# Patient Record
Sex: FEMALE | Race: WHITE | NOT HISPANIC OR LATINO | Employment: UNEMPLOYED | URBAN - METROPOLITAN AREA
[De-identification: names, ages, dates, MRNs, and addresses within clinical notes are randomized per-mention and may not be internally consistent; named-entity substitution may affect disease eponyms.]

---

## 2019-04-30 ENCOUNTER — TELEPHONE (OUTPATIENT)
Dept: OBSTETRICS AND GYNECOLOGY | Facility: CLINIC | Age: 39
End: 2019-04-30

## 2019-04-30 NOTE — TELEPHONE ENCOUNTER
----- Message from Kirti Del Valle sent at 4/30/2019 11:59 AM CDT -----  Contact: pt   Can the clinic reply in [facility]:  yes    Who Called: SOSA CASTRO [[id number]]    Date of Positive Preg Test:  04-    1st day of Last Menstrual Cycle: 03-    List Any Difficulties:  no    What Number to Call Back: [phone]

## 2019-04-30 NOTE — TELEPHONE ENCOUNTER
----- Message from Bryant Christy sent at 4/30/2019  2:32 PM CDT -----  Contact: Self  PT is calling back to schedule her initial ob, confirmation, and ultrasound. Best phone number to call her is [phone].

## 2019-05-01 ENCOUNTER — TELEPHONE (OUTPATIENT)
Dept: OBSTETRICS AND GYNECOLOGY | Facility: CLINIC | Age: 39
End: 2019-05-01

## 2019-05-01 ENCOUNTER — OFFICE VISIT (OUTPATIENT)
Dept: OBSTETRICS AND GYNECOLOGY | Facility: CLINIC | Age: 39
End: 2019-05-01
Payer: MEDICAID

## 2019-05-01 ENCOUNTER — HOSPITAL ENCOUNTER (OUTPATIENT)
Dept: RADIOLOGY | Facility: OTHER | Age: 39
Discharge: HOME | End: 2019-05-01
Attending: STUDENT IN AN ORGANIZED HEALTH CARE EDUCATION/TRAINING PROGRAM
Payer: MEDICAID

## 2019-05-01 VITALS
DIASTOLIC BLOOD PRESSURE: 64 MMHG | BODY MASS INDEX: 22.44 KG/M2 | WEIGHT: 134.69 LBS | HEIGHT: 65 IN | SYSTOLIC BLOOD PRESSURE: 106 MMHG

## 2019-05-01 DIAGNOSIS — R10.9 ABDOMINAL PAIN DURING PREGNANCY IN FIRST TRIMESTER: ICD-10-CM

## 2019-05-01 DIAGNOSIS — E83.110 HEREDITARY HEMOCHROMATOSIS: ICD-10-CM

## 2019-05-01 DIAGNOSIS — O09.511 ELDERLY PRIMIGRAVIDA IN FIRST TRIMESTER: ICD-10-CM

## 2019-05-01 DIAGNOSIS — O26.891 ABDOMINAL PAIN DURING PREGNANCY IN FIRST TRIMESTER: ICD-10-CM

## 2019-05-01 DIAGNOSIS — Z32.01 POSITIVE PREGNANCY TEST: Primary | ICD-10-CM

## 2019-05-01 DIAGNOSIS — Z32.01 POSITIVE PREGNANCY TEST: ICD-10-CM

## 2019-05-01 DIAGNOSIS — N91.2 AMENORRHEA: Primary | ICD-10-CM

## 2019-05-01 PROBLEM — O09.519 ADVANCED MATERNAL AGE, PRIMIGRAVIDA, ANTEPARTUM: Status: ACTIVE | Noted: 2019-05-01

## 2019-05-01 LAB
ABO + RH BLD: NORMAL
ALBUMIN SERPL BCP-MCNC: 3.8 G/DL (ref 3.5–5.2)
ALP SERPL-CCNC: 65 U/L (ref 55–135)
ALT SERPL W/O P-5'-P-CCNC: 21 U/L (ref 10–44)
ANION GAP SERPL CALC-SCNC: 9 MMOL/L (ref 8–16)
AST SERPL-CCNC: 23 U/L (ref 10–40)
B-HCG UR QL: POSITIVE
BILIRUB SERPL-MCNC: 0.6 MG/DL (ref 0.1–1)
BLD GP AB SCN CELLS X3 SERPL QL: NORMAL
BUN SERPL-MCNC: 6 MG/DL (ref 6–20)
CALCIUM SERPL-MCNC: 10.3 MG/DL (ref 8.7–10.5)
CHLORIDE SERPL-SCNC: 100 MMOL/L (ref 95–110)
CO2 SERPL-SCNC: 28 MMOL/L (ref 23–29)
CREAT SERPL-MCNC: 0.6 MG/DL (ref 0.5–1.4)
CTP QC/QA: YES
ERYTHROCYTE [DISTWIDTH] IN BLOOD BY AUTOMATED COUNT: 12.7 % (ref 11.5–14.5)
EST. GFR  (AFRICAN AMERICAN): >60 ML/MIN/1.73 M^2
EST. GFR  (NON AFRICAN AMERICAN): >60 ML/MIN/1.73 M^2
FERRITIN SERPL-MCNC: 41 NG/ML (ref 20–300)
GLUCOSE SERPL-MCNC: 68 MG/DL (ref 70–110)
HCT VFR BLD AUTO: 43.2 % (ref 37–48.5)
HGB BLD-MCNC: 14.6 G/DL (ref 12–16)
IRON SERPL-MCNC: 69 UG/DL (ref 30–160)
MCH RBC QN AUTO: 33.6 PG (ref 27–31)
MCHC RBC AUTO-ENTMCNC: 33.8 G/DL (ref 32–36)
MCV RBC AUTO: 100 FL (ref 82–98)
PLATELET # BLD AUTO: 324 K/UL (ref 150–350)
PMV BLD AUTO: 10.2 FL (ref 9.2–12.9)
POTASSIUM SERPL-SCNC: 3.9 MMOL/L (ref 3.5–5.1)
PROT SERPL-MCNC: 7.4 G/DL (ref 6–8.4)
RBC # BLD AUTO: 4.34 M/UL (ref 4–5.4)
SATURATED IRON: 21 % (ref 20–50)
SODIUM SERPL-SCNC: 137 MMOL/L (ref 136–145)
TOTAL IRON BINDING CAPACITY: 332 UG/DL (ref 250–450)
TRANSFERRIN SERPL-MCNC: 224 MG/DL (ref 200–375)
WBC # BLD AUTO: 7.8 K/UL (ref 3.9–12.7)

## 2019-05-01 PROCEDURE — 87086 URINE CULTURE/COLONY COUNT: CPT

## 2019-05-01 PROCEDURE — 76817 US OB LESS THAN 14 WKS WITH TRANSVAGINAL (XPD): ICD-10-PCS | Mod: 26,,, | Performed by: RADIOLOGY

## 2019-05-01 PROCEDURE — 76817 TRANSVAGINAL US OBSTETRIC: CPT | Mod: 26,,, | Performed by: RADIOLOGY

## 2019-05-01 PROCEDURE — 99203 OFFICE O/P NEW LOW 30 MIN: CPT | Mod: S$PBB,TH,, | Performed by: STUDENT IN AN ORGANIZED HEALTH CARE EDUCATION/TRAINING PROGRAM

## 2019-05-01 PROCEDURE — 86850 RBC ANTIBODY SCREEN: CPT

## 2019-05-01 PROCEDURE — 86592 SYPHILIS TEST NON-TREP QUAL: CPT

## 2019-05-01 PROCEDURE — 36415 COLL VENOUS BLD VENIPUNCTURE: CPT | Mod: PO

## 2019-05-01 PROCEDURE — 76801 OB US < 14 WKS SINGLE FETUS: CPT | Mod: TC

## 2019-05-01 PROCEDURE — 82728 ASSAY OF FERRITIN: CPT

## 2019-05-01 PROCEDURE — 99203 PR OFFICE/OUTPT VISIT, NEW, LEVL III, 30-44 MIN: ICD-10-PCS | Mod: S$PBB,TH,, | Performed by: STUDENT IN AN ORGANIZED HEALTH CARE EDUCATION/TRAINING PROGRAM

## 2019-05-01 PROCEDURE — 81025 URINE PREGNANCY TEST: CPT | Mod: PBBFAC,PO | Performed by: STUDENT IN AN ORGANIZED HEALTH CARE EDUCATION/TRAINING PROGRAM

## 2019-05-01 PROCEDURE — 76801 US OB LESS THAN 14 WKS WITH TRANSVAGINAL (XPD): ICD-10-PCS | Mod: 26,,, | Performed by: RADIOLOGY

## 2019-05-01 PROCEDURE — 86787 VARICELLA-ZOSTER ANTIBODY: CPT

## 2019-05-01 PROCEDURE — 87340 HEPATITIS B SURFACE AG IA: CPT

## 2019-05-01 PROCEDURE — 86703 HIV-1/HIV-2 1 RESULT ANTBDY: CPT

## 2019-05-01 PROCEDURE — 76801 OB US < 14 WKS SINGLE FETUS: CPT | Mod: 26,,, | Performed by: RADIOLOGY

## 2019-05-01 PROCEDURE — 85027 COMPLETE CBC AUTOMATED: CPT

## 2019-05-01 PROCEDURE — 99999 PR PBB SHADOW E&M-EST. PATIENT-LVL III: CPT | Mod: PBBFAC,,, | Performed by: STUDENT IN AN ORGANIZED HEALTH CARE EDUCATION/TRAINING PROGRAM

## 2019-05-01 PROCEDURE — 86762 RUBELLA ANTIBODY: CPT

## 2019-05-01 PROCEDURE — 99999 PR PBB SHADOW E&M-EST. PATIENT-LVL III: ICD-10-PCS | Mod: PBBFAC,,, | Performed by: STUDENT IN AN ORGANIZED HEALTH CARE EDUCATION/TRAINING PROGRAM

## 2019-05-01 PROCEDURE — 99213 OFFICE O/P EST LOW 20 MIN: CPT | Mod: PBBFAC,TH,PO,25 | Performed by: STUDENT IN AN ORGANIZED HEALTH CARE EDUCATION/TRAINING PROGRAM

## 2019-05-01 PROCEDURE — 88175 CYTOPATH C/V AUTO FLUID REDO: CPT

## 2019-05-01 PROCEDURE — 83540 ASSAY OF IRON: CPT

## 2019-05-01 PROCEDURE — 87624 HPV HI-RISK TYP POOLED RSLT: CPT

## 2019-05-01 PROCEDURE — 87491 CHLMYD TRACH DNA AMP PROBE: CPT

## 2019-05-01 PROCEDURE — 80053 COMPREHEN METABOLIC PANEL: CPT

## 2019-05-01 NOTE — TELEPHONE ENCOUNTER
----- Message from Isabel Espino sent at 5/1/2019  8:11 AM CDT -----  Contact: Self            Name of Who is Calling: SOSA CASTRO [[id number]]      What is the request in detail: Pt is scheduled for a pregnancy confirmation tomorrow and states she is having pelvic discomfort. Pt wants to know if she can do her pregnancy confirmation today. Pt stated she  is 8 weeks. Please contact to further discuss and advise.        Can the clinic reply by [facility]: N      What Number to [facility] Back [facility]: [phone]

## 2019-05-01 NOTE — PROGRESS NOTES
Subjective:       Patient ID: Danielle Garcia is a 38 y.o. female.    Chief Complaint:  Amenorrhea      History of Present Illness  Danielle Garcia is a 38 y.o. F at Unknown presents complaining of missed period. Patient LMP was 3/6/19. She reports taking home pregnancy test two weeks ago which returned as positive. Patient recently moved to the area from [place] where she received prior care. She is overall feeling well. Reports she has been experiencing some mild nausea and pelvic discomfort but is otherwise without complaint. She denies history of prior pregnancy. Of note, patient has past medical history of hereditary hemochromatosis as well as heterozygosity for alpha-1 antitrypsin deficiency. She denies requiring active management for the disorder. Reports she was seen by provider in [place] who recommended frequent blood donation for management of iron levels. Surgical history includes right knee surgery and heart procedure for VSD as a child.         GYN & OB History  Patient's last menstrual period was 2019.   Date of Last Pap: No result found    OB History    Para Term  AB Living   2             SAB TAB Ectopic Multiple Live Births                  # Outcome Date GA Lbr Justino/2nd Weight Sex Delivery Anes PTL Lv   2 Current            1                 Review of Systems  Review of Systems   Constitutional: Negative for chills and fever.   Eyes: Negative for visual disturbance.   Respiratory: Negative for shortness of breath.    Cardiovascular: Negative for chest pain and palpitations.   Gastrointestinal: Negative for abdominal pain, constipation, diarrhea, nausea and vomiting.   Genitourinary: Positive for pelvic pain. Negative for vaginal bleeding, vaginal discharge and vaginal pain.   Musculoskeletal: Negative for back pain.   Integumentary:  Negative for rash.   Neurological: Negative for seizures, syncope and headaches.   Hematological: Does not bruise/bleed  easily.   Psychiatric/Behavioral: Negative for depression. The patient is not nervous/anxious.            Objective:    Physical Exam:   Constitutional: She is oriented to person, place, and time. She appears well-developed and well-nourished. No distress.    HENT:   Head: Normocephalic and atraumatic.   Nose: No epistaxis.    Eyes: Conjunctivae and EOM are normal.    Neck: Normal range of motion. Neck supple. No thyromegaly present.    Cardiovascular: Normal rate and regular rhythm.     Pulmonary/Chest: Effort normal. No respiratory distress.        Abdominal: Soft. She exhibits no distension. There is no tenderness.     Genitourinary: Vagina normal. Rectal exam shows guaiac negative stool. Guaiac negative stool. No vaginal discharge found.   Genitourinary Comments: Pap smear and gc/ct collected           Musculoskeletal: Normal range of motion and moves all extremeties. She exhibits no edema or tenderness.       Neurological: She is alert and oriented to person, place, and time.    Skin: Skin is warm and dry. No rash noted.    Psychiatric: She has a normal mood and affect. Her behavior is normal.          Assessment:        1. Missed period    2. Hereditary hemochromatosis    3. Elderly primigravida in first trimester               Plan:      Danielle was seen today for amenorrhea.    Diagnoses and all orders for this visit:    Missed period  - routine prenatal labs ordered  - radiology ultrasound ordered due to pelvic pain/cramping   - recommend OTC prenatal vitamins without iron (due to hemochromatosis)  - recommend OTC unisom/B6 for nausea; reevaluate at next visit     Hereditary hemochromatosis  - iron studies ordered   - patient will likely need MFM consult during pregnancy   - she is undecided regarding genetic testing     Elderly primigravida in first trimester  - she is undecided regarding genetic screening   - will revisit at next appointment       Orders Placed This Encounter   Procedures    C.  trachomatis/N. gonorrhoeae by AMP DNA    Urine culture    HPV High Risk Genotypes, PCR    US OB Less Than 14 Wks First Gestation    CBC Without Differential    Hepatitis B surface antigen    HIV 1/2 Ag/Ab (4th Gen)    RPR    Rubella antibody, IgG    Iron and TIBC    Ferritin    Comprehensive metabolic panel    Varicella zoster antibody, IgG    Type & Screen, Labor & Delivery       Follow up in about 1 month (around 5/29/2019) for OB follow up.    Sharla Odonnell MD  OBGYN, PGY-1

## 2019-05-01 NOTE — TELEPHONE ENCOUNTER
----- Message from Helen Puri MA sent at 5/1/2019  3:10 PM CDT -----  Contact: Pt   NEW OB   ----- Message -----  From: Debra Sharma  Sent: 4/29/2019  12:18 PM  To: Livia GIFFORD Staff    Can the clinic reply in [facility]: No     Who Called: SOSA CASTRO [[id number]]    Date of Positive Preg Test: 03/06/2019    1st day of Last Menstrual Cycle: 04/25/2019    List Any Difficulties: None     What Number to Call Back: [phone]

## 2019-05-02 LAB
HBV SURFACE AG SERPL QL IA: NEGATIVE
HIV 1+2 AB+HIV1 P24 AG SERPL QL IA: NEGATIVE
RPR SER QL: NORMAL
RUBV IGG SER-ACNC: 46.9 IU/ML
RUBV IGG SER-IMP: REACTIVE

## 2019-05-03 LAB
BACTERIA UR CULT: NORMAL
C TRACH DNA SPEC QL NAA+PROBE: NOT DETECTED
N GONORRHOEA DNA SPEC QL NAA+PROBE: NOT DETECTED
VARICELLA INTERPRETATION: POSITIVE
VARICELLA ZOSTER IGG: 3.08 ISR (ref 0–0.9)

## 2019-05-06 LAB
HPV HR 12 DNA CVX QL NAA+PROBE: POSITIVE
HPV16 AG SPEC QL: NEGATIVE
HPV18 DNA SPEC QL NAA+PROBE: NEGATIVE

## 2019-05-10 ENCOUNTER — PATIENT MESSAGE (OUTPATIENT)
Dept: OBSTETRICS AND GYNECOLOGY | Facility: CLINIC | Age: 39
End: 2019-05-10

## 2019-05-10 ENCOUNTER — TELEPHONE (OUTPATIENT)
Dept: OBSTETRICS AND GYNECOLOGY | Facility: CLINIC | Age: 39
End: 2019-05-10

## 2019-05-10 PROBLEM — R87.810 CERVICAL HIGH RISK HUMAN PAPILLOMAVIRUS (HPV) DNA TEST POSITIVE: Status: ACTIVE | Noted: 2019-05-10

## 2019-05-10 NOTE — TELEPHONE ENCOUNTER
Attempt to contact patient regarding Pap results. Patient did not answer. Voicemail left.     Patient had NILM Pap with positive HPV. Patient will need repeat pap in one year.     .Sharla Odonnell MD  OBGYN, PGY-1

## 2019-05-22 ENCOUNTER — TELEPHONE (OUTPATIENT)
Dept: MATERNAL FETAL MEDICINE | Facility: CLINIC | Age: 39
End: 2019-05-22

## 2019-05-22 ENCOUNTER — ROUTINE PRENATAL (OUTPATIENT)
Dept: OBSTETRICS AND GYNECOLOGY | Facility: CLINIC | Age: 39
End: 2019-05-22
Payer: MEDICAID

## 2019-05-22 VITALS
BODY MASS INDEX: 23.37 KG/M2 | WEIGHT: 140.44 LBS | DIASTOLIC BLOOD PRESSURE: 62 MMHG | SYSTOLIC BLOOD PRESSURE: 108 MMHG

## 2019-05-22 DIAGNOSIS — Z3A.11 11 WEEKS GESTATION OF PREGNANCY: Primary | ICD-10-CM

## 2019-05-22 PROCEDURE — 99999 PR PBB SHADOW E&M-EST. PATIENT-LVL II: CPT | Mod: PBBFAC,,, | Performed by: ADVANCED PRACTICE MIDWIFE

## 2019-05-22 PROCEDURE — 99213 PR OFFICE/OUTPT VISIT, EST, LEVL III, 20-29 MIN: ICD-10-PCS | Mod: TH,S$PBB,, | Performed by: ADVANCED PRACTICE MIDWIFE

## 2019-05-22 PROCEDURE — 99213 OFFICE O/P EST LOW 20 MIN: CPT | Mod: TH,S$PBB,, | Performed by: ADVANCED PRACTICE MIDWIFE

## 2019-05-22 PROCEDURE — 99999 PR PBB SHADOW E&M-EST. PATIENT-LVL II: ICD-10-PCS | Mod: PBBFAC,,, | Performed by: ADVANCED PRACTICE MIDWIFE

## 2019-05-22 PROCEDURE — 99212 OFFICE O/P EST SF 10 MIN: CPT | Mod: PBBFAC,PO | Performed by: ADVANCED PRACTICE MIDWIFE

## 2019-05-22 NOTE — TELEPHONE ENCOUNTER
Message left for patient to call [facility] clinic at [phone] to schedule NT.

## 2019-05-22 NOTE — PROGRESS NOTES
Chief Complaint   Patient presents with    Initial Prenatal Visit       38 y.o., at 11w0d by Estimated Date of Delivery: 19    Complaints today: None    ROS  OBSTETRICS:   Contractions denies   Bleeding denies   Loss of fluid denies   Fetal mvmnt denies  GASTRO:   Nausea Slight- using Unisom and B6   Vomiting Slight      OB History    Para Term  AB Living   1             SAB TAB Ectopic Multiple Live Births                  # Outcome Date GA Lbr Justino/2nd Weight Sex Delivery Anes PTL Lv   1 Current                Dating reviewed  Allergies and problem list reviewed and updated  Medical and surgical history reviewed  Prenatal labs reviewed and updated    PHYSICAL EXAM  /62   Wt 63.7 kg (140 lb 6.9 oz)   LMP 2019   BMI 23.37 kg/m²     GENERAL: No acute distress  HEENT: Normocephalic  NEURO: Alert and oriented x3  PSYCH: Normal mood and affect  PULMONARY: Non-labored respiration; no tachypnea  ABD: Soft, gravid, nontender; no hernia or hepatosplenomegaly    ASSESSMENT AND PLAN    Pregnancy Problems (from 19 to present)     No problems associated with this episode.            Discussed Pap result and low risk HPV with recommendation for repeat Pap in 1 year.  Pt desires NT Seq screen- order placed and MFM notified of EGA so as to schedule within 2 weeks  Follow-up: 4 weeks

## 2019-06-04 ENCOUNTER — LAB VISIT (OUTPATIENT)
Dept: LAB | Facility: OTHER | Age: 39
End: 2019-06-04
Attending: OBSTETRICS & GYNECOLOGY
Payer: MEDICAID

## 2019-06-04 ENCOUNTER — PROCEDURE VISIT (OUTPATIENT)
Dept: MATERNAL FETAL MEDICINE | Facility: CLINIC | Age: 39
End: 2019-06-04
Payer: MEDICAID

## 2019-06-04 DIAGNOSIS — Z36.82 ENCOUNTER FOR NUCHAL TRANSLUCENCY TESTING: ICD-10-CM

## 2019-06-04 DIAGNOSIS — Z3A.11 11 WEEKS GESTATION OF PREGNANCY: ICD-10-CM

## 2019-06-04 DIAGNOSIS — Z36.89 ENCOUNTER FOR FETAL ANATOMIC SURVEY: Primary | ICD-10-CM

## 2019-06-04 PROCEDURE — 36415 COLL VENOUS BLD VENIPUNCTURE: CPT

## 2019-06-04 PROCEDURE — 76813 PR US, OB NUCHAL, TRANSABDOM/TRANSVAG, FIRST GESTATION: ICD-10-PCS | Mod: 26,S$PBB,, | Performed by: OBSTETRICS & GYNECOLOGY

## 2019-06-04 PROCEDURE — 76813 OB US NUCHAL MEAS 1 GEST: CPT | Mod: PBBFAC | Performed by: OBSTETRICS & GYNECOLOGY

## 2019-06-04 PROCEDURE — 76813 OB US NUCHAL MEAS 1 GEST: CPT | Mod: 26,S$PBB,, | Performed by: OBSTETRICS & GYNECOLOGY

## 2019-06-04 PROCEDURE — 99499 UNLISTED E&M SERVICE: CPT | Mod: S$PBB,,, | Performed by: OBSTETRICS & GYNECOLOGY

## 2019-06-04 PROCEDURE — 99499 NO LOS: ICD-10-PCS | Mod: S$PBB,,, | Performed by: OBSTETRICS & GYNECOLOGY

## 2019-06-04 PROCEDURE — 84163 PAPPA SERUM: CPT

## 2019-06-07 LAB — INTEGRATED SCN PATIENT-IMP NAR: NORMAL

## 2019-06-14 ENCOUNTER — TELEPHONE (OUTPATIENT)
Dept: MATERNAL FETAL MEDICINE | Facility: CLINIC | Age: 39
End: 2019-06-14

## 2019-06-17 ENCOUNTER — ROUTINE PRENATAL (OUTPATIENT)
Dept: OBSTETRICS AND GYNECOLOGY | Facility: CLINIC | Age: 39
End: 2019-06-17
Payer: MEDICAID

## 2019-06-17 ENCOUNTER — TELEPHONE (OUTPATIENT)
Dept: HEMATOLOGY/ONCOLOGY | Facility: CLINIC | Age: 39
End: 2019-06-17

## 2019-06-17 VITALS
DIASTOLIC BLOOD PRESSURE: 60 MMHG | BODY MASS INDEX: 25.57 KG/M2 | WEIGHT: 153.69 LBS | SYSTOLIC BLOOD PRESSURE: 110 MMHG

## 2019-06-17 DIAGNOSIS — O09.519 ADVANCED MATERNAL AGE, PRIMIGRAVIDA, ANTEPARTUM: Primary | ICD-10-CM

## 2019-06-17 DIAGNOSIS — E83.110 HEREDITARY HEMOCHROMATOSIS: ICD-10-CM

## 2019-06-17 PROCEDURE — 99213 OFFICE O/P EST LOW 20 MIN: CPT | Mod: TH,S$PBB,, | Performed by: STUDENT IN AN ORGANIZED HEALTH CARE EDUCATION/TRAINING PROGRAM

## 2019-06-17 PROCEDURE — 99999 PR PBB SHADOW E&M-EST. PATIENT-LVL III: CPT | Mod: PBBFAC,,, | Performed by: STUDENT IN AN ORGANIZED HEALTH CARE EDUCATION/TRAINING PROGRAM

## 2019-06-17 PROCEDURE — 99213 OFFICE O/P EST LOW 20 MIN: CPT | Mod: PBBFAC,TH,PO | Performed by: STUDENT IN AN ORGANIZED HEALTH CARE EDUCATION/TRAINING PROGRAM

## 2019-06-17 PROCEDURE — 99999 PR PBB SHADOW E&M-EST. PATIENT-LVL III: ICD-10-PCS | Mod: PBBFAC,,, | Performed by: STUDENT IN AN ORGANIZED HEALTH CARE EDUCATION/TRAINING PROGRAM

## 2019-06-17 PROCEDURE — 99213 PR OFFICE/OUTPT VISIT, EST, LEVL III, 20-29 MIN: ICD-10-PCS | Mod: TH,S$PBB,, | Performed by: STUDENT IN AN ORGANIZED HEALTH CARE EDUCATION/TRAINING PROGRAM

## 2019-06-17 NOTE — PROGRESS NOTES
Subjective:       Patient ID: Danielle Garcia is a 38 y.o. female.    Chief Complaint:  Routine Prenatal Visit      History of Present Illness  Danielle Garcia is a 38 y.o. F at 14w5d presents for routine ob visit. Patient is overall feeling well today. She reports a new varicose vein that she noticed while traveling. She got an US in [place] which was negative. She is inquiring about her iron levels today and would like to get levels drawn.    This IUP is complicated by AMA and hereditary hemochromatosis.  Patient denies contractions, denies vaginal bleeding, denies LOF.   Fetal Movement: not yet felt.          GYN & OB History  Patient's last menstrual period was 2019.   Date of Last Pap: 2019    OB History    Para Term  AB Living   1             SAB TAB Ectopic Multiple Live Births                  # Outcome Date GA Lbr Justino/2nd Weight Sex Delivery Anes PTL Lv   1 Current                Review of Systems  Review of Systems   Constitutional: Negative for activity change, appetite change, chills and fever.   Eyes: Negative for visual disturbance.   Respiratory: Negative for cough and shortness of breath.    Cardiovascular: Negative for chest pain.   Gastrointestinal: Negative for abdominal pain, constipation, nausea and vomiting.   Genitourinary: Negative for dysuria, frequency, hematuria, pelvic pain, urgency, vaginal bleeding, vaginal discharge and vaginal odor.   Musculoskeletal: Negative for myalgias.   Integumentary:  Negative for rash.   Neurological: Negative for headaches.   Psychiatric/Behavioral: Negative for depression and sleep disturbance. The patient is not nervous/anxious.            Objective:    Physical Exam:   Constitutional: She is oriented to person, place, and time. She appears well-developed and well-nourished. No distress.    HENT:   Head: Normocephalic and atraumatic.    Eyes: Conjunctivae and EOM are normal.    Neck: Normal range of motion. Neck  supple. No thyromegaly present.    Cardiovascular: Normal rate and regular rhythm.     Pulmonary/Chest: Effort normal. No respiratory distress.        Abdominal: Soft. She exhibits no distension. There is no tenderness.             Musculoskeletal: Normal range of motion and moves all extremeties. She exhibits no edema or tenderness.       Neurological: She is alert and oriented to person, place, and time.    Skin: Skin is warm and dry. No rash noted.    Psychiatric: She has a normal mood and affect. Her behavior is normal.          Assessment:        1. Advanced maternal age, primigravida, antepartum    2. Hereditary hemochromatosis                Plan:      Danielle was seen today for routine prenatal visit.    Diagnoses and all orders for this visit:    Advanced maternal age, primigravida, antepartum  -     Maternal Integrated Screen Part 2  - otherwise UTD with all prenatal labs   - no complaints today     Hereditary hemochromatosis  - Most recent iron levels normal   - will repeat today and plan for heme onc consult       - Ambulatory consult to Hematology / Oncology          Orders Placed This Encounter   Procedures    Maternal Integrated Screen Part 2    Iron and TIBC    Ferritin    Ambulatory consult to Hematology / Oncology       Return to care in 4 weeks for routine ob visit    Sharla Odonnell MD  OBGYN, PGY-1

## 2019-06-17 NOTE — PROGRESS NOTES
Problems:  1.  AMA  2.  Hemochromatosis; Baby will cause a decrease in FE  Recommend hematology consult.  Needs OB glucose screen.  Doing well, no questions,no problems.  I have reviewed the resident's note, evaluated the patient and agree with the diagnosis and management plan

## 2019-06-24 ENCOUNTER — TELEPHONE (OUTPATIENT)
Dept: OBSTETRICS AND GYNECOLOGY | Facility: CLINIC | Age: 39
End: 2019-06-24

## 2019-06-24 NOTE — TELEPHONE ENCOUNTER
----- Message from Isael Valentín sent at 6/24/2019  9:32 AM CDT -----  Contact: SOSA CASTRO [[id number]]  Name of Who is Calling:SOSA CASTRO [[id number]]    What is the request in detail: Pt requesting lab orders added to chart. She also was not able to complete the ultrasound scheduled on 6.4.19. She's requesting new orders to complete ultrasound. Thanks-    Can the clinic reply by [facility]: Y    What Number to Call Back if not in [facility]: [phone]

## 2019-07-03 ENCOUNTER — TELEPHONE (OUTPATIENT)
Dept: HEMATOLOGY/ONCOLOGY | Facility: CLINIC | Age: 39
End: 2019-07-03

## 2019-07-08 ENCOUNTER — TELEPHONE (OUTPATIENT)
Dept: HEMATOLOGY/ONCOLOGY | Facility: CLINIC | Age: 39
End: 2019-07-08

## 2019-07-09 ENCOUNTER — TELEPHONE (OUTPATIENT)
Dept: HEMATOLOGY/ONCOLOGY | Facility: CLINIC | Age: 39
End: 2019-07-09

## 2019-07-11 ENCOUNTER — TELEPHONE (OUTPATIENT)
Dept: HEMATOLOGY/ONCOLOGY | Facility: CLINIC | Age: 39
End: 2019-07-11

## 2019-07-16 ENCOUNTER — ROUTINE PRENATAL (OUTPATIENT)
Dept: OBSTETRICS AND GYNECOLOGY | Facility: CLINIC | Age: 39
End: 2019-07-16
Payer: COMMERCIAL

## 2019-07-16 VITALS
BODY MASS INDEX: 26.52 KG/M2 | SYSTOLIC BLOOD PRESSURE: 100 MMHG | DIASTOLIC BLOOD PRESSURE: 60 MMHG | WEIGHT: 159.38 LBS

## 2019-07-16 DIAGNOSIS — Z34.02 ENCOUNTER FOR SUPERVISION OF NORMAL FIRST PREGNANCY IN SECOND TRIMESTER: ICD-10-CM

## 2019-07-16 DIAGNOSIS — Z3A.18 18 WEEKS GESTATION OF PREGNANCY: Primary | ICD-10-CM

## 2019-07-16 PROCEDURE — 0502F PR SUBSEQUENT PRENATAL CARE: ICD-10-PCS | Mod: CPTII,S$GLB,, | Performed by: ADVANCED PRACTICE MIDWIFE

## 2019-07-16 PROCEDURE — 99999 PR PBB SHADOW E&M-EST. PATIENT-LVL II: CPT | Mod: PBBFAC,,, | Performed by: ADVANCED PRACTICE MIDWIFE

## 2019-07-16 PROCEDURE — 0502F SUBSEQUENT PRENATAL CARE: CPT | Mod: CPTII,S$GLB,, | Performed by: ADVANCED PRACTICE MIDWIFE

## 2019-07-16 PROCEDURE — 99999 PR PBB SHADOW E&M-EST. PATIENT-LVL II: ICD-10-PCS | Mod: PBBFAC,,, | Performed by: ADVANCED PRACTICE MIDWIFE

## 2019-07-16 NOTE — PROGRESS NOTES
Chief Complaint   Patient presents with    Routine Prenatal Visit    Fatigue       38 y.o., at 18w6d by Estimated Date of Delivery: 19    Complaints today: None    ROS  OBSTETRICS:   Contractions denies   Bleeding denies   Loss of fluid denies   Fetal mvmnt None at this time  GASTRO:   Nausea none   Vomiting none      OB History    Para Term  AB Living   1             SAB TAB Ectopic Multiple Live Births                  # Outcome Date GA Lbr Justino/2nd Weight Sex Delivery Anes PTL Lv   1 Current                Dating reviewed  Allergies and problem list reviewed and updated  Medical and surgical history reviewed  Prenatal labs reviewed and updated    PHYSICAL EXAM  /60   Wt 72.3 kg (159 lb 6.3 oz)   LMP 2019   BMI 26.52 kg/m²     GENERAL: No acute distress  HEENT: Normocephalic  NEURO: Alert and oriented x3  PSYCH: Normal mood and affect  PULMONARY: Non-labored respiration; no tachypnea  ABD: Soft, gravid, nontender; no hernia or hepatosplenomegaly    ASSESSMENT AND PLAN    Pregnancy Problems (from 19 to present)     No problems associated with this episode.          Pt has scheduled appt with Dr. Raya Chopra / Onc sec to hemochromatosis.  Part 2 Seq screen drawn today   labor precautions given  Follow-up: 4 weeks

## 2019-07-19 ENCOUNTER — PROCEDURE VISIT (OUTPATIENT)
Dept: MATERNAL FETAL MEDICINE | Facility: CLINIC | Age: 39
End: 2019-07-19
Attending: OBSTETRICS & GYNECOLOGY
Payer: COMMERCIAL

## 2019-07-19 DIAGNOSIS — Z36.89 ENCOUNTER FOR ULTRASOUND TO CHECK FETAL GROWTH: Primary | ICD-10-CM

## 2019-07-19 DIAGNOSIS — Z36.89 ENCOUNTER FOR FETAL ANATOMIC SURVEY: ICD-10-CM

## 2019-07-19 PROCEDURE — 76811 OB US DETAILED SNGL FETUS: CPT | Mod: S$GLB,,, | Performed by: OBSTETRICS & GYNECOLOGY

## 2019-07-19 PROCEDURE — 76811 PR US, OB FETAL EVAL & EXAM, TRANSABDOM,FIRST GESTATION: ICD-10-PCS | Mod: S$GLB,,, | Performed by: OBSTETRICS & GYNECOLOGY

## 2019-07-19 PROCEDURE — 99499 UNLISTED E&M SERVICE: CPT | Mod: S$GLB,,, | Performed by: OBSTETRICS & GYNECOLOGY

## 2019-07-19 PROCEDURE — 99499 NO LOS: ICD-10-PCS | Mod: S$GLB,,, | Performed by: OBSTETRICS & GYNECOLOGY

## 2019-07-24 ENCOUNTER — INITIAL CONSULT (OUTPATIENT)
Dept: HEMATOLOGY/ONCOLOGY | Facility: CLINIC | Age: 39
End: 2019-07-24
Payer: COMMERCIAL

## 2019-07-24 ENCOUNTER — LAB VISIT (OUTPATIENT)
Dept: LAB | Facility: OTHER | Age: 39
End: 2019-07-24
Attending: INTERNAL MEDICINE
Payer: COMMERCIAL

## 2019-07-24 VITALS
TEMPERATURE: 98 F | HEART RATE: 71 BPM | RESPIRATION RATE: 18 BRPM | BODY MASS INDEX: 27.32 KG/M2 | OXYGEN SATURATION: 98 % | WEIGHT: 164 LBS | SYSTOLIC BLOOD PRESSURE: 102 MMHG | DIASTOLIC BLOOD PRESSURE: 58 MMHG | HEIGHT: 65 IN

## 2019-07-24 DIAGNOSIS — E83.110 HEREDITARY HEMOCHROMATOSIS: ICD-10-CM

## 2019-07-24 DIAGNOSIS — E83.110 HEREDITARY HEMOCHROMATOSIS: Primary | ICD-10-CM

## 2019-07-24 DIAGNOSIS — E88.01 ALPHA-1-ANTITRYPSIN DEFICIENCY: ICD-10-CM

## 2019-07-24 LAB
BASOPHILS # BLD AUTO: 0.02 K/UL (ref 0–0.2)
BASOPHILS NFR BLD: 0.2 % (ref 0–1.9)
DIFFERENTIAL METHOD: ABNORMAL
EOSINOPHIL # BLD AUTO: 0.1 K/UL (ref 0–0.5)
EOSINOPHIL NFR BLD: 1.1 % (ref 0–8)
ERYTHROCYTE [DISTWIDTH] IN BLOOD BY AUTOMATED COUNT: 12.4 % (ref 11.5–14.5)
FERRITIN SERPL-MCNC: 14 NG/ML (ref 20–300)
HCT VFR BLD AUTO: 34.1 % (ref 37–48.5)
HGB BLD-MCNC: 11.8 G/DL (ref 12–16)
IMM GRANULOCYTES # BLD AUTO: 0.04 K/UL (ref 0–0.04)
IMM GRANULOCYTES NFR BLD AUTO: 0.4 % (ref 0–0.5)
IRON SERPL-MCNC: 40 UG/DL (ref 30–160)
LYMPHOCYTES # BLD AUTO: 1.7 K/UL (ref 1–4.8)
LYMPHOCYTES NFR BLD: 17.9 % (ref 18–48)
MCH RBC QN AUTO: 32.7 PG (ref 27–31)
MCHC RBC AUTO-ENTMCNC: 34.6 G/DL (ref 32–36)
MCV RBC AUTO: 95 FL (ref 82–98)
MONOCYTES # BLD AUTO: 0.6 K/UL (ref 0.3–1)
MONOCYTES NFR BLD: 5.7 % (ref 4–15)
NEUTROPHILS # BLD AUTO: 7.3 K/UL (ref 1.8–7.7)
NEUTROPHILS NFR BLD: 74.7 % (ref 38–73)
NRBC BLD-RTO: 0 /100 WBC
PLATELET # BLD AUTO: 228 K/UL (ref 150–350)
PMV BLD AUTO: 10.7 FL (ref 9.2–12.9)
RBC # BLD AUTO: 3.61 M/UL (ref 4–5.4)
SATURATED IRON: 10 % (ref 20–50)
TOTAL IRON BINDING CAPACITY: 383 UG/DL (ref 250–450)
TRANSFERRIN SERPL-MCNC: 259 MG/DL (ref 200–375)
WBC # BLD AUTO: 9.71 K/UL (ref 3.9–12.7)

## 2019-07-24 PROCEDURE — 99204 PR OFFICE/OUTPT VISIT, NEW, LEVL IV, 45-59 MIN: ICD-10-PCS | Mod: S$GLB,,, | Performed by: INTERNAL MEDICINE

## 2019-07-24 PROCEDURE — 36415 COLL VENOUS BLD VENIPUNCTURE: CPT

## 2019-07-24 PROCEDURE — 82728 ASSAY OF FERRITIN: CPT

## 2019-07-24 PROCEDURE — 81256 HFE GENE: CPT

## 2019-07-24 PROCEDURE — 85025 COMPLETE CBC W/AUTO DIFF WBC: CPT

## 2019-07-24 PROCEDURE — 83540 ASSAY OF IRON: CPT

## 2019-07-24 PROCEDURE — 99204 OFFICE O/P NEW MOD 45 MIN: CPT | Mod: S$GLB,,, | Performed by: INTERNAL MEDICINE

## 2019-07-24 PROCEDURE — 99999 PR PBB SHADOW E&M-EST. PATIENT-LVL III: ICD-10-PCS | Mod: PBBFAC,,, | Performed by: INTERNAL MEDICINE

## 2019-07-24 PROCEDURE — 99999 PR PBB SHADOW E&M-EST. PATIENT-LVL III: CPT | Mod: PBBFAC,,, | Performed by: INTERNAL MEDICINE

## 2019-07-24 PROCEDURE — 3008F PR BODY MASS INDEX (BMI) DOCUMENTED: ICD-10-PCS | Mod: CPTII,S$GLB,, | Performed by: INTERNAL MEDICINE

## 2019-07-24 PROCEDURE — 3008F BODY MASS INDEX DOCD: CPT | Mod: CPTII,S$GLB,, | Performed by: INTERNAL MEDICINE

## 2019-07-24 NOTE — PROGRESS NOTES
CC:  hemochromatosis    HPI:  Ms Garcia is a 37 yo woman who presents today for further evaluation of hereditary hemochromatosis. She moved from [place]. She was seeing a doctor there and was found out to have elevated ferritin in the 300-400s range. She underwent evaluation and was diagnosed with hemochromatosis and alpha 1 antitrypsin deficiency. She underwent a liver biopsy and was told it was fine. She was treated with phlebotomy started at once weekly. She last received phlebotomy in 2019. Her ferritin here on 19 was therapeutic at 41. She is not taking iron. Feels well.     ECO    Past Medical History:   Diagnosis Date    Hemochromatosis         Past Surgical History:   Procedure Laterality Date    KNEE SURGERY Right 2018       Family History   Problem Relation Age of Onset    Breast cancer Neg Hx     Colon cancer Neg Hx     Ovarian cancer Neg Hx        Social History     Socioeconomic History    Marital status:      Spouse name: Not on file    Number of children: Not on file    Years of education: Not on file    Highest education level: Not on file   Occupational History    Not on file   Social Needs    Financial resource strain: Not on file    Food insecurity:     Worry: Not on file     Inability: Not on file    Transportation needs:     Medical: Not on file     Non-medical: Not on file   Tobacco Use    Smoking status: Never Smoker    Smokeless tobacco: Never Used   Substance and Sexual Activity    Alcohol use: Not Currently    Drug use: Never    Sexual activity: Yes     Partners: Male   Lifestyle    Physical activity:     Days per week: Not on file     Minutes per session: Not on file    Stress: Not on file   Relationships    Social connections:     Talks on phone: Not on file     Gets together: Not on file     Attends Holiness service: Not on file     Active member of club or organization: Not on file     Attends meetings of clubs or organizations: Not on  file     Relationship status: Not on file   Other Topics Concern    Not on file   Social History Narrative    Not on file       Review of Systems   Constitutional: Negative for appetite change, chills, fatigue, fever and unexpected weight change.   HENT: Negative for mouth sores, nosebleeds, tinnitus, trouble swallowing and voice change.    Eyes: Negative for pain, redness and visual disturbance.   Respiratory: Negative for cough, shortness of breath and wheezing.    Cardiovascular: Negative for chest pain, palpitations and leg swelling.   Gastrointestinal: Negative for abdominal distention, abdominal pain, blood in stool, constipation, diarrhea, nausea and vomiting.   Endocrine: Negative for polydipsia, polyphagia and polyuria.   Genitourinary: Negative for flank pain, frequency and hematuria.   Musculoskeletal: Negative for arthralgias, back pain, gait problem, joint swelling, myalgias, neck pain and neck stiffness.   Skin: Negative for color change, pallor, rash and wound.   Neurological: Negative for tremors, seizures, syncope, speech difficulty, weakness, light-headedness, numbness and headaches.   Hematological: Negative for adenopathy. Does not bruise/bleed easily.   Psychiatric/Behavioral: Negative for confusion, dysphoric mood and self-injury. The patient is not nervous/anxious.    All other systems reviewed and are negative.      Objective:  Physical Exam   Constitutional: She is oriented to person, place, and time. She appears well-developed and well-nourished. No distress.   HENT:   Head: Normocephalic and atraumatic.   Mouth/Throat: Oropharynx is clear and moist. No oropharyngeal exudate.   Eyes: Pupils are equal, round, and reactive to light. Conjunctivae and EOM are normal. No scleral icterus.   Neck: Normal range of motion. Neck supple. No JVD present. No thyromegaly present.   Cardiovascular: Normal rate, regular rhythm, normal heart sounds and intact distal pulses. Exam reveals no gallop and no  friction rub.   No murmur heard.  Pulmonary/Chest: Effort normal and breath sounds normal. No respiratory distress. She has no wheezes. She has no rales.   Abdominal: Soft. Bowel sounds are normal. She exhibits no distension and no mass. There is no hepatosplenomegaly. There is no tenderness. There is no rebound. No hernia.   Musculoskeletal: Normal range of motion. She exhibits no edema, tenderness or deformity.   Lymphadenopathy:     She has no cervical adenopathy.        Right: No supraclavicular adenopathy present.        Left: No supraclavicular adenopathy present.   Neurological: She is alert and oriented to person, place, and time. No cranial nerve deficit. She exhibits normal muscle tone.   Skin: Skin is warm and dry. No rash noted. She is not diaphoretic. No erythema. No pallor.   Psychiatric: She has a normal mood and affect. Her behavior is normal. Judgment and thought content normal.   Vitals reviewed.      Labs:  Ferritin on 5/1/19 was 41. Iron panel normal        Assessment and plan:  1. Hereditary hemochromatosis    2. Alpha-1-antitrypsin deficiency      1.  - Ms Garcia is a 39 yo woman with hereditary hemochromatosis. She is 20 weeks pregnant  - last ferritin at goal 41  - check HFE gene, ferritin, iron/TIBC, CBC today  - monitor CBC, iron/TIBC, ferritin every 3 months  - may not need phlebotomy during pregnancy. Will monitor  - see me in 6 months  - refer to hepatology. She was also diagnosed with alpha-1-antitrypsin deficiency in [place] but was not seeing a hepatologist    2.  - refer to hepatology

## 2019-07-24 NOTE — LETTER
July 24, 2019      Sharla Odonnell MD  [address]  [facility] [address]           [facility] [address] [facility] [address]  [address], [address]  [facility] [address]  Phone: [phone]          Patient: Danielle Garcia   MR Number: [id number]   YOB: 1980   Date of Visit: 7/24/2019       Dear Dr. Sharla Odonnell:    Thank you for referring Danielle Garcia to me for evaluation. Attached you will find relevant portions of my assessment and plan of care.    If you have questions, please do not hesitate to call me. I look forward to following Danielle Garcia along with you.    Sincerely,    Shadi Lyons MD    Enclosure  CC:  No Recipients    If you would like to receive this communication electronically, please contact [email or web address] or [phone] to request more information on [employer] access.    For providers and/or their staff who would like to refer a patient to [facility], please contact us through our one-stop-shop provider referral line, [facility], at [phone].    If you feel you have received this communication in error or would no longer like to receive these types of communications, please e-mail [email or web address]

## 2019-07-24 NOTE — Clinical Note
Refer to hepatology. Check CBC, iron, ferritin, hemochromatosis DNA today. Check CBC, iron, ferritin, every 3 months. RTC on 1/22/19 with iron, ferritin, CBC checked one day prior

## 2019-07-30 DIAGNOSIS — E61.1 IRON DEFICIENCY: Primary | ICD-10-CM

## 2019-07-30 DIAGNOSIS — E83.110 HEREDITARY HEMOCHROMATOSIS: ICD-10-CM

## 2019-07-30 LAB
GENETICIST REVIEW: NORMAL
HFE GENE MUT ANL BLD/T: NORMAL
HFE RELEASED BY: NORMAL
HFE RESULT SUMMARY: NORMAL
REF LAB TEST METHOD: NORMAL
SPECIMEN SOURCE: NORMAL
SPECIMEN,  HEMOCHROMATOSIS: NORMAL

## 2019-07-30 NOTE — PROGRESS NOTES
Called and spoke with Ms Radha. She does have hemochromatosis. But she is also iron deficient. Will try OTC iron with 65 mg of elemental iron a day. Recheck CBC, ferritin, iron/TIBC in 4 weeks to monitor. She understands and agrees with the plan.

## 2019-08-12 ENCOUNTER — ROUTINE PRENATAL (OUTPATIENT)
Dept: OBSTETRICS AND GYNECOLOGY | Facility: CLINIC | Age: 39
End: 2019-08-12
Payer: COMMERCIAL

## 2019-08-12 VITALS — SYSTOLIC BLOOD PRESSURE: 108 MMHG | WEIGHT: 162.25 LBS | DIASTOLIC BLOOD PRESSURE: 62 MMHG | BODY MASS INDEX: 27 KG/M2

## 2019-08-12 DIAGNOSIS — Z3A.22 PREGNANCY WITH 22 COMPLETED WEEKS GESTATION: Primary | ICD-10-CM

## 2019-08-12 DIAGNOSIS — F90.9 ATTENTION DEFICIT HYPERACTIVITY DISORDER (ADHD), UNSPECIFIED ADHD TYPE: ICD-10-CM

## 2019-08-12 PROCEDURE — 0502F PR SUBSEQUENT PRENATAL CARE: ICD-10-PCS | Mod: CPTII,S$GLB,, | Performed by: OBSTETRICS & GYNECOLOGY

## 2019-08-12 PROCEDURE — 99999 PR PBB SHADOW E&M-EST. PATIENT-LVL III: ICD-10-PCS | Mod: PBBFAC,,, | Performed by: OBSTETRICS & GYNECOLOGY

## 2019-08-12 PROCEDURE — 99999 PR PBB SHADOW E&M-EST. PATIENT-LVL III: CPT | Mod: PBBFAC,,, | Performed by: OBSTETRICS & GYNECOLOGY

## 2019-08-12 PROCEDURE — 0502F SUBSEQUENT PRENATAL CARE: CPT | Mod: CPTII,S$GLB,, | Performed by: OBSTETRICS & GYNECOLOGY

## 2019-08-12 RX ORDER — DEXTROAMPHETAMINE SACCHARATE, AMPHETAMINE ASPARTATE MONOHYDRATE, DEXTROAMPHETAMINE SULFATE AND AMPHETAMINE SULFATE 2.5; 2.5; 2.5; 2.5 MG/1; MG/1; MG/1; MG/1
10 CAPSULE, EXTENDED RELEASE ORAL DAILY
Qty: 30 CAPSULE | Refills: 0 | Status: SHIPPED | OUTPATIENT
Start: 2019-08-12 | End: 2019-09-13 | Stop reason: SDUPTHER

## 2019-08-12 RX ORDER — BUPROPION HYDROCHLORIDE 150 MG/1
150 TABLET ORAL EVERY MORNING
Qty: 30 TABLET | Refills: 2 | Status: SHIPPED | OUTPATIENT
Start: 2019-08-12 | End: 2019-11-15 | Stop reason: SDUPTHER

## 2019-08-12 NOTE — PROGRESS NOTES
GOOD FM, NO UC AND NO PV LOSS.  FATIGUE.  DISCUSSED ADD MEDS AND WILL RX MONTHLY.  OCCAS DIZZY - HYDRATION. F/U USG TO COMPLETE ANATOMY 8/22

## 2019-08-22 ENCOUNTER — PROCEDURE VISIT (OUTPATIENT)
Dept: MATERNAL FETAL MEDICINE | Facility: CLINIC | Age: 39
End: 2019-08-22
Payer: COMMERCIAL

## 2019-08-22 DIAGNOSIS — O44.40 LOW-LYING PLACENTA: ICD-10-CM

## 2019-08-22 DIAGNOSIS — Z36.89 ENCOUNTER FOR ULTRASOUND TO ASSESS FETAL GROWTH: Primary | ICD-10-CM

## 2019-08-22 DIAGNOSIS — O09.519 ADVANCED MATERNAL AGE, PRIMIGRAVIDA, ANTEPARTUM: ICD-10-CM

## 2019-08-22 PROCEDURE — 76816 PR  US,PREGNANT UTERUS,F/U,TRANSABD APP: ICD-10-PCS | Mod: S$GLB,,, | Performed by: OBSTETRICS & GYNECOLOGY

## 2019-08-22 PROCEDURE — 76816 OB US FOLLOW-UP PER FETUS: CPT | Mod: S$GLB,,, | Performed by: OBSTETRICS & GYNECOLOGY

## 2019-08-26 ENCOUNTER — DOCUMENTATION ONLY (OUTPATIENT)
Dept: TRANSPLANT | Facility: CLINIC | Age: 39
End: 2019-08-26

## 2019-08-26 NOTE — LETTER
August 26, 2019    Danielle Garcia  [address] [facility] [address]      Dear Danielle Garcia:    Your doctor has referred you to the [facility]. We are sending this letter to remind you to make an appointment with us to complete the referral process.     Please call us at [phone] to schedule an appointment. We look forward to seeing you soon.     If you received a call and have been scheduled, please disregard this letter.       Sincerely,        [facility]   [facility] [place] [address]  [phone]

## 2019-08-26 NOTE — NURSING
Pt records reviewed.   Pt will be referred to Hepatology.  Hereditary hemochromatosis  Initial referral received  from the workque.   Referring Provider/diagnosis  Shadi Lyons MD      Referral letter sent to patient.

## 2019-08-28 ENCOUNTER — LAB VISIT (OUTPATIENT)
Dept: LAB | Facility: OTHER | Age: 39
End: 2019-08-28
Attending: INTERNAL MEDICINE
Payer: COMMERCIAL

## 2019-08-28 DIAGNOSIS — E61.1 IRON DEFICIENCY: ICD-10-CM

## 2019-08-28 DIAGNOSIS — D50.9 IRON DEFICIENCY ANEMIA, UNSPECIFIED IRON DEFICIENCY ANEMIA TYPE: Primary | ICD-10-CM

## 2019-08-28 LAB
BASOPHILS # BLD AUTO: 0.02 K/UL (ref 0–0.2)
BASOPHILS NFR BLD: 0.2 % (ref 0–1.9)
DIFFERENTIAL METHOD: ABNORMAL
EOSINOPHIL # BLD AUTO: 0.2 K/UL (ref 0–0.5)
EOSINOPHIL NFR BLD: 2 % (ref 0–8)
ERYTHROCYTE [DISTWIDTH] IN BLOOD BY AUTOMATED COUNT: 13 % (ref 11.5–14.5)
FERRITIN SERPL-MCNC: 25 NG/ML (ref 20–300)
HCT VFR BLD AUTO: 39.2 % (ref 37–48.5)
HGB BLD-MCNC: 12.9 G/DL (ref 12–16)
IMM GRANULOCYTES # BLD AUTO: 0.03 K/UL (ref 0–0.04)
IMM GRANULOCYTES NFR BLD AUTO: 0.4 % (ref 0–0.5)
IRON SERPL-MCNC: 184 UG/DL (ref 30–160)
LYMPHOCYTES # BLD AUTO: 1.4 K/UL (ref 1–4.8)
LYMPHOCYTES NFR BLD: 17.6 % (ref 18–48)
MCH RBC QN AUTO: 32.3 PG (ref 27–31)
MCHC RBC AUTO-ENTMCNC: 32.9 G/DL (ref 32–36)
MCV RBC AUTO: 98 FL (ref 82–98)
MONOCYTES # BLD AUTO: 0.4 K/UL (ref 0.3–1)
MONOCYTES NFR BLD: 4.8 % (ref 4–15)
NEUTROPHILS # BLD AUTO: 6.1 K/UL (ref 1.8–7.7)
NEUTROPHILS NFR BLD: 75 % (ref 38–73)
NRBC BLD-RTO: 0 /100 WBC
PLATELET # BLD AUTO: 241 K/UL (ref 150–350)
PMV BLD AUTO: 10 FL (ref 9.2–12.9)
RBC # BLD AUTO: 4 M/UL (ref 4–5.4)
SATURATED IRON: 48 % (ref 20–50)
TOTAL IRON BINDING CAPACITY: 386 UG/DL (ref 250–450)
TRANSFERRIN SERPL-MCNC: 261 MG/DL (ref 200–375)
WBC # BLD AUTO: 8.17 K/UL (ref 3.9–12.7)

## 2019-08-28 PROCEDURE — 85025 COMPLETE CBC W/AUTO DIFF WBC: CPT

## 2019-08-28 PROCEDURE — 82728 ASSAY OF FERRITIN: CPT

## 2019-08-28 PROCEDURE — 83540 ASSAY OF IRON: CPT

## 2019-08-28 PROCEDURE — 36415 COLL VENOUS BLD VENIPUNCTURE: CPT

## 2019-08-28 NOTE — PROGRESS NOTES
Spoke with Ms Garcia. Iron labs are good. She is taking 28 mg of iron in addition to her prenatal vitamin, which contains 28 mg of iron. Asked Ms aGrcia to stop additional iron supplement, and to continue with the same prenatal vitamin. Will monitor labs in 2 months. She understands and agrees with the plan.

## 2019-09-04 ENCOUNTER — TELEPHONE (OUTPATIENT)
Dept: OBSTETRICS AND GYNECOLOGY | Facility: CLINIC | Age: 39
End: 2019-09-04

## 2019-09-04 NOTE — TELEPHONE ENCOUNTER
----- Message from Caterina Lopez sent at 9/3/2019 11:57 AM CDT -----  Contact: SOSA CASTRO [[id number]]    Name of Who is Calling: SOSA CASTRO [[id number]]       What is the request in detail: Patient is requesting a call from staff in regards to scheduling an appointment that Dr. Bhakta wanted her to make .....Please contact to further discuss and advise.     Can the clinic reply by [facility]: yes     What Number to Call Back [facility]:  [phone]

## 2019-09-04 NOTE — TELEPHONE ENCOUNTER
Called patient back, no answer message left, a sooner appt for psychiatry was scheduled and appt reminder sent to patient..

## 2019-09-10 ENCOUNTER — ROUTINE PRENATAL (OUTPATIENT)
Dept: OBSTETRICS AND GYNECOLOGY | Facility: CLINIC | Age: 39
End: 2019-09-10
Payer: COMMERCIAL

## 2019-09-10 ENCOUNTER — PATIENT MESSAGE (OUTPATIENT)
Dept: OBSTETRICS AND GYNECOLOGY | Facility: CLINIC | Age: 39
End: 2019-09-10

## 2019-09-10 VITALS — WEIGHT: 164 LBS | DIASTOLIC BLOOD PRESSURE: 76 MMHG | BODY MASS INDEX: 27.29 KG/M2 | SYSTOLIC BLOOD PRESSURE: 118 MMHG

## 2019-09-10 DIAGNOSIS — Z3A.26 26 WEEKS GESTATION OF PREGNANCY: Primary | ICD-10-CM

## 2019-09-10 PROCEDURE — 0502F SUBSEQUENT PRENATAL CARE: CPT | Mod: CPTII,S$GLB,, | Performed by: NURSE PRACTITIONER

## 2019-09-10 PROCEDURE — 99999 PR PBB SHADOW E&M-EST. PATIENT-LVL II: ICD-10-PCS | Mod: PBBFAC,,, | Performed by: NURSE PRACTITIONER

## 2019-09-10 PROCEDURE — 99999 PR PBB SHADOW E&M-EST. PATIENT-LVL II: CPT | Mod: PBBFAC,,, | Performed by: NURSE PRACTITIONER

## 2019-09-10 PROCEDURE — 0502F PR SUBSEQUENT PRENATAL CARE: ICD-10-PCS | Mod: CPTII,S$GLB,, | Performed by: NURSE PRACTITIONER

## 2019-09-10 NOTE — PROGRESS NOTES
Here for routine OB appt at 26w4d, with no complaints.  Denies VB and cramping. +FM. Discussed Tdap vaccine and glucose screen. Pain, bleeding, and PTL precautions given.  F/U scheduled 4 weeks  Psych appt 9/13  GTT next visit

## 2019-09-13 ENCOUNTER — OFFICE VISIT (OUTPATIENT)
Dept: PSYCHIATRY | Facility: CLINIC | Age: 39
End: 2019-09-13
Payer: COMMERCIAL

## 2019-09-13 DIAGNOSIS — F90.9 ATTENTION DEFICIT HYPERACTIVITY DISORDER (ADHD), UNSPECIFIED ADHD TYPE: ICD-10-CM

## 2019-09-13 DIAGNOSIS — F90.0 ATTENTION DEFICIT HYPERACTIVITY DISORDER (ADHD), PREDOMINANTLY INATTENTIVE TYPE: ICD-10-CM

## 2019-09-13 DIAGNOSIS — F33.42 MAJOR DEPRESSION, RECURRENT, FULL REMISSION: Primary | ICD-10-CM

## 2019-09-13 PROCEDURE — 90791 PSYCH DIAGNOSTIC EVALUATION: CPT | Mod: S$GLB,,, | Performed by: SOCIAL WORKER

## 2019-09-13 PROCEDURE — 90791 PR PSYCHIATRIC DIAGNOSTIC EVALUATION: ICD-10-PCS | Mod: S$GLB,,, | Performed by: SOCIAL WORKER

## 2019-09-13 RX ORDER — DEXTROAMPHETAMINE SACCHARATE, AMPHETAMINE ASPARTATE MONOHYDRATE, DEXTROAMPHETAMINE SULFATE AND AMPHETAMINE SULFATE 2.5; 2.5; 2.5; 2.5 MG/1; MG/1; MG/1; MG/1
10 CAPSULE, EXTENDED RELEASE ORAL DAILY
Qty: 30 CAPSULE | Refills: 0 | Status: SHIPPED | OUTPATIENT
Start: 2019-09-13 | End: 2019-09-18 | Stop reason: SDUPTHER

## 2019-09-13 NOTE — PROGRESS NOTES
"Psychiatry Initial Visit (PhD/LCSW)  Diagnostic Interview - CPT 16121    Date: 9/13/2019    Site: [facility]    Referral source:    Dr. Bhkata    Clinical status of patient: Outpatient    Danielle Garcia, a 38 y.o. female, for initial evaluation visit.  Met with patient.    Chief complaint/reason for encounter: depression,  adhd    History of present illness:    She stopped the antidepressant wellbutrin when found out she was pregnant which was in March of this year.  She also stopped her adderall at the time.   she became scattered brain after stopping the medications.  .      By Aug she is placed back on wellbutrin.  She went back on adderall.  Currently on 10 to 20 mg per day.     She feels better since she began the medications.    It did take a few weeks to regain her premorbid state.   Pain: noncontributory    Symptoms:   · Mood:    Before getting back on the psychotropics pt motivation was low.  She was no longer showering daily or working out at all.   She also feels "happier now".    Sleep is unchanged. And was sleeping fine.   Energy is improved.   Appetite is better.   She has no thoughts of suicide and has not for most of her life.  It has never been an option.   She has never attempted suicide.   She has no access to firearms.    No thoughts of hurting anyone else.   Before starting medication she was more irritable and self critical.     ·   · She was average in school reference grades, she did not repeat grades,  Her behavior was poor per talking to much "disruptive",  She finds that without medication she has no filter when talking, and gets derailed from a project, does not listen well.    ·   · Was diagnosed with audio dyslexia.  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   Noticed in this interview at one point she asked me to repeat myself.   She finds that now with the medication she is able to remember better.  She is able to see the yellow lights of school zones,  Had several tickets " prior to medicaiton.      · Anxiety: excessive anxiety/worry and she has not been too anxious since not working.   In high school had bulimia (purged and thoughts about weight).   No purge in a very long time.   Never self mutilation.   No panic attacks but has fainted 3 times while anxious in her lifetime.   No rituals.   No trauma.     · Substance abuse: denied  · Cognitive functioning: denied  · Health behaviors: noncontributory    Psychiatric history: first treated in high school.  and in college.  and on and off.  adderall, wellbutrin  (she has always been on 300 mg-   She took 150 mg   xl   Twice a day.  No hx of hospitalization.    She has used other medications but the ones she recalls and have been used more pervasively are adderall and wellbutrin.     Medical history: noncontributory    Family history of psychiatric illness: maternal grandfather committed suicide.   two sisters adhd.   Mother has adhd.         Social history (marriage, employment, etc.):     27 weeks pregnant.    Moves to [place] Dec of last year from hospitals.  Meets  in Feb 2018.    in June 2019.    Pregnant March.   Marriage is good.    Parents live in [place].   Has two sisters.  Pt middle child.      Substance use:   Alcohol: none   Drugs: none   Tobacco: none   Caffeine: 1/2 cup    Current medications and drug reactions (include OTC, herbal): see medication list     Strengths and liabilities: Strength: Patient accepts guidance/feedback, Strength: Patient is expressive/articulate., Strength: Patient is motivated for change., Strength: Patient is physically healthy., Strength: Patient has positive support network., Strength: Patient has reasonable judgment., Strength: Patient is stable.    Current Evaluation:     Mental Status Exam:  General Appearance:  unremarkable, age appropriate.  Pt is polite and reserved.   She is properly dressed.  Voice is normal speed, volume, articulation and cohesion.  She is logical and  attentive.  She has full range of affect and not dysphoric.  She has no psychomotor retardation.   She is spontaneous, interactive.  She is future directed.  Her narrations are not pessimistic, and she has a sense of humor when appropriate.     Speech: normal tone, normal rate, normal pitch, normal volume      Level of Cooperation: cooperative      Thought Processes: normal and logical   Mood: euthymic      Thought Content: normal, no suicidality, no homicidality, delusions, or paranoia   Affect: congruent and appropriate   Orientation: Oriented x3   Memory: recent >  intact   Attention Span & Concentration: intact   Fund of General Knowledge: intact and appropriate to age and level of education   Abstract Reasoning: interpretation of similarities was abstract, interpretation of proverbs was abstract   Judgment & Insight: good     Language  intact     Diagnostic Impression - Plan:       ICD-10-CM ICD-9-CM   1. Major depression, recurrent, full remission F33.42 296.36   2. Attention deficit hyperactivity disorder (ADHD), predominantly inattentive type F90.0 314.00       Plan:Pt in full remission.   Pt does not feel therapy is necessary at this point and I agree.  do return if symptoms return.   Continue medication management either by OBGYN or psychiatrist.       Return to Clinic: as needed    Length of Service (minutes): 45

## 2019-09-16 ENCOUNTER — TELEPHONE (OUTPATIENT)
Dept: OBSTETRICS AND GYNECOLOGY | Facility: CLINIC | Age: 39
End: 2019-09-16

## 2019-09-16 RX ORDER — DEXTROAMPHETAMINE SACCHARATE, AMPHETAMINE ASPARTATE MONOHYDRATE, DEXTROAMPHETAMINE SULFATE AND AMPHETAMINE SULFATE 2.5; 2.5; 2.5; 2.5 MG/1; MG/1; MG/1; MG/1
10 CAPSULE, EXTENDED RELEASE ORAL DAILY
Qty: 30 CAPSULE | Refills: 0 | Status: CANCELLED | OUTPATIENT
Start: 2019-09-16

## 2019-09-16 NOTE — TELEPHONE ENCOUNTER
----- Message from Yessy Collazo sent at 9/16/2019  9:50 AM CDT -----  Contact: pt  Name of Who is Calling: pt    What is the request in detail: in regards to a Rx. Please contact to further discuss and advise      Can the clinic reply by [facility]: no    What [facility]: [phone]

## 2019-09-18 ENCOUNTER — TELEPHONE (OUTPATIENT)
Dept: OBSTETRICS AND GYNECOLOGY | Facility: CLINIC | Age: 39
End: 2019-09-18

## 2019-09-18 DIAGNOSIS — F90.9 ATTENTION DEFICIT HYPERACTIVITY DISORDER (ADHD), UNSPECIFIED ADHD TYPE: ICD-10-CM

## 2019-09-18 RX ORDER — DEXTROAMPHETAMINE SACCHARATE, AMPHETAMINE ASPARTATE MONOHYDRATE, DEXTROAMPHETAMINE SULFATE AND AMPHETAMINE SULFATE 2.5; 2.5; 2.5; 2.5 MG/1; MG/1; MG/1; MG/1
10 CAPSULE, EXTENDED RELEASE ORAL DAILY
Qty: 30 CAPSULE | Refills: 0 | Status: SHIPPED | OUTPATIENT
Start: 2019-09-18 | End: 2019-10-17 | Stop reason: SDUPTHER

## 2019-09-18 NOTE — TELEPHONE ENCOUNTER
----- Message from Helen Puri MA sent at 9/18/2019 10:02 AM CDT -----  Contact: [facility] CASTRO   Please advise   ----- Message -----  From: Stacie Navarrete  Sent: 9/18/2019   9:35 AM  To: Livia GIFFORD Staff    Please refill the medication(s) listed below. The patient can be reached at this phone number once it is called into the pharmacy. [phone]      Can the clinic reply in [facility]: No      Medication #1 dextroamphetamine-amphetamine (ADDERALL XR) 10 MG 24 hr capsule      Medication #2      Preferred Pharmacy: Printed...she would like it be mailed if possible.

## 2019-09-25 ENCOUNTER — LAB VISIT (OUTPATIENT)
Dept: LAB | Facility: OTHER | Age: 39
End: 2019-09-25
Attending: NURSE PRACTITIONER
Payer: COMMERCIAL

## 2019-09-25 ENCOUNTER — ROUTINE PRENATAL (OUTPATIENT)
Dept: OBSTETRICS AND GYNECOLOGY | Facility: CLINIC | Age: 39
End: 2019-09-25
Payer: COMMERCIAL

## 2019-09-25 ENCOUNTER — TELEPHONE (OUTPATIENT)
Dept: OBSTETRICS AND GYNECOLOGY | Facility: CLINIC | Age: 39
End: 2019-09-25

## 2019-09-25 VITALS
WEIGHT: 166.88 LBS | BODY MASS INDEX: 27.77 KG/M2 | SYSTOLIC BLOOD PRESSURE: 116 MMHG | DIASTOLIC BLOOD PRESSURE: 62 MMHG

## 2019-09-25 DIAGNOSIS — O22.03 VARICOSE VEINS OF LOWER EXTREMITY DURING PREGNANCY IN THIRD TRIMESTER: ICD-10-CM

## 2019-09-25 DIAGNOSIS — Z3A.28 PREGNANCY WITH 28 COMPLETED WEEKS GESTATION: Primary | ICD-10-CM

## 2019-09-25 DIAGNOSIS — Z3A.26 26 WEEKS GESTATION OF PREGNANCY: ICD-10-CM

## 2019-09-25 LAB — GLUCOSE SERPL-MCNC: 110 MG/DL (ref 70–140)

## 2019-09-25 PROCEDURE — 0502F SUBSEQUENT PRENATAL CARE: CPT | Mod: CPTII,S$GLB,, | Performed by: OBSTETRICS & GYNECOLOGY

## 2019-09-25 PROCEDURE — 36415 COLL VENOUS BLD VENIPUNCTURE: CPT

## 2019-09-25 PROCEDURE — 82950 GLUCOSE TEST: CPT

## 2019-09-25 PROCEDURE — 0502F PR SUBSEQUENT PRENATAL CARE: ICD-10-PCS | Mod: CPTII,S$GLB,, | Performed by: OBSTETRICS & GYNECOLOGY

## 2019-09-25 PROCEDURE — 99999 PR PBB SHADOW E&M-EST. PATIENT-LVL II: CPT | Mod: PBBFAC,,, | Performed by: OBSTETRICS & GYNECOLOGY

## 2019-09-25 PROCEDURE — 99999 PR PBB SHADOW E&M-EST. PATIENT-LVL II: ICD-10-PCS | Mod: PBBFAC,,, | Performed by: OBSTETRICS & GYNECOLOGY

## 2019-09-25 RX ORDER — VALACYCLOVIR HYDROCHLORIDE 500 MG/1
TABLET, FILM COATED ORAL
Refills: 0 | COMMUNITY
Start: 2019-09-20

## 2019-09-25 NOTE — TELEPHONE ENCOUNTER
Called patient regarding injections, thought she was scheduled for 10/8 but is scheduled for 10/9      ----- Message from Valdo Contreras sent at 9/25/2019  2:10 PM CDT -----  Helen please call pt regarding her appts [phone]

## 2019-09-25 NOTE — PROGRESS NOTES
GOOD FM, NO UC AND NO PV LOSS.  COMPRESSION STOCKINGS FOR ++ VARICOSE VEIN RIGHT MEDIAL THIGH AND WILL NEED VASCULAR SURG CONSUL PP. LABS TODAY AND REF IMMUNIZATIONS.  F/U 2 WEEKS

## 2019-10-08 ENCOUNTER — CLINICAL SUPPORT (OUTPATIENT)
Dept: OBSTETRICS AND GYNECOLOGY | Facility: CLINIC | Age: 39
End: 2019-10-08
Payer: COMMERCIAL

## 2019-10-08 ENCOUNTER — ROUTINE PRENATAL (OUTPATIENT)
Dept: OBSTETRICS AND GYNECOLOGY | Facility: CLINIC | Age: 39
End: 2019-10-08
Payer: COMMERCIAL

## 2019-10-08 VITALS
DIASTOLIC BLOOD PRESSURE: 72 MMHG | WEIGHT: 168.44 LBS | BODY MASS INDEX: 28.03 KG/M2 | SYSTOLIC BLOOD PRESSURE: 118 MMHG

## 2019-10-08 DIAGNOSIS — Z3A.30 30 WEEKS GESTATION OF PREGNANCY: Primary | ICD-10-CM

## 2019-10-08 DIAGNOSIS — Z23 FLU VACCINE NEED: Primary | ICD-10-CM

## 2019-10-08 PROCEDURE — 99999 PR PBB SHADOW E&M-EST. PATIENT-LVL II: ICD-10-PCS | Mod: PBBFAC,,, | Performed by: NURSE PRACTITIONER

## 2019-10-08 PROCEDURE — 90686 FLU VACCINE (QUAD) GREATER THAN OR EQUAL TO 3YO PRESERVATIVE FREE IM: ICD-10-PCS | Mod: S$GLB,,, | Performed by: NURSE PRACTITIONER

## 2019-10-08 PROCEDURE — 90471 FLU VACCINE (QUAD) GREATER THAN OR EQUAL TO 3YO PRESERVATIVE FREE IM: ICD-10-PCS | Mod: S$GLB,,, | Performed by: NURSE PRACTITIONER

## 2019-10-08 PROCEDURE — 99999 PR PBB SHADOW E&M-EST. PATIENT-LVL II: CPT | Mod: PBBFAC,,, | Performed by: NURSE PRACTITIONER

## 2019-10-08 PROCEDURE — 90715 TDAP VACCINE GREATER THAN OR EQUAL TO 7YO IM: ICD-10-PCS | Mod: S$GLB,,, | Performed by: NURSE PRACTITIONER

## 2019-10-08 PROCEDURE — 90471 IMMUNIZATION ADMIN: CPT | Mod: S$GLB,,, | Performed by: NURSE PRACTITIONER

## 2019-10-08 PROCEDURE — 99999 PR PBB SHADOW E&M-EST. PATIENT-LVL I: ICD-10-PCS | Mod: PBBFAC,,,

## 2019-10-08 PROCEDURE — 90472 IMMUNIZATION ADMIN EACH ADD: CPT | Mod: S$GLB,,, | Performed by: NURSE PRACTITIONER

## 2019-10-08 PROCEDURE — 90472 TDAP VACCINE GREATER THAN OR EQUAL TO 7YO IM: ICD-10-PCS | Mod: S$GLB,,, | Performed by: NURSE PRACTITIONER

## 2019-10-08 PROCEDURE — 99999 PR PBB SHADOW E&M-EST. PATIENT-LVL I: CPT | Mod: PBBFAC,,,

## 2019-10-08 PROCEDURE — 0502F SUBSEQUENT PRENATAL CARE: CPT | Mod: CPTII,S$GLB,, | Performed by: NURSE PRACTITIONER

## 2019-10-08 PROCEDURE — 0502F PR SUBSEQUENT PRENATAL CARE: ICD-10-PCS | Mod: CPTII,S$GLB,, | Performed by: NURSE PRACTITIONER

## 2019-10-08 PROCEDURE — 90686 IIV4 VACC NO PRSV 0.5 ML IM: CPT | Mod: S$GLB,,, | Performed by: NURSE PRACTITIONER

## 2019-10-08 PROCEDURE — 90715 TDAP VACCINE 7 YRS/> IM: CPT | Mod: S$GLB,,, | Performed by: NURSE PRACTITIONER

## 2019-10-08 NOTE — PROGRESS NOTES
Here for TDAP injection. Patient without complaint of pain at this time ,Injection given. Tolerated well. No pain noted after injection.  Advised to wait in lobby 15 minutes and report any adverse reactions.     Site: [facility]    Baby Friendly Handout Given to Patient          Here for Influenza - Quadrivalent - PF (ADULT) vaccine . Vaccine Information sheet given to patient. Patient without complaint of pain prior to or after injection. Immunization tolerated well and patient advised to wait in lobby 15 minutes. Report any adverse reactions.    Site: [facility]

## 2019-10-08 NOTE — PROGRESS NOTES
Here for routine OB appt at 30w4d, with no complaints.  Reports good FM.  Denies LOF, denies VB, denies contractions.  Reviewed warning signs of Labor and Preeclampsia.  Daily FM counts reinforced.  F/U scheduled 2 weeks  Tdap/flu today  Growth scan on 10/21

## 2019-10-17 ENCOUNTER — PATIENT MESSAGE (OUTPATIENT)
Dept: OBSTETRICS AND GYNECOLOGY | Facility: CLINIC | Age: 39
End: 2019-10-17

## 2019-10-17 DIAGNOSIS — F90.9 ATTENTION DEFICIT HYPERACTIVITY DISORDER (ADHD), UNSPECIFIED ADHD TYPE: ICD-10-CM

## 2019-10-21 ENCOUNTER — PATIENT MESSAGE (OUTPATIENT)
Dept: OBSTETRICS AND GYNECOLOGY | Facility: CLINIC | Age: 39
End: 2019-10-21

## 2019-10-21 ENCOUNTER — TELEPHONE (OUTPATIENT)
Dept: HEPATOLOGY | Facility: CLINIC | Age: 39
End: 2019-10-21

## 2019-10-21 ENCOUNTER — PROCEDURE VISIT (OUTPATIENT)
Dept: MATERNAL FETAL MEDICINE | Facility: CLINIC | Age: 39
End: 2019-10-21
Payer: COMMERCIAL

## 2019-10-21 ENCOUNTER — ROUTINE PRENATAL (OUTPATIENT)
Dept: OBSTETRICS AND GYNECOLOGY | Facility: CLINIC | Age: 39
End: 2019-10-21
Payer: COMMERCIAL

## 2019-10-21 ENCOUNTER — OFFICE VISIT (OUTPATIENT)
Dept: HEPATOLOGY | Facility: CLINIC | Age: 39
End: 2019-10-21
Payer: COMMERCIAL

## 2019-10-21 VITALS
BODY MASS INDEX: 28.43 KG/M2 | WEIGHT: 170.88 LBS | SYSTOLIC BLOOD PRESSURE: 120 MMHG | DIASTOLIC BLOOD PRESSURE: 82 MMHG

## 2019-10-21 VITALS
WEIGHT: 170.88 LBS | SYSTOLIC BLOOD PRESSURE: 115 MMHG | TEMPERATURE: 99 F | HEART RATE: 78 BPM | HEIGHT: 65 IN | DIASTOLIC BLOOD PRESSURE: 58 MMHG | BODY MASS INDEX: 28.47 KG/M2

## 2019-10-21 DIAGNOSIS — Z3A.32 32 WEEKS GESTATION OF PREGNANCY: Primary | ICD-10-CM

## 2019-10-21 DIAGNOSIS — O09.519 ADVANCED MATERNAL AGE, PRIMIGRAVIDA, ANTEPARTUM: ICD-10-CM

## 2019-10-21 DIAGNOSIS — Z36.89 ENCOUNTER FOR ULTRASOUND TO ASSESS FETAL GROWTH: ICD-10-CM

## 2019-10-21 DIAGNOSIS — E83.110 HEREDITARY HEMOCHROMATOSIS: ICD-10-CM

## 2019-10-21 PROCEDURE — 3008F BODY MASS INDEX DOCD: CPT | Mod: CPTII,S$GLB,, | Performed by: INTERNAL MEDICINE

## 2019-10-21 PROCEDURE — 76816 PR  US,PREGNANT UTERUS,F/U,TRANSABD APP: ICD-10-PCS | Mod: S$GLB,,, | Performed by: OBSTETRICS & GYNECOLOGY

## 2019-10-21 PROCEDURE — 76816 OB US FOLLOW-UP PER FETUS: CPT | Mod: S$GLB,,, | Performed by: OBSTETRICS & GYNECOLOGY

## 2019-10-21 PROCEDURE — 3008F PR BODY MASS INDEX (BMI) DOCUMENTED: ICD-10-PCS | Mod: CPTII,S$GLB,, | Performed by: INTERNAL MEDICINE

## 2019-10-21 PROCEDURE — 76819 PR US, OB, FETAL BIOPHYSICAL, W/O NST: ICD-10-PCS | Mod: S$GLB,,, | Performed by: OBSTETRICS & GYNECOLOGY

## 2019-10-21 PROCEDURE — 0502F SUBSEQUENT PRENATAL CARE: CPT | Mod: CPTII,S$GLB,, | Performed by: NURSE PRACTITIONER

## 2019-10-21 PROCEDURE — 99999 PR PBB SHADOW E&M-EST. PATIENT-LVL III: CPT | Mod: PBBFAC,,, | Performed by: INTERNAL MEDICINE

## 2019-10-21 PROCEDURE — 99204 PR OFFICE/OUTPT VISIT, NEW, LEVL IV, 45-59 MIN: ICD-10-PCS | Mod: S$GLB,,, | Performed by: INTERNAL MEDICINE

## 2019-10-21 PROCEDURE — 99999 PR PBB SHADOW E&M-EST. PATIENT-LVL III: ICD-10-PCS | Mod: PBBFAC,,, | Performed by: NURSE PRACTITIONER

## 2019-10-21 PROCEDURE — 0502F PR SUBSEQUENT PRENATAL CARE: ICD-10-PCS | Mod: CPTII,S$GLB,, | Performed by: NURSE PRACTITIONER

## 2019-10-21 PROCEDURE — 99204 OFFICE O/P NEW MOD 45 MIN: CPT | Mod: S$GLB,,, | Performed by: INTERNAL MEDICINE

## 2019-10-21 PROCEDURE — 76819 FETAL BIOPHYS PROFIL W/O NST: CPT | Mod: S$GLB,,, | Performed by: OBSTETRICS & GYNECOLOGY

## 2019-10-21 PROCEDURE — 99999 PR PBB SHADOW E&M-EST. PATIENT-LVL III: CPT | Mod: PBBFAC,,, | Performed by: NURSE PRACTITIONER

## 2019-10-21 PROCEDURE — 99999 PR PBB SHADOW E&M-EST. PATIENT-LVL III: ICD-10-PCS | Mod: PBBFAC,,, | Performed by: INTERNAL MEDICINE

## 2019-10-21 RX ORDER — PANTOPRAZOLE SODIUM 20 MG/1
20 TABLET, DELAYED RELEASE ORAL DAILY
Qty: 30 TABLET | Refills: 11 | Status: SHIPPED | OUTPATIENT
Start: 2019-10-21 | End: 2020-01-22

## 2019-10-21 RX ORDER — DEXTROAMPHETAMINE SACCHARATE, AMPHETAMINE ASPARTATE MONOHYDRATE, DEXTROAMPHETAMINE SULFATE AND AMPHETAMINE SULFATE 2.5; 2.5; 2.5; 2.5 MG/1; MG/1; MG/1; MG/1
10 CAPSULE, EXTENDED RELEASE ORAL DAILY
Qty: 30 CAPSULE | Refills: 0 | Status: SHIPPED | OUTPATIENT
Start: 2019-10-21 | End: 2019-11-20 | Stop reason: SDUPTHER

## 2019-10-21 NOTE — Clinical Note
Seeing a pt that likely has hereditary  Hemochromatosis. She was told she has alpha 1 antitrypsin def. Heterozygous alpha-1 antitrypsin phenotype PiM2S with normal enzyme protein levels. Any pulmonary f/u?

## 2019-10-21 NOTE — LETTER
October 22, 2019      Shadi Lyons MD  [address]  [address]  [facility] [address]           [place] - Hepatology  [address] [facility] [address]  Phone: [phone]  Fax: [phone]          Patient: Danielle Garcia   MR Number: [id number]   YOB: 1980   Date of Visit: 10/21/2019       Dear Dr. Shadi Lyons:    Thank you for referring Danielle Garcia to me for evaluation. Attached you will find relevant portions of my assessment and plan of care.    If you have questions, please do not hesitate to call me. I look forward to following Danielle Garcia along with you.    Sincerely,    Ely Back MD    Enclosure  CC:  No Recipients    If you would like to receive this communication electronically, please contact [email or web address] or [phone] to request more information on [facility] access.    For providers and/or their staff who would like to refer a patient to [facility], please contact us through our one-stop-shop provider referral line, [facility], at [phone].    If you feel you have received this communication in error or would no longer like to receive these types of communications, please e-mail [email or web address]

## 2019-10-21 NOTE — PATIENT INSTRUCTIONS
LABOR AND DELIVERY PHONE NUMBER, [phone] (OPEN 24/7, LOCATED ON 6TH FLOOR OF HOSPITAL)  [address] PHONE NUMBER, [phone] (OPEN MON-FRI, 8a-5p)

## 2019-10-21 NOTE — PROGRESS NOTES
"HEPATOLOGY CONSULTATION    Referring Physician: Primary Doctor No  Current Corresponding Physician: Primary Doctor No    Reason for Consultation: Consultation for evaluation of alpha 1 antitrypsin deficiency and  Herediatary Hemochromatosis  The patient is 33 weeks pregnant.    History of Present Illness: Danielle Garcia is a 39 y.o. female who was found out to have an elevated ferritin in the 300-400s range. 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 She was treated with phlebotomy and initially was phlebotomized once weekly. She last underwent phlebotomy in March 2019. It sounds like she only underwent phlebotomy 6 times before she became iron deficient.  Other data:  --2012 ALT and AST 22/22  --MRI abdomen 2012: Findings suggest a mild degree of increased density in the liver, which  can be seen with iron deposition.  --ferritin at the time 272  -- alpha1 antitrypsin level 136 in 2012  -78% iron saturation in 2013 2015 liver biopsy (may have been done after she started phlebotomy)  The perivenular sinusoidal dilatation suggests possible passive congestion. The pattern of the   spotty lobular hepatitis is not specific and may represent drug/toxin-induced liver injury. CLINICAL HISTORY Hepatomegaly. Homozygous C282Y mutation, has received therapeutic phlebotomy. Heterozygous alpha-1 antitrypsin phenotype PiM2S with normal enzyme protein levels. Intermittent borderline elevations of AMA M2. SPECIMEN SOURCE Right lobe liver biopsy, ultrasound guided by Dr. Mcdonough GROSS DESCRIPTION The specimen is received in formalin, is labeled with the patient's information and "right lobeliver biopsy" and consists of three cores of light brown soft tissue, ranging in length from 1.8 to 2 cm, entirely submitted in cassette A1. CG/pl/RLS MICROSCOPIC The liver biopsy includes 35 portal areas, most of which appear normal. Focal " minimal portal inflammation consists of small lymphocytes, with rare plasma cells and eosinophils. The interlobular bile ducts demonstrate normal morphology. Focal cholangiolar proliferation is   minimal. No portal fibrosis is evident (trichrome).    The lobules contain rare acidophil bodies, associated with minimal lymphohistiocytic   inflammation. The hepatocytes contain a mild amount of lipofuscin. Macrovesicular steatosis is   minimal and involves less than 5% of the hepatocytes. Rare hepatocytes demonstrate minimal   stainable iron (0-1+; Perls). No ballooning degeneration or alpha-1 antitrypsin accumulation   (PAS with diastase) is identified.     5/1/19 labs: ALT 21, AST 23, ALKP 65, Tbil 0.6  Ferritin 14 7/24/19 and 25 8/29/19 with iron sat of 10 % and 48% respectively.    The patient denies any symptoms of decompensated cirrhosis, including no ascites or edema, cognitive problems that would suggest hepatic encephalopathy, or GI bleeding from varices.    Chief Complaint   Patient presents with    Herediatary Hemochromatosis       Past Medical History:   Diagnosis Date    Hemochromatosis      Outpatient Encounter Medications as of 10/21/2019   Medication Sig Dispense Refill    buPROPion (WELLBUTRIN XL) 150 MG TB24 tablet Take 1 tablet (150 mg total) by mouth every morning. 30 tablet 2    dextroamphetamine-amphetamine (ADDERALL XR) 10 MG 24 hr capsule Take 1 capsule (10 mg total) by mouth once daily. 30 capsule 0    prenatal vit/iron fum/folic ac (PRENATAL 1+1 ORAL) Take by mouth.      valACYclovir (VALTREX) 500 MG tablet TK 2 TABLETS PO Q 8 H TILL FEVER BLISTER ARE GONE. GF VALTREX  0     No facility-administered encounter medications on file as of 10/21/2019.      Review of patient's allergies indicates:  No Known Allergies  Family History   Problem Relation Age of Onset    Breast cancer Neg Hx     Colon cancer Neg Hx     Ovarian cancer Neg Hx        Social History     Socioeconomic History     Marital status:      Spouse name: Not on file    Number of children: Not on file    Years of education: Not on file    Highest education level: Not on file   Occupational History    Not on file   Social Needs    Financial resource strain: Not on file    Food insecurity:     Worry: Not on file     Inability: Not on file    Transportation needs:     Medical: Not on file     Non-medical: Not on file   Tobacco Use    Smoking status: Never Smoker    Smokeless tobacco: Never Used   Substance and Sexual Activity    Alcohol use: Not Currently    Drug use: Never    Sexual activity: Yes     Partners: Male   Lifestyle    Physical activity:     Days per week: Not on file     Minutes per session: Not on file    Stress: Not on file   Relationships    Social connections:     Talks on phone: Not on file     Gets together: Not on file     Attends Muslim service: Not on file     Active member of club or organization: Not on file     Attends meetings of clubs or organizations: Not on file     Relationship status: Not on file   Other Topics Concern    Not on file   Social History Narrative    Not on file     Review of Systems   Constitutional: Negative.    HENT: Negative.    Eyes: Negative.    Respiratory: Negative.    Cardiovascular: Negative.    Gastrointestinal: Negative.    Genitourinary: Negative.    Musculoskeletal: Negative.    Skin: Negative.    Neurological: Negative.    Psychiatric/Behavioral: Negative.      Vitals:    10/21/19 1034   BP: (!) 115/58   Pulse: 78   Temp: 98.5 °F (36.9 °C)       Physical Exam   Constitutional: She is oriented to person, place, and time. She appears well-developed and well-nourished.   HENT:   Head: Normocephalic.   Eyes: Pupils are equal, round, and reactive to light. No scleral icterus.   Neck: Neck supple. No thyromegaly present.   Cardiovascular: Normal rate, regular rhythm and normal heart sounds.   Pulmonary/Chest: Effort normal and breath sounds normal. She has  no wheezes.   Abdominal: Soft. She exhibits no distension and no mass. There is no tenderness.   Musculoskeletal: Normal range of motion. She exhibits no edema.   Neurological: She is oriented to person, place, and time.   Skin: Skin is warm and dry. No rash noted.   Psychiatric: She has a normal mood and affect.   Vitals reviewed.      Lab Results   Component Value Date    GLU 68 (L) 05/01/2019    BUN 6 05/01/2019    CREATININE 0.6 05/01/2019    CALCIUM 10.3 05/01/2019     05/01/2019    K 3.9 05/01/2019     05/01/2019    PROT 7.4 05/01/2019    CO2 28 05/01/2019    ANIONGAP 9 05/01/2019    WBC 8.17 08/28/2019    RBC 4.00 08/28/2019    HGB 12.9 08/28/2019    HCT 39.2 08/28/2019    MCV 98 08/28/2019    MCH 32.3 (H) 08/28/2019    MCHC 32.9 08/28/2019         Assessment and Plan:    Danielle Garcia is a 39 y.o. female withHerediatary Hemochromatosis  She definitely has the genetic defect for hereditary hemochromatosis. In 2012 her liver enzymes were normal at a time when there was only a modestly elevated ferritin, but MRI did suggest some iron overload. Subsequent liver biopsy did not show significant iron. However, the biopsy may have been done after she initiated phlebotomy which would explain the lack of iron. Of note, she was not phlebotomized many times before she became anemic which would suggest she was not very iron overloaded.    She likely has hemochromatosis. I would like to review her old records and liver biopsy to confirm the disease state. It is important to note that the penetrance of the gene abnormality is very low. At present she remains iron deficient and does not require phlebotomy. I have suggested that she check iron studies every 3 months. Target ferritin is <50.     Her alpha 1 antitrypsin level is normal. She has no alpha 1 antitrypsin def affecting her liver. I will confirm she does not need pulm f/u. Her liver biopsy did not show alpha 1 antitrypsin def either. I will  repeat her level to confirm it is still normal.      Outside Records Request: [place] records including liver biopsy

## 2019-10-21 NOTE — PROGRESS NOTES
"Here for routine OB appt at 32w3d, with c/o acid reflux. Feels like all foods cause it, has not tried any OTC meds yet. Occ "throws up a little" in her throat. Reports good FM.  Denies LOF, denies VB, + BHC. Just came from [facility] for a growth scan, results pending in [facility]. S/p flu/tdap. Plans on getting an epidural, [facility] for peds. Gave L&D phone number, reviewed frequency of upcoming appointments.  Reviewed warning signs of Labor and Preeclampsia.  Daily FM counts reinforced.  F/U scheduled 2 weeks  Rx Protonix    "

## 2019-10-23 ENCOUNTER — TELEPHONE (OUTPATIENT)
Dept: HEPATOLOGY | Facility: CLINIC | Age: 39
End: 2019-10-23

## 2019-10-23 ENCOUNTER — PATIENT MESSAGE (OUTPATIENT)
Dept: OBSTETRICS AND GYNECOLOGY | Facility: CLINIC | Age: 39
End: 2019-10-23

## 2019-10-23 NOTE — TELEPHONE ENCOUNTER
----- Message from Ely Back MD sent at 10/22/2019  9:40 PM CDT -----  Did we get her records? And liver biopsy?

## 2019-10-29 ENCOUNTER — LAB VISIT (OUTPATIENT)
Dept: LAB | Facility: OTHER | Age: 39
End: 2019-10-29
Attending: INTERNAL MEDICINE
Payer: COMMERCIAL

## 2019-10-29 DIAGNOSIS — D50.9 IRON DEFICIENCY ANEMIA, UNSPECIFIED IRON DEFICIENCY ANEMIA TYPE: ICD-10-CM

## 2019-10-29 LAB
BASOPHILS # BLD AUTO: 0.02 K/UL (ref 0–0.2)
BASOPHILS NFR BLD: 0.3 % (ref 0–1.9)
DIFFERENTIAL METHOD: ABNORMAL
EOSINOPHIL # BLD AUTO: 0.1 K/UL (ref 0–0.5)
EOSINOPHIL NFR BLD: 0.9 % (ref 0–8)
ERYTHROCYTE [DISTWIDTH] IN BLOOD BY AUTOMATED COUNT: 13.5 % (ref 11.5–14.5)
FERRITIN SERPL-MCNC: 16 NG/ML (ref 20–300)
HCT VFR BLD AUTO: 38 % (ref 37–48.5)
HGB BLD-MCNC: 13.3 G/DL (ref 12–16)
IMM GRANULOCYTES # BLD AUTO: 0.04 K/UL (ref 0–0.04)
IMM GRANULOCYTES NFR BLD AUTO: 0.6 % (ref 0–0.5)
IRON SERPL-MCNC: 138 UG/DL (ref 30–160)
LYMPHOCYTES # BLD AUTO: 1 K/UL (ref 1–4.8)
LYMPHOCYTES NFR BLD: 14.4 % (ref 18–48)
MCH RBC QN AUTO: 33.1 PG (ref 27–31)
MCHC RBC AUTO-ENTMCNC: 35 G/DL (ref 32–36)
MCV RBC AUTO: 95 FL (ref 82–98)
MONOCYTES # BLD AUTO: 0.3 K/UL (ref 0.3–1)
MONOCYTES NFR BLD: 4.9 % (ref 4–15)
NEUTROPHILS # BLD AUTO: 5.5 K/UL (ref 1.8–7.7)
NEUTROPHILS NFR BLD: 78.9 % (ref 38–73)
NRBC BLD-RTO: 0 /100 WBC
PLATELET # BLD AUTO: 191 K/UL (ref 150–350)
PMV BLD AUTO: 11.1 FL (ref 9.2–12.9)
RBC # BLD AUTO: 4.02 M/UL (ref 4–5.4)
SATURATED IRON: 29 % (ref 20–50)
TOTAL IRON BINDING CAPACITY: 468 UG/DL (ref 250–450)
TRANSFERRIN SERPL-MCNC: 316 MG/DL (ref 200–375)
WBC # BLD AUTO: 6.99 K/UL (ref 3.9–12.7)

## 2019-10-29 PROCEDURE — 83540 ASSAY OF IRON: CPT

## 2019-10-29 PROCEDURE — 82728 ASSAY OF FERRITIN: CPT

## 2019-10-29 PROCEDURE — 85025 COMPLETE CBC W/AUTO DIFF WBC: CPT | Mod: 91

## 2019-10-29 PROCEDURE — 36415 COLL VENOUS BLD VENIPUNCTURE: CPT

## 2019-10-30 ENCOUNTER — PATIENT MESSAGE (OUTPATIENT)
Dept: HEPATOLOGY | Facility: CLINIC | Age: 39
End: 2019-10-30

## 2019-10-30 ENCOUNTER — TELEPHONE (OUTPATIENT)
Dept: HEMATOLOGY/ONCOLOGY | Facility: CLINIC | Age: 39
End: 2019-10-30

## 2019-10-30 DIAGNOSIS — E83.110 HEREDITARY HEMOCHROMATOSIS: Primary | ICD-10-CM

## 2019-10-30 DIAGNOSIS — D50.9 IRON DEFICIENCY ANEMIA, UNSPECIFIED IRON DEFICIENCY ANEMIA TYPE: ICD-10-CM

## 2019-11-06 ENCOUNTER — ROUTINE PRENATAL (OUTPATIENT)
Dept: OBSTETRICS AND GYNECOLOGY | Facility: CLINIC | Age: 39
End: 2019-11-06
Payer: COMMERCIAL

## 2019-11-06 VITALS
DIASTOLIC BLOOD PRESSURE: 74 MMHG | WEIGHT: 172.81 LBS | BODY MASS INDEX: 28.76 KG/M2 | SYSTOLIC BLOOD PRESSURE: 116 MMHG

## 2019-11-06 DIAGNOSIS — O09.513 PRIMIGRAVIDA OF ADVANCED MATERNAL AGE IN THIRD TRIMESTER: Primary | ICD-10-CM

## 2019-11-06 DIAGNOSIS — Q28.9 CONGENITAL ANOMALY OF CARDIOVASCULAR SYSTEM: ICD-10-CM

## 2019-11-06 PROCEDURE — 0502F PR SUBSEQUENT PRENATAL CARE: ICD-10-PCS | Mod: CPTII,S$GLB,, | Performed by: OBSTETRICS & GYNECOLOGY

## 2019-11-06 PROCEDURE — 0502F SUBSEQUENT PRENATAL CARE: CPT | Mod: CPTII,S$GLB,, | Performed by: OBSTETRICS & GYNECOLOGY

## 2019-11-06 PROCEDURE — 99999 PR PBB SHADOW E&M-EST. PATIENT-LVL III: ICD-10-PCS | Mod: PBBFAC,,, | Performed by: OBSTETRICS & GYNECOLOGY

## 2019-11-06 PROCEDURE — 99999 PR PBB SHADOW E&M-EST. PATIENT-LVL III: CPT | Mod: PBBFAC,,, | Performed by: OBSTETRICS & GYNECOLOGY

## 2019-11-06 NOTE — PROGRESS NOTES
GOOD FM, NO UC AND NO PV LOSS.  WEEKLY PNT, IRON STUDIES.  DISCUSSED HISTORY OF VSD REPAIR 3YO WITH HER FOR THE FIRST TIME THIS PREGNANCY, AND WOULD REF FOR FETAL ECHO - WILL ORDER AND CONFIRM WITH MFM.  F/U 2 WEEKS

## 2019-11-07 ENCOUNTER — PATIENT MESSAGE (OUTPATIENT)
Dept: OBSTETRICS AND GYNECOLOGY | Facility: CLINIC | Age: 39
End: 2019-11-07

## 2019-11-11 ENCOUNTER — PATIENT MESSAGE (OUTPATIENT)
Dept: OBSTETRICS AND GYNECOLOGY | Facility: CLINIC | Age: 39
End: 2019-11-11

## 2019-11-17 RX ORDER — BUPROPION HYDROCHLORIDE 150 MG/1
TABLET ORAL
Qty: 30 TABLET | Refills: 0 | Status: SHIPPED | OUTPATIENT
Start: 2019-11-17 | End: 2019-12-26 | Stop reason: SDUPTHER

## 2019-11-20 ENCOUNTER — ROUTINE PRENATAL (OUTPATIENT)
Dept: OBSTETRICS AND GYNECOLOGY | Facility: CLINIC | Age: 39
End: 2019-11-20
Payer: COMMERCIAL

## 2019-11-20 ENCOUNTER — HOSPITAL ENCOUNTER (OUTPATIENT)
Dept: PERINATAL CARE | Facility: OTHER | Age: 39
Discharge: HOME | End: 2019-11-20
Attending: OBSTETRICS & GYNECOLOGY
Payer: COMMERCIAL

## 2019-11-20 VITALS
DIASTOLIC BLOOD PRESSURE: 72 MMHG | SYSTOLIC BLOOD PRESSURE: 108 MMHG | WEIGHT: 178.56 LBS | BODY MASS INDEX: 29.72 KG/M2

## 2019-11-20 DIAGNOSIS — Z3A.36 PREGNANCY WITH 36 COMPLETED WEEKS GESTATION: Primary | ICD-10-CM

## 2019-11-20 DIAGNOSIS — F90.9 ATTENTION DEFICIT HYPERACTIVITY DISORDER (ADHD), UNSPECIFIED ADHD TYPE: ICD-10-CM

## 2019-11-20 DIAGNOSIS — E83.119 HEMOCHROMATOSIS, UNSPECIFIED HEMOCHROMATOSIS TYPE: ICD-10-CM

## 2019-11-20 DIAGNOSIS — O09.513 PRIMIGRAVIDA OF ADVANCED MATERNAL AGE IN THIRD TRIMESTER: ICD-10-CM

## 2019-11-20 PROCEDURE — 76819 PR US, OB, FETAL BIOPHYSICAL, W/O NST: ICD-10-PCS | Mod: 26,,, | Performed by: OBSTETRICS & GYNECOLOGY

## 2019-11-20 PROCEDURE — 99999 PR PBB SHADOW E&M-EST. PATIENT-LVL II: ICD-10-PCS | Mod: PBBFAC,,, | Performed by: OBSTETRICS & GYNECOLOGY

## 2019-11-20 PROCEDURE — 0502F SUBSEQUENT PRENATAL CARE: CPT | Mod: CPTII,S$GLB,, | Performed by: OBSTETRICS & GYNECOLOGY

## 2019-11-20 PROCEDURE — 76819 FETAL BIOPHYS PROFIL W/O NST: CPT | Mod: 26,,, | Performed by: OBSTETRICS & GYNECOLOGY

## 2019-11-20 PROCEDURE — 0502F PR SUBSEQUENT PRENATAL CARE: ICD-10-PCS | Mod: CPTII,S$GLB,, | Performed by: OBSTETRICS & GYNECOLOGY

## 2019-11-20 PROCEDURE — 99999 PR PBB SHADOW E&M-EST. PATIENT-LVL II: CPT | Mod: PBBFAC,,, | Performed by: OBSTETRICS & GYNECOLOGY

## 2019-11-20 PROCEDURE — 76819 FETAL BIOPHYS PROFIL W/O NST: CPT

## 2019-11-20 PROCEDURE — 87081 CULTURE SCREEN ONLY: CPT

## 2019-11-20 RX ORDER — DEXTROAMPHETAMINE SACCHARATE, AMPHETAMINE ASPARTATE MONOHYDRATE, DEXTROAMPHETAMINE SULFATE AND AMPHETAMINE SULFATE 2.5; 2.5; 2.5; 2.5 MG/1; MG/1; MG/1; MG/1
10 CAPSULE, EXTENDED RELEASE ORAL DAILY
Qty: 30 CAPSULE | Refills: 0 | Status: SHIPPED | OUTPATIENT
Start: 2019-11-20 | End: 2019-12-26 | Stop reason: SDUPTHER

## 2019-11-20 RX ORDER — BETAMETHASONE SODIUM PHOSPHATE AND BETAMETHASONE ACETATE 3; 3 MG/ML; MG/ML
INJECTION, SUSPENSION INTRA-ARTICULAR; INTRALESIONAL; INTRAMUSCULAR; SOFT TISSUE
Status: DISCONTINUED
Start: 2019-11-20 | End: 2019-11-21 | Stop reason: HOSPADM

## 2019-11-20 NOTE — PROGRESS NOTES
GOOD FM, NO UC AND NO PV LOSS.  PER MFM WILL NOTIFY PEDS OF HER CARDIAC HX AT TIME OF DELIVERY FOR  ECHO.  GBS SUBMITTED AND REF LABS; LABOR INSTRUCTIONS AND WARNING SIGNS.  REMINDED RE WEEKLY PNT AND WILL REFER OVER TODAY.  REFILL ADDERALL AND OK TO GO TO [facility] FOR NEPHEW'S PLAY.  CLOSED/ 60%/ -3, POST, MED.  DISCUSSED UNMEDICATED LABOR AND .  F/U WEEKLY

## 2019-11-22 LAB — BACTERIA SPEC AEROBE CULT: NORMAL

## 2019-11-27 ENCOUNTER — ROUTINE PRENATAL (OUTPATIENT)
Dept: OBSTETRICS AND GYNECOLOGY | Facility: CLINIC | Age: 39
End: 2019-11-27
Payer: COMMERCIAL

## 2019-11-27 ENCOUNTER — PATIENT MESSAGE (OUTPATIENT)
Dept: HEPATOLOGY | Facility: CLINIC | Age: 39
End: 2019-11-27

## 2019-11-27 ENCOUNTER — HOSPITAL ENCOUNTER (OUTPATIENT)
Dept: PERINATAL CARE | Facility: OTHER | Age: 39
Discharge: HOME | End: 2019-11-27
Attending: OBSTETRICS & GYNECOLOGY
Payer: COMMERCIAL

## 2019-11-27 ENCOUNTER — PATIENT MESSAGE (OUTPATIENT)
Dept: HEMATOLOGY/ONCOLOGY | Facility: CLINIC | Age: 39
End: 2019-11-27

## 2019-11-27 VITALS
WEIGHT: 179.44 LBS | SYSTOLIC BLOOD PRESSURE: 114 MMHG | DIASTOLIC BLOOD PRESSURE: 68 MMHG | BODY MASS INDEX: 29.86 KG/M2

## 2019-11-27 DIAGNOSIS — Z3A.37 PREGNANCY WITH 37 WEEKS COMPLETED GESTATION: Primary | ICD-10-CM

## 2019-11-27 DIAGNOSIS — O09.513 PRIMIGRAVIDA OF ADVANCED MATERNAL AGE IN THIRD TRIMESTER: ICD-10-CM

## 2019-11-27 PROCEDURE — 0502F SUBSEQUENT PRENATAL CARE: CPT | Mod: CPTII,S$GLB,, | Performed by: OBSTETRICS & GYNECOLOGY

## 2019-11-27 PROCEDURE — 99999 PR PBB SHADOW E&M-EST. PATIENT-LVL II: CPT | Mod: PBBFAC,,, | Performed by: OBSTETRICS & GYNECOLOGY

## 2019-11-27 PROCEDURE — 99999 PR PBB SHADOW E&M-EST. PATIENT-LVL II: ICD-10-PCS | Mod: PBBFAC,,, | Performed by: OBSTETRICS & GYNECOLOGY

## 2019-11-27 PROCEDURE — 76819 FETAL BIOPHYS PROFIL W/O NST: CPT | Mod: 26,,, | Performed by: OBSTETRICS & GYNECOLOGY

## 2019-11-27 PROCEDURE — 76819 FETAL BIOPHYS PROFIL W/O NST: CPT

## 2019-11-27 PROCEDURE — 0502F PR SUBSEQUENT PRENATAL CARE: ICD-10-PCS | Mod: CPTII,S$GLB,, | Performed by: OBSTETRICS & GYNECOLOGY

## 2019-11-27 PROCEDURE — 76819 PR US, OB, FETAL BIOPHYSICAL, W/O NST: ICD-10-PCS | Mod: 26,,, | Performed by: OBSTETRICS & GYNECOLOGY

## 2019-11-27 NOTE — PROGRESS NOTES
GOOD FM, SCATTERED AND MILD UC AND NO PV LOSS.  WAS IN MOBILE LAST WEEKEND FOR THE 7/8TH GRADE SEUSSICAL, AND CX HAS NOT CHANGED SIGNIFICANTLY.  F/U WEEKLY

## 2019-11-28 ENCOUNTER — PATIENT MESSAGE (OUTPATIENT)
Dept: HEMATOLOGY/ONCOLOGY | Facility: CLINIC | Age: 39
End: 2019-11-28

## 2019-11-28 ENCOUNTER — PATIENT MESSAGE (OUTPATIENT)
Dept: HEPATOLOGY | Facility: CLINIC | Age: 39
End: 2019-11-28

## 2019-11-30 ENCOUNTER — PATIENT MESSAGE (OUTPATIENT)
Dept: OBSTETRICS AND GYNECOLOGY | Facility: CLINIC | Age: 39
End: 2019-11-30

## 2019-12-02 ENCOUNTER — TELEPHONE (OUTPATIENT)
Dept: OBSTETRICS AND GYNECOLOGY | Facility: CLINIC | Age: 39
End: 2019-12-02

## 2019-12-02 ENCOUNTER — TELEPHONE (OUTPATIENT)
Dept: HEMATOLOGY/ONCOLOGY | Facility: CLINIC | Age: 39
End: 2019-12-02

## 2019-12-02 ENCOUNTER — PATIENT MESSAGE (OUTPATIENT)
Dept: HEMATOLOGY/ONCOLOGY | Facility: CLINIC | Age: 39
End: 2019-12-02

## 2019-12-02 DIAGNOSIS — E83.110 HEREDITARY HEMOCHROMATOSIS: Primary | ICD-10-CM

## 2019-12-02 NOTE — TELEPHONE ENCOUNTER
----- Message from Ann Cole sent at 12/2/2019  8:49 AM CST -----  Contact: SOSA CASTRO [[id number]]  Name of Who is Calling: SOSA CASTRO [[id number]]      What is the request in detail: patient would like to schedule routine ob appt on 12/04. Please call     Can the clinic reply by [facility]: no      What Number to Call Back if not in [facility]: [phone]

## 2019-12-02 NOTE — TELEPHONE ENCOUNTER
----- Message from Linette Sauer sent at 12/2/2019 11:47 AM CST -----  Contact: Pt  Pt states she would like reschedule her lab apt to Jan 24, 2020    Please advise pt [phone]

## 2019-12-02 NOTE — TELEPHONE ENCOUNTER
Received a [facility] message requesting her labs to be rescheduled to Jan 24th. Done. I replied to Ms Garcia via [facility] messaging. I informed her the appointment has been rescheduled to the date she request.

## 2019-12-04 ENCOUNTER — PATIENT MESSAGE (OUTPATIENT)
Dept: OBSTETRICS AND GYNECOLOGY | Facility: CLINIC | Age: 39
End: 2019-12-04

## 2019-12-04 ENCOUNTER — HOSPITAL ENCOUNTER (OUTPATIENT)
Dept: PERINATAL CARE | Facility: OTHER | Age: 39
Discharge: HOME | End: 2019-12-04
Attending: OBSTETRICS & GYNECOLOGY
Payer: COMMERCIAL

## 2019-12-04 ENCOUNTER — ROUTINE PRENATAL (OUTPATIENT)
Dept: OBSTETRICS AND GYNECOLOGY | Facility: CLINIC | Age: 39
End: 2019-12-04
Payer: COMMERCIAL

## 2019-12-04 VITALS — SYSTOLIC BLOOD PRESSURE: 121 MMHG | BODY MASS INDEX: 30.45 KG/M2 | WEIGHT: 183 LBS | DIASTOLIC BLOOD PRESSURE: 77 MMHG

## 2019-12-04 DIAGNOSIS — O09.513 PRIMIGRAVIDA OF ADVANCED MATERNAL AGE IN THIRD TRIMESTER: ICD-10-CM

## 2019-12-04 DIAGNOSIS — Z3A.38 38 WEEKS GESTATION OF PREGNANCY: Primary | ICD-10-CM

## 2019-12-04 DIAGNOSIS — Z34.93 PRENATAL CARE IN THIRD TRIMESTER: ICD-10-CM

## 2019-12-04 PROCEDURE — 76819 FETAL BIOPHYS PROFIL W/O NST: CPT

## 2019-12-04 PROCEDURE — 0502F SUBSEQUENT PRENATAL CARE: CPT | Mod: CPTII,S$GLB,, | Performed by: OBSTETRICS & GYNECOLOGY

## 2019-12-04 PROCEDURE — 99999 PR PBB SHADOW E&M-EST. PATIENT-LVL II: CPT | Mod: PBBFAC,,,

## 2019-12-04 PROCEDURE — 76819 FETAL BIOPHYS PROFIL W/O NST: CPT | Mod: 26,,, | Performed by: OBSTETRICS & GYNECOLOGY

## 2019-12-04 PROCEDURE — 76819 PR US, OB, FETAL BIOPHYSICAL, W/O NST: ICD-10-PCS | Mod: 26,,, | Performed by: OBSTETRICS & GYNECOLOGY

## 2019-12-04 PROCEDURE — 99999 PR PBB SHADOW E&M-EST. PATIENT-LVL II: ICD-10-PCS | Mod: PBBFAC,,,

## 2019-12-04 PROCEDURE — 0502F PR SUBSEQUENT PRENATAL CARE: ICD-10-PCS | Mod: CPTII,S$GLB,, | Performed by: OBSTETRICS & GYNECOLOGY

## 2019-12-04 NOTE — PROGRESS NOTES
Pt [facility], 38w5d, for [facility] visit. + FM, denies LOF, VB or reg ctx. Pt c/o low back pain and pelvic pressure. Discussed that if she has a belly band or can get one, this may help support her belly and take some of the weight off her pelvis and support her back. Also suggested tylenol and warm baths as needed.   -Reviewed labor/bleeding/ROM precautions, kick counts and when to call or go to [facility]  -Next visit with Dr. Bhakta in 1 week - will reach out for her staff to help schedule.

## 2019-12-05 ENCOUNTER — PATIENT MESSAGE (OUTPATIENT)
Dept: OBSTETRICS AND GYNECOLOGY | Facility: CLINIC | Age: 39
End: 2019-12-05

## 2019-12-09 ENCOUNTER — PATIENT MESSAGE (OUTPATIENT)
Dept: OBSTETRICS AND GYNECOLOGY | Facility: CLINIC | Age: 39
End: 2019-12-09

## 2019-12-09 DIAGNOSIS — O09.513 AMA (ADVANCED MATERNAL AGE) PRIMIGRAVIDA 35+, THIRD TRIMESTER: Primary | ICD-10-CM

## 2019-12-09 NOTE — TELEPHONE ENCOUNTER
"MESSAGE:    The craziness involved with this wedding has knocked my schedule to hell, but I'm trying to regroup today to get ready to return to the office tomorrow.  I am also not much of a Friday the  fan, so why don't we get things started on Thursday this week.  That way the chances of meeting your boy by your due date are very good.      Because the insurance companies count "midnights" when determining length of stay, our inductions start unreasonably early.  There's a slot on Thursday that has you arriving just after midnight - your nurse would establish an IV, and we'd treat with oral medication (and place a balloon to put pressure on the cervix if we can as well) to get your cervix ready for labor.  Very little true laboring would probably happen until later on in the day, so tell the family to wait patiently at home until there's some cervical dilation.  When the cervix is sufficiently dilated or when contractions are sufficiently frequent, we start pitocin to really get the process under way.  But patience is the watchword of the whole experience - we don't want to prematurely resort to a . . .     I'll put the request in for that induction slot, but we can change plans if that timing is disastrous for you.  Am planning to be back tomorrow, so we can discuss plans/concerns them.  In the meantime, please don't worry - going forward pregnant forever is not a feasible option.    "

## 2019-12-10 ENCOUNTER — TELEPHONE (OUTPATIENT)
Dept: OBSTETRICS AND GYNECOLOGY | Facility: CLINIC | Age: 39
End: 2019-12-10

## 2019-12-11 ENCOUNTER — HOSPITAL ENCOUNTER (OUTPATIENT)
Dept: PERINATAL CARE | Facility: OTHER | Age: 39
Discharge: HOME | End: 2019-12-11
Attending: OBSTETRICS & GYNECOLOGY
Payer: COMMERCIAL

## 2019-12-11 DIAGNOSIS — O09.513 PRIMIGRAVIDA OF ADVANCED MATERNAL AGE IN THIRD TRIMESTER: ICD-10-CM

## 2019-12-11 PROCEDURE — 76819 PR US, OB, FETAL BIOPHYSICAL, W/O NST: ICD-10-PCS | Mod: 26,,, | Performed by: PEDIATRICS

## 2019-12-11 PROCEDURE — 76819 FETAL BIOPHYS PROFIL W/O NST: CPT | Mod: 26,,, | Performed by: PEDIATRICS

## 2019-12-11 PROCEDURE — 76819 FETAL BIOPHYS PROFIL W/O NST: CPT

## 2019-12-12 ENCOUNTER — ANESTHESIA EVENT (OUTPATIENT)
Dept: OBSTETRICS AND GYNECOLOGY | Facility: OTHER | Age: 39
End: 2019-12-12
Payer: COMMERCIAL

## 2019-12-12 ENCOUNTER — HOSPITAL ENCOUNTER (INPATIENT)
Facility: OTHER | Age: 39
LOS: 3 days | Discharge: HOME | End: 2019-12-15
Attending: OBSTETRICS & GYNECOLOGY | Admitting: OBSTETRICS & GYNECOLOGY
Payer: COMMERCIAL

## 2019-12-12 ENCOUNTER — ANESTHESIA (OUTPATIENT)
Dept: OBSTETRICS AND GYNECOLOGY | Facility: OTHER | Age: 39
End: 2019-12-12
Payer: COMMERCIAL

## 2019-12-12 DIAGNOSIS — O09.513 AMA (ADVANCED MATERNAL AGE) PRIMIGRAVIDA 35+, THIRD TRIMESTER: ICD-10-CM

## 2019-12-12 LAB
ABO + RH BLD: NORMAL
BASOPHILS # BLD AUTO: 0.03 K/UL (ref 0–0.2)
BASOPHILS NFR BLD: 0.3 % (ref 0–1.9)
BLD GP AB SCN CELLS X3 SERPL QL: NORMAL
DIFFERENTIAL METHOD: ABNORMAL
EOSINOPHIL # BLD AUTO: 0.2 K/UL (ref 0–0.5)
EOSINOPHIL NFR BLD: 1.8 % (ref 0–8)
ERYTHROCYTE [DISTWIDTH] IN BLOOD BY AUTOMATED COUNT: 12.7 % (ref 11.5–14.5)
HCT VFR BLD AUTO: 40.1 % (ref 37–48.5)
HGB BLD-MCNC: 13.9 G/DL (ref 12–16)
IMM GRANULOCYTES # BLD AUTO: 0.08 K/UL (ref 0–0.04)
IMM GRANULOCYTES NFR BLD AUTO: 0.9 % (ref 0–0.5)
LYMPHOCYTES # BLD AUTO: 2 K/UL (ref 1–4.8)
LYMPHOCYTES NFR BLD: 20.9 % (ref 18–48)
MCH RBC QN AUTO: 33.3 PG (ref 27–31)
MCHC RBC AUTO-ENTMCNC: 34.7 G/DL (ref 32–36)
MCV RBC AUTO: 96 FL (ref 82–98)
MONOCYTES # BLD AUTO: 0.6 K/UL (ref 0.3–1)
MONOCYTES NFR BLD: 6.2 % (ref 4–15)
NEUTROPHILS # BLD AUTO: 6.5 K/UL (ref 1.8–7.7)
NEUTROPHILS NFR BLD: 69.9 % (ref 38–73)
NRBC BLD-RTO: 0 /100 WBC
PLATELET # BLD AUTO: 207 K/UL (ref 150–350)
PMV BLD AUTO: 12.2 FL (ref 9.2–12.9)
RBC # BLD AUTO: 4.18 M/UL (ref 4–5.4)
WBC # BLD AUTO: 9.34 K/UL (ref 3.9–12.7)

## 2019-12-12 PROCEDURE — 59200 INSERT CERVICAL DILATOR: CPT

## 2019-12-12 PROCEDURE — 63600175 PHARM REV CODE 636 W HCPCS: Performed by: OBSTETRICS & GYNECOLOGY

## 2019-12-12 PROCEDURE — 86901 BLOOD TYPING SEROLOGIC RH(D): CPT

## 2019-12-12 PROCEDURE — 25000003 PHARM REV CODE 250: Performed by: STUDENT IN AN ORGANIZED HEALTH CARE EDUCATION/TRAINING PROGRAM

## 2019-12-12 PROCEDURE — 85025 COMPLETE CBC W/AUTO DIFF WBC: CPT

## 2019-12-12 PROCEDURE — 63600175 PHARM REV CODE 636 W HCPCS: Performed by: STUDENT IN AN ORGANIZED HEALTH CARE EDUCATION/TRAINING PROGRAM

## 2019-12-12 PROCEDURE — 72100003 HC LABOR CARE, EA. ADDL. 8 HRS

## 2019-12-12 PROCEDURE — 11000001 HC ACUTE MED/SURG PRIVATE ROOM

## 2019-12-12 PROCEDURE — 72100002 HC LABOR CARE, 1ST 8 HOURS

## 2019-12-12 RX ORDER — OXYTOCIN/RINGER'S LACTATE 30/500 ML
95 PLASTIC BAG, INJECTION (ML) INTRAVENOUS ONCE
Status: CANCELLED | OUTPATIENT
Start: 2019-12-12 | End: 2019-12-12

## 2019-12-12 RX ORDER — CARBOPROST TROMETHAMINE 250 UG/ML
250 INJECTION, SOLUTION INTRAMUSCULAR
Status: CANCELLED | OUTPATIENT
Start: 2019-12-12

## 2019-12-12 RX ORDER — DIPHENOXYLATE HYDROCHLORIDE AND ATROPINE SULFATE 2.5; .025 MG/1; MG/1
1 TABLET ORAL 4 TIMES DAILY PRN
Status: CANCELLED | OUTPATIENT
Start: 2019-12-12

## 2019-12-12 RX ORDER — SODIUM CHLORIDE 9 MG/ML
INJECTION, SOLUTION INTRAVENOUS CONTINUOUS
Status: DISCONTINUED | OUTPATIENT
Start: 2019-12-12 | End: 2019-12-13

## 2019-12-12 RX ORDER — SODIUM CHLORIDE, SODIUM LACTATE, POTASSIUM CHLORIDE, CALCIUM CHLORIDE 600; 310; 30; 20 MG/100ML; MG/100ML; MG/100ML; MG/100ML
INJECTION, SOLUTION INTRAVENOUS CONTINUOUS
Status: DISCONTINUED | OUTPATIENT
Start: 2019-12-12 | End: 2019-12-13

## 2019-12-12 RX ORDER — METHYLERGONOVINE MALEATE 0.2 MG/ML
200 INJECTION INTRAVENOUS
Status: CANCELLED | OUTPATIENT
Start: 2019-12-12

## 2019-12-12 RX ORDER — OXYTOCIN/RINGER'S LACTATE 30/500 ML
95 PLASTIC BAG, INJECTION (ML) INTRAVENOUS ONCE
Status: DISCONTINUED | OUTPATIENT
Start: 2019-12-12 | End: 2019-12-13

## 2019-12-12 RX ORDER — OXYTOCIN/RINGER'S LACTATE 30/500 ML
334 PLASTIC BAG, INJECTION (ML) INTRAVENOUS ONCE
Status: DISCONTINUED | OUTPATIENT
Start: 2019-12-12 | End: 2019-12-13

## 2019-12-12 RX ORDER — ONDANSETRON 8 MG/1
8 TABLET, ORALLY DISINTEGRATING ORAL EVERY 8 HOURS PRN
Status: DISCONTINUED | OUTPATIENT
Start: 2019-12-12 | End: 2019-12-13

## 2019-12-12 RX ORDER — MISOPROSTOL 200 UG/1
800 TABLET ORAL
Status: CANCELLED | OUTPATIENT
Start: 2019-12-12

## 2019-12-12 RX ORDER — SIMETHICONE 80 MG
1 TABLET,CHEWABLE ORAL 4 TIMES DAILY PRN
Status: DISCONTINUED | OUTPATIENT
Start: 2019-12-12 | End: 2019-12-13

## 2019-12-12 RX ORDER — OXYTOCIN/RINGER'S LACTATE 30/500 ML
2 PLASTIC BAG, INJECTION (ML) INTRAVENOUS CONTINUOUS
Status: DISCONTINUED | OUTPATIENT
Start: 2019-12-12 | End: 2019-12-13

## 2019-12-12 RX ORDER — CALCIUM CARBONATE 200(500)MG
500 TABLET,CHEWABLE ORAL 3 TIMES DAILY PRN
Status: DISCONTINUED | OUTPATIENT
Start: 2019-12-12 | End: 2019-12-13

## 2019-12-12 RX ORDER — TERBUTALINE SULFATE 1 MG/ML
0.25 INJECTION SUBCUTANEOUS
Status: DISCONTINUED | OUTPATIENT
Start: 2019-12-12 | End: 2019-12-13

## 2019-12-12 RX ORDER — OXYTOCIN 10 [USP'U]/ML
10 INJECTION, SOLUTION INTRAMUSCULAR; INTRAVENOUS
Status: DISCONTINUED | OUTPATIENT
Start: 2019-12-12 | End: 2019-12-13

## 2019-12-12 RX ORDER — SODIUM CHLORIDE 9 MG/ML
INJECTION, SOLUTION INTRAVENOUS
Status: DISCONTINUED | OUTPATIENT
Start: 2019-12-12 | End: 2019-12-13

## 2019-12-12 RX ADMIN — Medication 2 MILLI-UNITS/MIN: at 07:12

## 2019-12-12 RX ADMIN — MISOPROSTOL 50 MCG: 100 TABLET ORAL at 07:12

## 2019-12-12 RX ADMIN — MISOPROSTOL 50 MCG: 100 TABLET ORAL at 02:12

## 2019-12-12 RX ADMIN — SODIUM CHLORIDE, SODIUM LACTATE, POTASSIUM CHLORIDE, AND CALCIUM CHLORIDE 1000 ML: .6; .31; .03; .02 INJECTION, SOLUTION INTRAVENOUS at 02:12

## 2019-12-12 RX ADMIN — SODIUM CHLORIDE, SODIUM LACTATE, POTASSIUM CHLORIDE, AND CALCIUM CHLORIDE: .6; .31; .03; .02 INJECTION, SOLUTION INTRAVENOUS at 07:12

## 2019-12-12 NOTE — PROGRESS NOTES
"LABOR NOTE    S:  Complaints: No.  Epidural working:  not applicable  MD to bedside for routine cervical check    O: /72   Pulse 73   Temp 98.2 °F (36.8 °C) (Oral)   Resp 16   Ht 5' 5" (1.651 m)   Wt 83.5 kg (184 lb)   LMP 2019   SpO2 97%   Breastfeeding? No   BMI 30.62 kg/m²       FHT: 145, mod anjelica, + accels, - decels Cat 1 (reassuring)  CTX: q 4-7  SVE: 0.5/40/-4 @0730  Cook Balloon placed with patient in stirrup and ringed forceps     ASSESSMENT:   39 y.o.  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 She underwent evaluation and was diagnosed with hemochromatosis and alpha 1 antitrypsin deficiency. She underwent a liver biopsy and was told it was fine. She was treated with phlebotomy started at once weekly. She last received phlebotomy in 2019. 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     Labor Course:   0020: 0.5/40/-4  0210: Cytotec x1  0730: 0.5/40/-4, cytotec x2  1200: Patient checked per Primary OBGYN 0.5/40/-4  1400: Cook Balloon Placed, Cytotec x 3    PLAN:    Continue Close Maternal/Fetal Monitoring  Recheck 4 hours or PRN    Aneta Correa M.D.  PGY-3 OB/GYN  [facility]    "

## 2019-12-12 NOTE — PROGRESS NOTES
12/12/19 0146   [facility] Note - Strip   Strip Reviewed by [facility] Nurse? Yes   [facility] Note - Communication   [place] Nurse Communicated with Bedside Nurse Regarding: Fetal Status   [facility] Note - Notification   Nurse Notified? Yes  (FHT off monitor. Violet WORTHINGTON aware.)   Name of Nurse Violet WORTHINGTON

## 2019-12-12 NOTE — PROGRESS NOTES
"LABOR NOTE    S:  Complaints: No.  Epidural working:  not applicable  MD to bedside for routine cervical check    O: BP (!) 101/55 (BP Location: Right arm, Patient Position: Lying)   Pulse 71   Temp 97.9 °F (36.6 °C) (Oral)   Resp 16   Ht 5' 5" (1.651 m)   Wt 83.5 kg (184 lb)   LMP 2019   SpO2 97%   Breastfeeding? No   BMI 30.62 kg/m²       FHT: 130, mod anjelica, + accels, - decels Cat 1 (reassuring)  CTX: irregular  SVE: 0.5/40/-4 @0730      ASSESSMENT:   39 y.o.  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 She underwent evaluation and was diagnosed with hemochromatosis and alpha 1 antitrypsin deficiency. She underwent a liver biopsy and was told it was fine. She was treated with phlebotomy started at once weekly. She last received phlebotomy in 2019. 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     Labor Course:   0020: 0.5/40/-4  0210: Cytotec x1  0730: 0.5/40/-4, cytotec x2    PLAN:    Continue Close Maternal/Fetal Monitoring  Recheck 4 hours or PRN  Attempt balloon at next check    Naheed Reyes M.D.   OB/GYN  PGY-1    "

## 2019-12-12 NOTE — ANESTHESIA PREPROCEDURE EVALUATION
Danielle Garcia is a 39 y.o. female G1 @ 39w6d presents for scheduled IOL. Hx of hereditary hemochromatosis, alpha 1 antitrypsin deficiency (last lab was normal), last received phlebotomy in 3/2019.    OB History    Para Term  AB Living   1             SAB TAB Ectopic Multiple Live Births                  # Outcome Date GA Lbr Justino/2nd Weight Sex Delivery Anes PTL Lv   1 Current                Wt Readings from Last 1 Encounters:   19 0036 83.5 kg (184 lb)       BP Readings from Last 3 Encounters:   19 112/68   19 121/77   19 114/68       Patient Active Problem List   Diagnosis    Hereditary hemochromatosis    Advanced maternal age, primigravida, antepartum    Cervical high risk human papillomavirus (HPV) DNA test positive    Varicose veins of lower extremity during pregnancy in third trimester    Congenital anomaly of cardiovascular system    Primigravida of advanced maternal age in third trimester    AMA (advanced maternal age) primigravida 35+, third trimester       Past Surgical History:   Procedure Laterality Date    KNEE SURGERY Right 2018    VSD REPAIR      3yo ()       Social History     Socioeconomic History    Marital status:      Spouse name: Not on file    Number of children: Not on file    Years of education: Not on file    Highest education level: Not on file   Occupational History    Not on file   Social Needs    Financial resource strain: Not on file    Food insecurity:     Worry: Not on file     Inability: Not on file    Transportation needs:     Medical: Not on file     Non-medical: Not on file   Tobacco Use    Smoking status: Never Smoker    Smokeless tobacco: Never Used   Substance and Sexual Activity    Alcohol use: Not Currently    Drug use: Never    Sexual activity: Yes     Partners: Male   Lifestyle    Physical activity:     Days per week: Not on file     Minutes per session: Not on file    Stress: Not on file    Relationships    Social connections:     Talks on phone: Not on file     Gets together: Not on file     Attends Shinto service: Not on file     Active member of club or organization: Not on file     Attends meetings of clubs or organizations: Not on file     Relationship status: Not on file   Other Topics Concern    Not on file   Social History Narrative    Not on file         Chemistry        Component Value Date/Time     (L) 10/29/2019 1205    K 3.9 10/29/2019 1205     10/29/2019 1205    CO2 21 (L) 10/29/2019 1205    BUN 4 (L) 10/29/2019 1205    CREATININE 0.6 10/29/2019 1205     10/29/2019 1205        Component Value Date/Time    CALCIUM 9.0 10/29/2019 1205    ALKPHOS 116 10/29/2019 1205    AST 20 10/29/2019 1205    ALT 19 10/29/2019 1205    BILITOT 0.3 10/29/2019 1205    ESTGFRAFRICA >60 10/29/2019 1205    EGFRNONAA >60 10/29/2019 1205            Lab Results   Component Value Date    WBC 7.71 11/27/2019    HGB 13.7 11/27/2019    HCT 40.2 11/27/2019    MCV 96 11/27/2019     11/27/2019       No results for input(s): PT, INR, PROTIME, APTT in the last 72 hours.                  Anesthesia Evaluation    I have reviewed the Patient Summary Reports.    I have reviewed the Nursing Notes.   I have reviewed the Medications.     Review of Systems  Anesthesia Hx:  Neg history of prior surgery. Denies Family Hx of Anesthesia complications.   Denies Personal Hx of Anesthesia complications.   Social:  Non-Smoker    Hematology/Oncology:        Hematology Comments: Hereditary hemochromatosis   Cardiovascular:   Denies MI.  Denies CAD.    Denies CABG/stent.  Denies Dysrhythmias.   Denies Angina.    Pulmonary:   Denies Pneumonia Denies COPD.  Denies Asthma.  Denies Shortness of breath.    Renal/:   Denies Chronic Renal Disease.     Hepatic/GI:   Denies Liver Disease. Alpha 1 antitrypsin deficiency   Neurological:   Denies CVA. Denies Seizures.        Physical Exam  General:  Well nourished     Airway/Jaw/Neck:  Airway Findings: Mouth Opening: Normal Tongue: Normal  Mallampati: I        Eyes/Ears/Nose:  EYES/EARS/NOSE FINDINGS: Normal   Dental:  DENTAL FINDINGS: Normal   Chest/Lungs:  Chest/Lungs Findings: Clear to auscultation, Normal Respiratory Rate     Heart/Vascular:  Heart Findings: Rate: Normal  Rhythm: Regular Rhythm  Sounds: Normal     Abdomen:  Abdomen Findings: Normal    Musculoskeletal:  Musculoskeletal Findings: Normal   Skin:  Skin Findings: Normal    Mental Status:  Mental Status Findings: Normal        Anesthesia Plan  Type of Anesthesia, risks & benefits discussed:  Anesthesia Type:  epidural, CSE, general, spinal  Patient's Preference:   Intra-op Monitoring Plan: standard ASA monitors  Intra-op Monitoring Plan Comments:   Post Op Pain Control Plan: per primary service following discharge from PACU  Post Op Pain Control Plan Comments:   Induction:   IV  Beta Blocker:  Patient is not currently on a Beta-Blocker (No further documentation required).       Informed Consent: Patient understands risks and agrees with Anesthesia plan.  Questions answered. Anesthesia consent signed with patient.  ASA Score: 2     Day of Surgery Review of History & Physical:            Ready For Surgery From Anesthesia Perspective.

## 2019-12-12 NOTE — H&P
HISTORY AND PHYSICAL                                                OBSTETRICS          Subjective:       Danielle Garcia is a 39 y.o.  female with IUP at 39w6d weeks gestation who presents for scheduled IOL.    Patient reports some irregular contractions, denies vaginal bleeding, denies LOF.   Fetal Movement: normal.    This IUP is complicated by:  Hemachromatosis: has been treated with phlebotomy previously, last H/h 13.7.2  Anxiety: continue home wellbutrin, restart adderall post-partum  AMA: maternal age 39    Review of Systems   Constitutional: Negative for activity change, chills and fatigue.   Eyes: Negative for visual disturbance.   Respiratory: Negative for shortness of breath.    Cardiovascular: Negative for chest pain and palpitations.   Gastrointestinal: Negative for abdominal pain, constipation, diarrhea, nausea and vomiting.   Genitourinary: Negative for dysuria, vaginal bleeding, vaginal discharge, vaginal pain and vaginal odor.   Musculoskeletal: Negative for myalgias.   Integumentary:  Negative for rash.   Neurological: Negative for headaches.   Psychiatric/Behavioral: Negative for depression.     PMHx:   Past Medical History:   Diagnosis Date    Hemochromatosis        PSHx:   Past Surgical History:   Procedure Laterality Date    KNEE SURGERY Right 2018    VSD REPAIR      1yo ()       All: Review of patient's allergies indicates:  No Known Allergies    Meds:   Medications Prior to Admission   Medication Sig Dispense Refill Last Dose    buPROPion (WELLBUTRIN XL) 150 MG TB24 tablet TAKE 1 TABLET(150 MG) BY MOUTH EVERY MORNING 30 tablet 0 Taking    dextroamphetamine-amphetamine (ADDERALL XR) 10 MG 24 hr capsule Take 1 capsule (10 mg total) by mouth once daily. 30 capsule 0     pantoprazole (PROTONIX) 20 MG tablet Take 1 tablet (20 mg total) by mouth once daily. 30 tablet 11 Taking    prenatal vit/iron fum/folic ac (PRENATAL 1+1 ORAL) Take by mouth.   Taking     valACYclovir (VALTREX) 500 MG tablet TK 2 TABLETS PO Q 8 H TILL FEVER BLISTER ARE GONE. GF VALTREX  0 Taking       SH:   Social History     Socioeconomic History    Marital status:      Spouse name: Not on file    Number of children: Not on file    Years of education: Not on file    Highest education level: Not on file   Occupational History    Not on file   Social Needs    Financial resource strain: Not on file    Food insecurity:     Worry: Not on file     Inability: Not on file    Transportation needs:     Medical: Not on file     Non-medical: Not on file   Tobacco Use    Smoking status: Never Smoker    Smokeless tobacco: Never Used   Substance and Sexual Activity    Alcohol use: Not Currently    Drug use: Never    Sexual activity: Yes     Partners: Male   Lifestyle    Physical activity:     Days per week: Not on file     Minutes per session: Not on file    Stress: Not on file   Relationships    Social connections:     Talks on phone: Not on file     Gets together: Not on file     Attends Jew service: Not on file     Active member of club or organization: Not on file     Attends meetings of clubs or organizations: Not on file     Relationship status: Not on file   Other Topics Concern    Not on file   Social History Narrative    Not on file       FH:   Family History   Problem Relation Age of Onset    Breast cancer Neg Hx     Colon cancer Neg Hx     Ovarian cancer Neg Hx        OBHx:   OB History    Para Term  AB Living   1 0 0 0 0 0   SAB TAB Ectopic Multiple Live Births   0 0 0 0 0      # Outcome Date GA Lbr Justino/2nd Weight Sex Delivery Anes PTL Lv   1 Current                Objective:       LMP 2019     There were no vitals filed for this visit.    General:   alert, appears stated age and cooperative, no apparent distress   HENT:  normocephalic, atraumatic   Eyes:  extraocular movements and conjunctivae normal   Neck:  supple, range of motion normal, no  thyromegaly   Lungs:   no respiratory distress   Heart:   regular rate   Abdomen:  soft, non-tender, non-distended but gravid, no rebound or guarding    Extremities negative edema, negative erythema   FHT: 140 bpm, moderate BTBV, +accels, -decels;  Cat 1 (reassuring)                 TOCO: Moderate uterine irritability and irregular contractions   Presentations: cephalic by ultrasound   Cervix:     Dilation: 0.5 - unable to place balloon at this time    Effacement: 50%    Station:  -3    Consistency: soft    Position: posterior     Lab Review  Blood Type O POS  GBBS: negative  Rubella: Immune  RPR: NR  HIV: negative  HepB: negative       Assessment:       39w6d weeks gestation here for IOL    Active Hospital Problems    Diagnosis  POA    AMA (advanced maternal age) primigravida 35+, third trimester [O09.513]  Yes    Primigravida of advanced maternal age in third trimester [O09.513]  Yes    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 She underwent evaluation and was diagnosed with hemochromatosis and alpha 1 antitrypsin deficiency. She underwent a liver biopsy and was told it was fine. She was treated with phlebotomy started at once weekly. She last received phlebotomy in March 2019. 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      Plan:     IOL:  - Risks, benefits, alternatives and possible complications have been discussed in detail with the patient.   - Consents signed and to chart  - Admit to Labor and Delivery unit  - cytoclaudia olson when possible > AROM/pitocin  - Epidural per Anesthesia  - Draw CBC, T&S  - Notify Staff  - Recheck in 4 hrs or PRN    Hemachromatosis:   - has been treated with  phlebotomy previously,   - last H/h 13.7/40.2  - admission CBC pending    Anxiety:   - continue home wellbutrin   - restart adderall post-partum  - benadryl nightly PRN for insomnia    AMA:   - maternal age 39    Post-Partum Hemorrhage risk - low    Radha Geronimo MD, PhD  OBGYN, PGY-4

## 2019-12-13 PROBLEM — O09.513 AMA (ADVANCED MATERNAL AGE) PRIMIGRAVIDA 35+, THIRD TRIMESTER: Status: RESOLVED | Noted: 2019-12-12 | Resolved: 2019-12-13

## 2019-12-13 PROBLEM — O09.519 ADVANCED MATERNAL AGE, PRIMIGRAVIDA, ANTEPARTUM: Status: RESOLVED | Noted: 2019-05-01 | Resolved: 2019-12-13

## 2019-12-13 PROCEDURE — 63600175 PHARM REV CODE 636 W HCPCS: Performed by: STUDENT IN AN ORGANIZED HEALTH CARE EDUCATION/TRAINING PROGRAM

## 2019-12-13 PROCEDURE — 72200005 HC VAGINAL DELIVERY LEVEL II

## 2019-12-13 PROCEDURE — 63600175 PHARM REV CODE 636 W HCPCS: Performed by: OBSTETRICS & GYNECOLOGY

## 2019-12-13 PROCEDURE — 25000003 PHARM REV CODE 250: Performed by: STUDENT IN AN ORGANIZED HEALTH CARE EDUCATION/TRAINING PROGRAM

## 2019-12-13 PROCEDURE — 59409 OBSTETRICAL CARE: CPT | Mod: QY,,, | Performed by: ANESTHESIOLOGY

## 2019-12-13 PROCEDURE — 11000001 HC ACUTE MED/SURG PRIVATE ROOM

## 2019-12-13 PROCEDURE — 27200710 HC EPIDURAL INFUSION PUMP SET: Performed by: STUDENT IN AN ORGANIZED HEALTH CARE EDUCATION/TRAINING PROGRAM

## 2019-12-13 PROCEDURE — 63600175 PHARM REV CODE 636 W HCPCS

## 2019-12-13 PROCEDURE — 72100003 HC LABOR CARE, EA. ADDL. 8 HRS

## 2019-12-13 PROCEDURE — 62326 NJX INTERLAMINAR LMBR/SAC: CPT | Performed by: STUDENT IN AN ORGANIZED HEALTH CARE EDUCATION/TRAINING PROGRAM

## 2019-12-13 PROCEDURE — 59409 PRA ETRICAL CARE,VAG DELIV ONLY: ICD-10-PCS | Mod: QY,,, | Performed by: ANESTHESIOLOGY

## 2019-12-13 PROCEDURE — C1751 CATH, INF, PER/CENT/MIDLINE: HCPCS | Performed by: STUDENT IN AN ORGANIZED HEALTH CARE EDUCATION/TRAINING PROGRAM

## 2019-12-13 PROCEDURE — 59400 OBSTETRICAL CARE: CPT | Mod: GB,,, | Performed by: OBSTETRICS & GYNECOLOGY

## 2019-12-13 PROCEDURE — 59400 PR FULL ROUT OBSTE CARE,VAGINAL DELIV: ICD-10-PCS | Mod: GB,,, | Performed by: OBSTETRICS & GYNECOLOGY

## 2019-12-13 PROCEDURE — 51702 INSERT TEMP BLADDER CATH: CPT

## 2019-12-13 RX ORDER — FAMOTIDINE 10 MG/ML
20 INJECTION INTRAVENOUS ONCE
Status: CANCELLED | OUTPATIENT
Start: 2019-12-13 | End: 2019-12-13

## 2019-12-13 RX ORDER — DIPHENHYDRAMINE HYDROCHLORIDE 50 MG/ML
25 INJECTION INTRAMUSCULAR; INTRAVENOUS EVERY 4 HOURS PRN
Status: DISCONTINUED | OUTPATIENT
Start: 2019-12-13 | End: 2019-12-15 | Stop reason: HOSPADM

## 2019-12-13 RX ORDER — HYDROCORTISONE 25 MG/G
CREAM TOPICAL 3 TIMES DAILY PRN
Status: DISCONTINUED | OUTPATIENT
Start: 2019-12-13 | End: 2019-12-15 | Stop reason: HOSPADM

## 2019-12-13 RX ORDER — SIMETHICONE 80 MG
1 TABLET,CHEWABLE ORAL EVERY 6 HOURS PRN
Status: DISCONTINUED | OUTPATIENT
Start: 2019-12-13 | End: 2019-12-15 | Stop reason: HOSPADM

## 2019-12-13 RX ORDER — PRENATAL WITH FERROUS FUM AND FOLIC ACID 3080; 920; 120; 400; 22; 1.84; 3; 20; 10; 1; 12; 200; 27; 25; 2 [IU]/1; [IU]/1; MG/1; [IU]/1; MG/1; MG/1; MG/1; MG/1; MG/1; MG/1; UG/1; MG/1; MG/1; MG/1; MG/1
1 TABLET ORAL DAILY
Status: DISCONTINUED | OUTPATIENT
Start: 2019-12-13 | End: 2019-12-15 | Stop reason: HOSPADM

## 2019-12-13 RX ORDER — IBUPROFEN 600 MG/1
600 TABLET ORAL EVERY 6 HOURS
Status: DISCONTINUED | OUTPATIENT
Start: 2019-12-13 | End: 2019-12-15 | Stop reason: HOSPADM

## 2019-12-13 RX ORDER — DOCUSATE SODIUM 100 MG/1
200 CAPSULE, LIQUID FILLED ORAL 2 TIMES DAILY PRN
Status: DISCONTINUED | OUTPATIENT
Start: 2019-12-13 | End: 2019-12-15 | Stop reason: HOSPADM

## 2019-12-13 RX ORDER — ONDANSETRON 8 MG/1
8 TABLET, ORALLY DISINTEGRATING ORAL EVERY 8 HOURS PRN
Status: DISCONTINUED | OUTPATIENT
Start: 2019-12-13 | End: 2019-12-15 | Stop reason: HOSPADM

## 2019-12-13 RX ORDER — FENTANYL/BUPIVACAINE/NS/PF 2MCG/ML-.1
PLASTIC BAG, INJECTION (ML) INJECTION CONTINUOUS PRN
Status: DISCONTINUED | OUTPATIENT
Start: 2019-12-13 | End: 2019-12-13

## 2019-12-13 RX ORDER — ACETAMINOPHEN 325 MG/1
650 TABLET ORAL EVERY 6 HOURS PRN
Status: DISCONTINUED | OUTPATIENT
Start: 2019-12-13 | End: 2019-12-15 | Stop reason: HOSPADM

## 2019-12-13 RX ORDER — ONDANSETRON 2 MG/ML
4 INJECTION INTRAMUSCULAR; INTRAVENOUS ONCE
Status: COMPLETED | OUTPATIENT
Start: 2019-12-13 | End: 2019-12-13

## 2019-12-13 RX ORDER — PANTOPRAZOLE SODIUM 40 MG/1
40 TABLET, DELAYED RELEASE ORAL DAILY
Status: DISCONTINUED | OUTPATIENT
Start: 2019-12-13 | End: 2019-12-15 | Stop reason: HOSPADM

## 2019-12-13 RX ORDER — FENTANYL/BUPIVACAINE/NS/PF 2MCG/ML-.1
PLASTIC BAG, INJECTION (ML) INJECTION
Status: DISPENSED
Start: 2019-12-13 | End: 2019-12-13

## 2019-12-13 RX ORDER — OXYTOCIN/RINGER'S LACTATE 30/500 ML
95 PLASTIC BAG, INJECTION (ML) INTRAVENOUS ONCE
Status: COMPLETED | OUTPATIENT
Start: 2019-12-13 | End: 2019-12-13

## 2019-12-13 RX ORDER — IBUPROFEN 600 MG/1
600 TABLET ORAL EVERY 6 HOURS PRN
Qty: 30 TABLET | Refills: 1 | Status: SHIPPED | OUTPATIENT
Start: 2019-12-13 | End: 2020-01-22

## 2019-12-13 RX ORDER — IBUPROFEN 600 MG/1
600 TABLET ORAL EVERY 6 HOURS
Status: DISCONTINUED | OUTPATIENT
Start: 2019-12-13 | End: 2019-12-13

## 2019-12-13 RX ORDER — FENTANYL/BUPIVACAINE/NS/PF 2MCG/ML-.1
PLASTIC BAG, INJECTION (ML) INJECTION CONTINUOUS
Status: DISCONTINUED | OUTPATIENT
Start: 2019-12-13 | End: 2019-12-13

## 2019-12-13 RX ORDER — DIPHENHYDRAMINE HCL 25 MG
25 CAPSULE ORAL EVERY 4 HOURS PRN
Status: DISCONTINUED | OUTPATIENT
Start: 2019-12-13 | End: 2019-12-15 | Stop reason: HOSPADM

## 2019-12-13 RX ORDER — MISOPROSTOL 200 UG/1
TABLET ORAL
Status: DISPENSED
Start: 2019-12-13 | End: 2019-12-13

## 2019-12-13 RX ORDER — OXYTOCIN 10 [USP'U]/ML
INJECTION, SOLUTION INTRAMUSCULAR; INTRAVENOUS
Status: DISCONTINUED
Start: 2019-12-13 | End: 2019-12-13 | Stop reason: WASHOUT

## 2019-12-13 RX ORDER — CARBOPROST TROMETHAMINE 250 UG/ML
INJECTION, SOLUTION INTRAMUSCULAR
Status: DISCONTINUED
Start: 2019-12-13 | End: 2019-12-13 | Stop reason: WASHOUT

## 2019-12-13 RX ORDER — METHYLERGONOVINE MALEATE 0.2 MG/ML
INJECTION INTRAVENOUS
Status: DISCONTINUED
Start: 2019-12-13 | End: 2019-12-13 | Stop reason: WASHOUT

## 2019-12-13 RX ORDER — ONDANSETRON 2 MG/ML
INJECTION INTRAMUSCULAR; INTRAVENOUS
Status: COMPLETED
Start: 2019-12-13 | End: 2019-12-13

## 2019-12-13 RX ORDER — LIDOCAINE HYDROCHLORIDE AND EPINEPHRINE 15; 5 MG/ML; UG/ML
INJECTION, SOLUTION EPIDURAL
Status: DISCONTINUED | OUTPATIENT
Start: 2019-12-13 | End: 2019-12-13

## 2019-12-13 RX ORDER — HYDROCODONE BITARTRATE AND ACETAMINOPHEN 5; 325 MG/1; MG/1
1 TABLET ORAL EVERY 4 HOURS PRN
Status: DISCONTINUED | OUTPATIENT
Start: 2019-12-13 | End: 2019-12-15 | Stop reason: HOSPADM

## 2019-12-13 RX ORDER — SODIUM CITRATE AND CITRIC ACID MONOHYDRATE 334; 500 MG/5ML; MG/5ML
30 SOLUTION ORAL ONCE
Status: CANCELLED | OUTPATIENT
Start: 2019-12-13 | End: 2019-12-13

## 2019-12-13 RX ORDER — BUPROPION HYDROCHLORIDE 150 MG/1
150 TABLET ORAL DAILY
Status: DISCONTINUED | OUTPATIENT
Start: 2019-12-13 | End: 2019-12-15 | Stop reason: HOSPADM

## 2019-12-13 RX ORDER — HYDROCODONE BITARTRATE AND ACETAMINOPHEN 10; 325 MG/1; MG/1
1 TABLET ORAL EVERY 4 HOURS PRN
Status: DISCONTINUED | OUTPATIENT
Start: 2019-12-13 | End: 2019-12-15 | Stop reason: HOSPADM

## 2019-12-13 RX ADMIN — SODIUM CHLORIDE, SODIUM LACTATE, POTASSIUM CHLORIDE, AND CALCIUM CHLORIDE: .6; .31; .03; .02 INJECTION, SOLUTION INTRAVENOUS at 12:12

## 2019-12-13 RX ADMIN — IBUPROFEN 600 MG: 600 TABLET, FILM COATED ORAL at 10:12

## 2019-12-13 RX ADMIN — SODIUM CHLORIDE, SODIUM LACTATE, POTASSIUM CHLORIDE, AND CALCIUM CHLORIDE: .6; .31; .03; .02 INJECTION, SOLUTION INTRAVENOUS at 03:12

## 2019-12-13 RX ADMIN — LIDOCAINE HYDROCHLORIDE,EPINEPHRINE BITARTRATE 3 ML: 15; .005 INJECTION, SOLUTION EPIDURAL; INFILTRATION; INTRACAUDAL; PERINEURAL at 12:12

## 2019-12-13 RX ADMIN — Medication 10 ML/HR: at 12:12

## 2019-12-13 RX ADMIN — Medication 334 MILLI-UNITS/MIN: at 08:12

## 2019-12-13 RX ADMIN — IBUPROFEN 600 MG: 600 TABLET, FILM COATED ORAL at 09:12

## 2019-12-13 RX ADMIN — IBUPROFEN 600 MG: 600 TABLET, FILM COATED ORAL at 04:12

## 2019-12-13 RX ADMIN — ACETAMINOPHEN 650 MG: 325 TABLET ORAL at 01:12

## 2019-12-13 RX ADMIN — ONDANSETRON 4 MG: 2 INJECTION INTRAMUSCULAR; INTRAVENOUS at 01:12

## 2019-12-13 RX ADMIN — HYDROCODONE BITARTRATE AND ACETAMINOPHEN 1 TABLET: 5; 325 TABLET ORAL at 10:12

## 2019-12-13 RX ADMIN — HYDROCODONE BITARTRATE AND ACETAMINOPHEN 1 TABLET: 5; 325 TABLET ORAL at 08:12

## 2019-12-13 RX ADMIN — DOCUSATE SODIUM 200 MG: 100 CAPSULE, LIQUID FILLED ORAL at 08:12

## 2019-12-13 RX ADMIN — Medication 95 MILLI-UNITS/MIN: at 09:12

## 2019-12-13 NOTE — PROGRESS NOTES
"LABOR NOTE    S:  Complaints: No.  Epidural working:  not applicable  MD to bedside for routine cervical check    O: /72   Pulse 73   Temp 98.2 °F (36.8 °C) (Oral)   Resp 16   Ht 5' 5" (1.651 m)   Wt 83.5 kg (184 lb)   LMP 2019   SpO2 97%   Breastfeeding? No   BMI 30.62 kg/m²     FHT: 140, mod anjelica, + accels, - decels Cat 1 (reassuring)  CTX: q 4-5  SVE: 3/70/-2 @ 1830  Cook in placed      ASSESSMENT:   39 y.o.  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 She underwent evaluation and was diagnosed with hemochromatosis and alpha 1 antitrypsin deficiency. She underwent a liver biopsy and was told it was fine. She was treated with phlebotomy started at once weekly. She last received phlebotomy in 2019. 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     Labor Course:   0020: 0.540/-4  0210: Cytotec x1  0730: 0.540/-4, cytotec x2  1200: Patient checked per Primary OBGYN 0.5/-4  1400: Cook Balloon Placed, Cytotec x 3  1830: 3/70/-2, cook in place, pitocin ordered to start @ 2 mU/min    PLAN:    Continue Close Maternal/Fetal Monitoring  Recheck 4 hours or PRN    Radha Geronimo MD, PhD  OBGYN, PGY-4   "

## 2019-12-13 NOTE — L&D DELIVERY NOTE
[facility]  Vaginal Delivery   Operative Note    SUMMARY     Normal spontaneous vaginal delivery of live infant, was placed on mothers abdomen for skin to skin and bulb suctioning performed.  Infant delivered position OA over intact perineum.  Nuchal cord: No.    Spontaneous delivery of placenta and IV pitocin given noting good uterine tone.  1st degree laceration noted posterior vagina near fourchette Repaired with single 2-0 vicryl with figure of 8 suture.  Patient tolerated delivery well. Sponge needle and lap counted correctly x2.    I WAS SCRUBBED AND PRESENT THROUGHOUT    Indications:  (spontaneous vaginal delivery)  Pregnancy complicated by:   Patient Active Problem List   Diagnosis    Hereditary hemochromatosis    Cervical high risk human papillomavirus (HPV) DNA test positive    Varicose veins of lower extremity during pregnancy in third trimester    Congenital anomaly of cardiovascular system     (spontaneous vaginal delivery)     Admitting GA: 40w0d    Delivery Information for Jeffery Garcia    Birth information:  YOB: 2020   Time of birth: 8:40 AM   Sex: male   Head Delivery Date/Time:     Delivery type:    Gestational Age: 40w0d    Delivery Providers    Delivering clinician:             Measurements    Weight:  3572 g  Length:           Apgars    Living status:    Apgars:   1 min.:   5 min.:   10 min.:   15 min.:   20 min.:     Skin color:   1 1      Heart rate:   2 2      Reflex irritability:   2 2      Muscle tone:   2 2      Respiratory effort:   2 2      Total:   9 9                             Interventions/Resuscitation         Cord    No data filed        Placenta    Placenta delivery date/time:    Placenta removal:             Labor Events:       labor:       Labor Onset Date/Time:         Dilation Complete Date/Time:         Start Pushing Date/Time:         Start Pushing Date/Time:       Rupture Date/Time: 19  0057          Rupture type:           Fluid Amount:        Fluid Color: Clear      Fluid Odor:        Membrane Status: ARM (Artificial Rupture)               steroids:       Antibiotics given for GBS:       Induction:       Indications for induction:        Augmentation:       Indications for augmentation:       Labor complications:       Additional complications:          Cervical ripening:                     Delivery:      Episiotomy:       Indication for Episiotomy:       Perineal Lacerations:   Repaired:      Periurethral Laceration:   Repaired:     Labial Laceration:   Repaired:     Sulcus Laceration:   Repaired:     Vaginal Laceration:   Repaired:     Cervical Laceration:   Repaired:     Repair suture:       Repair # of packets:       Last Value - EBL - Nursing (mL):       Sum - EBL - Nursing (mL): 0     Last Value - EBL - Anesthesia (mL):      Calculated QBL (mL):        Vaginal Sweep Performed:       Surgicount Correct:         Other providers:            Details (if applicable):  Trial of Labor      Categorization:      Priority:     Indications for :     Incision Type:       Additional  information:  Forceps:    Vacuum:    Breech:    Observed anomalies    Other (Comments):         Nell Gregorio MD  OBGYN PGY-1

## 2019-12-13 NOTE — PROGRESS NOTES
"LABOR NOTE    S:  Complaints: No.  Epidural working:  not applicable  MD to bedside for routine cervical check    O: /72   Pulse 73   Temp 98.2 °F (36.8 °C) (Oral)   Resp 16   Ht 5' 5" (1.651 m)   Wt 83.5 kg (184 lb)   LMP 2019   SpO2 97%   Breastfeeding? No   BMI 30.62 kg/m²     FHT: 140, mod anjelica, + accels, - decels Cat 1 (reassuring)  CTX: q 4-5  SVE: /-3 @ 1930  Cook balloon out       ASSESSMENT:   39 y.o.  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 She underwent evaluation and was diagnosed with hemochromatosis and alpha 1 antitrypsin deficiency. She underwent a liver biopsy and was told it was fine. She was treated with phlebotomy started at once weekly. She last received phlebotomy in 2019. 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     Labor Course:   0020: 0.5/-4  0210: Cytotec x1  0730: 0.540/-4, cytotec x2  1200: Patient checked per Primary OBGYN 0.-4  1400: Cook Balloon Placed, Cytotec x 3  1830: 3/70/-2, cook in place, pitocin ordered to start @ 2 mU/min  1930: -3, cook balloon out     PLAN:    Continue Close Maternal/Fetal Monitoring  Recheck 4 hours or PRN  AROM next check if possible    Radha Geronimo MD, PhD  OBGYN, PGY-4   "

## 2019-12-13 NOTE — PROGRESS NOTES
"LABOR NOTE    S:  Complaints: No.  Epidural working:  Yes, working well  MD to bedside for routine cervical check    O: BP (!) 91/54   Pulse 63   Temp 98 °F (36.7 °C)   Resp 18   Ht 5' 5" (1.651 m)   Wt 83.5 kg (184 lb)   LMP 2019   SpO2 98%   Breastfeeding? No   BMI 30.62 kg/m²     FHT: 140, mod anjelica, + accels, - decels Cat 1 (reassuring)  CTX: q 4-5  SVE: /-2; AROM, clear    ASSESSMENT:   39 y.o.  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 She underwent evaluation and was diagnosed with hemochromatosis and alpha 1 antitrypsin deficiency. She underwent a liver biopsy and was told it was fine. She was treated with phlebotomy started at once weekly. She last received phlebotomy in 2019. 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     Labor Course:   0020: 0.5/-4  0210: Cytotec x1  0730: 0.5/-4, cytotec x2  1200: Patient checked per Primary OBGYN 0.-4  1400: Cook Balloon Placed, Cytotec x 3  1830: 3/70/-2, cook in place, pitocin ordered to start @ 2 mU/min  1930: 3, cook balloon out   2230: 3, pitocin @ 12 mU  0030: -2, pitocin @ 18 mU, AROM, clear      PLAN:  Continue Close Maternal/Fetal Monitoring  Recheck 2-4 hours or PRN      Radha Geronimo MD, PhD  OBGYN, PGY-4   "

## 2019-12-13 NOTE — PLAN OF CARE
Problem: Adult Inpatient Plan of Care  Goal: Optimal Comfort and Wellbeing  Outcome: Ongoing, Progressing     Problem: Bleeding (Labor)  Goal: Hemostasis  Outcome: Ongoing, Progressing   Discussed postpartum bleeding checks and overall comfort with patient.

## 2019-12-13 NOTE — PROGRESS NOTES
"LABOR NOTE    S:  Complaints: No.  Epidural working:  Yes, working well  MD to bedside for routine cervical check    O: BP (!) 112/57   Pulse 72   Temp 98.2 °F (36.8 °C)   Resp 18   Ht 5' 5" (1.651 m)   Wt 83.5 kg (184 lb)   LMP 2019   SpO2 97%   Breastfeeding? No   BMI 30.62 kg/m²     FHT: 140, mod anjelica, + accels, - decels Cat 1 (reassuring)  CTX: q 2  SVE: /-2; s/p AROM, clear 0100    ASSESSMENT:   39 y.o.  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 She underwent evaluation and was diagnosed with hemochromatosis and alpha 1 antitrypsin deficiency. She underwent a liver biopsy and was told it was fine. She was treated with phlebotomy started at once weekly. She last received phlebotomy in 2019. 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     Labor Course:   0020: 0.-4  0210: Cytotec x1  0730: 0.-4, cytotec x2  1200: Patient checked per Primary OBGYN 0.-4  1400: Cook Balloon Placed, Cytotec x 3  1830: 3/70/-2, cook in place, pitocin ordered to start @ 2 mU/min  1930: 3, cook balloon out   2230: 3, pitocin @ 12 mU  0030: 2, pitocin @ 18 mU, AROM, clear  0330: 2, pitocin @ 26 mU    PLAN:  Continue Close Maternal/Fetal Monitoring  Recheck 2-4 hours or PRN    IUPC next check      Radha Geronimo MD, PhD  OBGYN, PGY-4   "

## 2019-12-13 NOTE — PROGRESS NOTES
POSTPARTUM PROGRESS NOTE     Danielle Garcia is a 39 y.o. female PPD #1 status post Spontaneous vaginal delivery at 40w0d in a pregnancy complicated by hemochromatosis, anxiety, and AMA. Patient is doing well this morning. She denies nausea, vomiting, fever or chills.  Patient reports mild abdominal pain that is adequately relieved by oral pain medications. Lochia is mild to moderate  and stable. Patient is voiding without difficulty and ambulating with no difficulty. She has passed flatus.  Patient does plan to breast feed. Per primary OB for contraception. She desires circumcision.     Objective:       Temp:  [98.2 °F (36.8 °C)-98.6 °F (37 °C)] 98.6 °F (37 °C)  Pulse:  [63-83] 76  Resp:  [18] 18  SpO2:  [95 %-99 %] 95 %  BP: (108-117)/(56-74) 115/74    General:   alert, appears stated age and cooperative   Lungs:   Non-labored respirations    Heart:   regular rate and rhythm   Abdomen:  Soft, nondistended    Uterus:  firm located at the umblicus.    Extremities: no pedal edema noted     Lab Review  Recent Labs   Lab 19  0133 19  0329   WBC 9.34 12.17   HGB 13.9 11.7*   HCT 40.1 34.5*   MCV 96 98    160        No results for input(s): NA, K, CL, CO2, BUN, CREATININE, GLU, PROT, BILITOT, ALKPHOS, ALT, AST, MG, PHOS in the last 168 hours.       I/O    Intake/Output Summary (Last 24 hours) at 2019 1002  Last data filed at 2019 1500  Gross per 24 hour   Intake --   Output 900 ml   Net -900 ml        Assessment:     Patient Active Problem List   Diagnosis    Hereditary hemochromatosis    Cervical high risk human papillomavirus (HPV) DNA test positive    Varicose veins of lower extremity during pregnancy in third trimester    Congenital anomaly of cardiovascular system     (spontaneous vaginal delivery)        Plan:   1. Postpartum care:  - Patient doing well. Continue routine management and advances.  - Continue PO pain meds. Pain well controlled.  - Heme: H/h  >>>  12/35  - Encourage ambulation  - Circumcision desired- consents signed and to chart  - Contraception  to be discussed at 6 week pp visit  - Lactation consult PRN    2. Hemachromatosis:   - has been treated with phlebotomy previously,   - Admission  14/40     3. Anxiety:   - continue home wellbutrin   - restart adderall post-partum  - benadryl nightly PRN for insomnia     Dispo: As patient meets milestones, will plan to discharge PPD1-2.    Naheed Reyes M.D.   OB/GYN  PGY-1

## 2019-12-13 NOTE — PROGRESS NOTES
"LABOR NOTE    S:  Complaints: No.  Epidural working:  Yes, working well  MD to bedside for routine cervical check    O: /65   Pulse 68   Temp 97.5 °F (36.4 °C)   Resp 16   Ht 5' 5" (1.651 m)   Wt 83.5 kg (184 lb)   LMP 2019   SpO2 97%   Breastfeeding? No   BMI 30.62 kg/m²     FHT: 120, mod anjelica, + accels, - decels Cat 1 (reassuring)  CTX: q 2  SVE: 10/100/+2 @0730    ASSESSMENT:   39 y.o.  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 She underwent evaluation and was diagnosed with hemochromatosis and alpha 1 antitrypsin deficiency. She underwent a liver biopsy and was told it was fine. She was treated with phlebotomy started at once weekly. She last received phlebotomy in 2019. 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     Labor Course:   0020: 0.5/-4  0210: Cytotec x1  0730: 0.540/-4, cytotec x2  1200: Patient checked per Primary OBGYN 0.5/-4  1400: Cook Balloon Placed, Cytotec x 3  1830: 3/70/-2, cook in place, pitocin ordered to start @ 2 mU/min  1930: -3, cook balloon out   2230: -3, pitocin @ 12 mU  0030: /-2, pitocin @ 18 mU, AROM, clear  0330: -2, pitocin @ 26 mU  0730: 10/100/+2, pit @30    PLAN:  Continue Close Maternal/Fetal Monitoring  Recheck 2-4 hours or PRN    Naheed Reyes M.D.   OB/GYN  PGY-1    "

## 2019-12-13 NOTE — PROGRESS NOTES
"LABOR NOTE    S:  Complaints: No.  Epidural working:  not applicable  MD to bedside for routine cervical check    O: /68   Pulse 90   Temp 98.3 °F (36.8 °C)   Resp 18   Ht 5' 5" (1.651 m)   Wt 83.5 kg (184 lb)   LMP 2019   SpO2 95%   Breastfeeding? No   BMI 30.62 kg/m²     FHT: 140, mod anjelica, + accels, - decels Cat 1 (reassuring)  CTX: q 4-5  SVE: /-3 @ 1930, remains too high to AROM; pitocin @ 12 mU      ASSESSMENT:   39 y.o.  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 She underwent evaluation and was diagnosed with hemochromatosis and alpha 1 antitrypsin deficiency. She underwent a liver biopsy and was told it was fine. She was treated with phlebotomy started at once weekly. She last received phlebotomy in 2019. 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     Labor Course:   0020: 0.5-4  0210: Cytotec x1  0730: 0.5/-4, cytotec x2  1200: Patient checked per Primary OBGYN 0.5-4  1400: Cook Balloon Placed, Cytotec x 3  1830: 3/70/-2, cook in place, pitocin ordered to start @ 2 mU/min  1930: -3, cook balloon out   2230: -3, pitocin @ 12 mU    PLAN:  Continue Close Maternal/Fetal Monitoring  Recheck 4 hours or PRN  AROM when possible    Radha Geronimo MD, PhD  OBGYN, PGY-4   "

## 2019-12-13 NOTE — DISCHARGE SUMMARY
Delivery Discharge Summary  Obstetrics      Primary OB Clinician: Lluvia Bhakta MD      Admission date: 2019  Discharge date: 2019    Disposition: To home, self care    Discharge Diagnosis List:      Patient Active Problem List   Diagnosis    Hereditary hemochromatosis    Cervical high risk human papillomavirus (HPV) DNA test positive    Varicose veins of lower extremity during pregnancy in third trimester    Congenital anomaly of cardiovascular system     (spontaneous vaginal delivery)       Procedure:     Hospital Course:  Danielle Garcia is a 39 y.o. now , PPD #1 who was admitted on 2019 at 40w0d for IOL. Patient was subsequently admitted to labor and delivery unit with signed consents.     Labor course was uncomplicated and resulted in  without complications.     Please see delivery note for further details. Her postpartum course was uncomplicated. On discharge day, patient's pain is controlled with oral pain medications. Pt is tolerating ambulation without SOB or CP, and regular diet without N/V. Reports lochia is mild. Denies any HA, vision changes, F/C, LE swelling. Denies any breast pain/soreness.    Pt in stable condition and ready for discharge. She has been instructed to start and/or continue medications and follow up with her obstetrics provider as listed below.    Pertinent studies:  CBC  Recent Labs   Lab 19  0133   WBC 9.34   HGB 13.9   HCT 40.1   MCV 96             Immunization History   Administered Date(s) Administered    Influenza - Quadrivalent - PF (6 months and older) 10/08/2019    Tdap 10/08/2019        Delivery:    Episiotomy: None   Lacerations: 1st   Repair suture:     Repair # of packets: 1   Blood loss (ml):       Birth information:  YOB: 2020   Time of birth: 8:40 AM   Sex: male   Delivery type: Vaginal, Spontaneous   Gestational Age: 40w0d    Delivery Clinician:      Other providers:       Additional   information:  Forceps:    Vacuum:    Breech:    Observed anomalies      Living?:           APGARS  One minute Five minutes Ten minutes   Skin color:         Heart rate:         Grimace:         Muscle tone:         Breathing:         Totals: 9  9        Placenta: Delivered:       appearance      Patient Instructions:   Current Discharge Medication List      START taking these medications    Details   ibuprofen (ADVIL,MOTRIN) 600 MG tablet Take 1 tablet (600 mg total) by mouth every 6 (six) hours as needed for Pain.  Qty: 30 tablet, Refills: 1         CONTINUE these medications which have NOT CHANGED    Details   buPROPion (WELLBUTRIN XL) 150 MG TB24 tablet TAKE 1 TABLET(150 MG) BY MOUTH EVERY MORNING  Qty: 30 tablet, Refills: 0      dextroamphetamine-amphetamine (ADDERALL XR) 10 MG 24 hr capsule Take 1 capsule (10 mg total) by mouth once daily.  Qty: 30 capsule, Refills: 0    Associated Diagnoses: Attention deficit hyperactivity disorder (ADHD), unspecified ADHD type      pantoprazole (PROTONIX) 20 MG tablet Take 1 tablet (20 mg total) by mouth once daily.  Qty: 30 tablet, Refills: 11      prenatal vit/iron fum/folic ac (PRENATAL 1+1 ORAL) Take by mouth.    Associated Diagnoses: Positive pregnancy test; Elderly primigravida in first trimester      valACYclovir (VALTREX) 500 MG tablet TK 2 TABLETS PO Q 8 H TILL FEVER BLISTER ARE GONE. GF VALTREX  Refills: 0             Discharge Procedure Orders   Diet Adult Regular     Other restrictions (specify):   Order Comments: Pelvic rest- nothing in the vagina for 6 weeks (no sex, douching, tampons, etc).   No driving until not taking narcotics, or until you feel as though you can safely slam on the brakes without pain  No baths for 6 weeks, only showers     Notify your health care provider if you experience any of the following:  temperature >100.4     Notify your health care provider if you experience any of the following:  persistent nausea and vomiting or diarrhea      Notify your health care provider if you experience any of the following:  severe uncontrolled pain     Notify your health care provider if you experience any of the following:  redness, tenderness, or signs of infection (pain, swelling, redness, odor or green/yellow discharge around incision site)     Notify your health care provider if you experience any of the following:  difficulty breathing or increased cough     Notify your health care provider if you experience any of the following:  severe persistent headache     Notify your health care provider if you experience any of the following:  worsening rash     Notify your health care provider if you experience any of the following:  persistent dizziness, light-headedness, or visual disturbances     Notify your health care provider if you experience any of the following:  increased confusion or weakness     Notify your health care provider if you experience any of the following:   Order Comments: Heavy vaginal bleeding saturating more than 1 pad per hour for at least 2 hours.     Activity as tolerated       Follow-up Information     Lluvia Bhakta MD In 1 week.    Specialties:  Obstetrics, Obstetrics and Gynecology  Why:  For routine mood check in postpartum  Contact information:  [address] [address]  [phone]             Lluvia Bhakta MD. Schedule an appointment as soon as possible for a visit in 6 weeks.    Specialties:  Obstetrics, Obstetrics and Gynecology  Why:  Post-partum visit  Contact information:  [address] [id number] [phone]                    Naheed Reyes M.D.   OB/GYN  PGY-1

## 2019-12-13 NOTE — ANESTHESIA PROCEDURE NOTES
Epidural    Patient location during procedure: OB   Reason for block: primary anesthetic   Diagnosis: IUP   Start time: 12/13/2019 12:17 PM  Timeout: 12/13/2019 12:12 PM  End time: 12/13/2019 12:20 PM  Surgery related to: Vaginal Delivery    Staffing  Performing Provider: Felix Condon MD  Authorizing Provider: Janeth Bolaños MD        Preanesthetic Checklist  Completed: patient identified, site marked, surgical consent, pre-op evaluation, timeout performed, IV checked, risks and benefits discussed, monitors and equipment checked, anesthesia consent given, hand hygiene performed and patient being monitored  Preparation  Patient position: sitting  Prep: ChloraPrep  Patient monitoring: Pulse Ox  Epidural  Skin Anesthetic: lidocaine 1%  Skin Wheal: 3 mL  Administration type: continuous  Approach: midline  Interspace: L3-4    Injection technique: SHAWNA air  Needle and Epidural Catheter  Needle type: Tuohy   Needle gauge: 17  Needle length: 3.5 inches  Needle insertion depth: 5 cm  Catheter type: springwound  Catheter size: 19 G  Catheter at skin depth: 10 cm  Test dose: 3 mL of lidocaine 1.5% with Epi 1-to-200,000  Additional Documentation: incremental injection, negative aspiration for heme and CSF, no paresthesia on injection, no signs/symptoms of IV or SA injection, no significant pain on injection and no significant complaints from patient  Needle localization: anatomical landmarks  Medications:  Volume per aspiration: 5 mL  Time between aspirations: 5 minutes  Assessment  Ease of block: easy  Patient's tolerance of the procedure: comfortable throughout block and no complaintsNo inadvertent dural puncture with Tuohy.  Dural puncture not performed with spinal needle

## 2019-12-13 NOTE — DISCHARGE INSTRUCTIONS
Breastfeeding Discharge Instructions       Feed the baby at the earliest sign of hunger or comfort  o Hands to mouth, sucking motions  o Rooting or searching for something to suck on  o Dont wait for crying - it is a sign of distress     The feedings may be 8-12 times per 24hrs and will not follow a schedule   Avoid pacifiers and bottles for the first 4 weeks   Alternate the breast you start the feeding with, or start with the breast that feels the fullest   Switch breasts when the baby takes himself off the breast or falls asleep   Keep offering breasts until the baby looks full, no longer gives hunger signs, and stays asleep when placed on his back in the crib   If the baby is sleepy and wont wake for a feeding, put the baby skin-to-skin dressed in a diaper against the mothers bare chest   Sleep near your baby   The baby should be positioned and latched on to the breast correctly  o Chest-to-chest, chin in the breast  o Babys lips are flipped outward  o Babys mouth is stretched open wide like a shout  o Babys sucking should feel like tugging to the mother  - The baby should be drinking at the breast:  o You should hear swallowing or gulping throughout the feeding  o You should see milk on the babys lips when he comes off the breast  o Your breasts should be softer when the baby is finished feeding  o The baby should look relaxed at the end of feedings  o After the 4th day and your milk is in:  o The babys poop should turn bright yellow and be loose, watery, and seedy  o The baby should have at least 3-4 poops the size of the palm of your hand per day  o The baby should have at least 5-6 wet diapers per day  o The urine should be light yellow in color  You should drink when you are thirsty and eat a healthy diet when you are    hungry.     Take naps to get the rest you need.   Take medications and/or drink alcohol only with permission of your obstetrician    or the babys pediatrician.  You can  also call the [facility],   ([phone]), Monday-Friday, 8am-5pm Central time, to get the most   up-to-date evidence-based information on the use of medications during   pregnancy and breastfeeding.      The baby should be examined by a pediatrician at 3-5 days of age.  Once your   milk comes in, the baby should be gaining at least ½ - 1oz each day and should be back to birthweight no later than 10-14 days of age.          Community Resources    [facility] Breastfeeding Warmline: [phone]   Local [facility] clinics: provide incentives and breastpumps to eligible mothers  [employer] ([employer]):  mother-to-mother support group website        www.[place].MadBid.com  Local La Leche League mother-to-mother support groups:        www.Hashtrack        La Leche League [facility]   Dr. Devante Thomas website for latch videos and general information:        www.breastfeedinginc.ca  [facility] is a call center that provides information about the safety of taking medications while breastfeeding.  Call 1-405.763.2773, M-F, 8am-5pm, CT.  International Lactation Consultant Association provides resources for assistance:        www.ilca.org  [place] Breastfeeding [employer] provides informationand resources for parents  and the community    www.LaBreastfeedingSupport.org     Shelbi Tristan is a mom-to-mom support group:                             www.[email or web address]//breastfeedng-support/  [employer] for Healthy Babies:  2-378-202-BABY(6674)  [employer]: a breastfeeding support group for women of color, [phone]

## 2019-12-14 VITALS
HEIGHT: 65 IN | WEIGHT: 184 LBS | BODY MASS INDEX: 30.66 KG/M2 | TEMPERATURE: 99 F | HEART RATE: 80 BPM | OXYGEN SATURATION: 96 % | SYSTOLIC BLOOD PRESSURE: 129 MMHG | RESPIRATION RATE: 18 BRPM | DIASTOLIC BLOOD PRESSURE: 66 MMHG

## 2019-12-14 LAB
BASOPHILS # BLD AUTO: 0.03 K/UL (ref 0–0.2)
BASOPHILS NFR BLD: 0.2 % (ref 0–1.9)
DIFFERENTIAL METHOD: ABNORMAL
EOSINOPHIL # BLD AUTO: 0.2 K/UL (ref 0–0.5)
EOSINOPHIL NFR BLD: 1.2 % (ref 0–8)
ERYTHROCYTE [DISTWIDTH] IN BLOOD BY AUTOMATED COUNT: 12.8 % (ref 11.5–14.5)
HCT VFR BLD AUTO: 34.5 % (ref 37–48.5)
HGB BLD-MCNC: 11.7 G/DL (ref 12–16)
IMM GRANULOCYTES # BLD AUTO: 0.09 K/UL (ref 0–0.04)
IMM GRANULOCYTES NFR BLD AUTO: 0.7 % (ref 0–0.5)
LYMPHOCYTES # BLD AUTO: 1.8 K/UL (ref 1–4.8)
LYMPHOCYTES NFR BLD: 15.1 % (ref 18–48)
MCH RBC QN AUTO: 33.3 PG (ref 27–31)
MCHC RBC AUTO-ENTMCNC: 33.9 G/DL (ref 32–36)
MCV RBC AUTO: 98 FL (ref 82–98)
MONOCYTES # BLD AUTO: 0.7 K/UL (ref 0.3–1)
MONOCYTES NFR BLD: 5.5 % (ref 4–15)
NEUTROPHILS # BLD AUTO: 9.4 K/UL (ref 1.8–7.7)
NEUTROPHILS NFR BLD: 77.3 % (ref 38–73)
NRBC BLD-RTO: 0 /100 WBC
PLATELET # BLD AUTO: 160 K/UL (ref 150–350)
PMV BLD AUTO: 12 FL (ref 9.2–12.9)
RBC # BLD AUTO: 3.51 M/UL (ref 4–5.4)
WBC # BLD AUTO: 12.17 K/UL (ref 3.9–12.7)

## 2019-12-14 PROCEDURE — 85025 COMPLETE CBC W/AUTO DIFF WBC: CPT

## 2019-12-14 PROCEDURE — 36415 COLL VENOUS BLD VENIPUNCTURE: CPT

## 2019-12-14 PROCEDURE — 25000003 PHARM REV CODE 250: Performed by: STUDENT IN AN ORGANIZED HEALTH CARE EDUCATION/TRAINING PROGRAM

## 2019-12-14 PROCEDURE — 11000001 HC ACUTE MED/SURG PRIVATE ROOM

## 2019-12-14 RX ADMIN — IBUPROFEN 600 MG: 600 TABLET, FILM COATED ORAL at 03:12

## 2019-12-14 RX ADMIN — DOCUSATE SODIUM 200 MG: 100 CAPSULE, LIQUID FILLED ORAL at 08:12

## 2019-12-14 RX ADMIN — IBUPROFEN 600 MG: 600 TABLET, FILM COATED ORAL at 08:12

## 2019-12-14 RX ADMIN — HYDROCODONE BITARTRATE AND ACETAMINOPHEN 1 TABLET: 5; 325 TABLET ORAL at 09:12

## 2019-12-14 RX ADMIN — BUPROPION HYDROCHLORIDE 150 MG: 150 TABLET, FILM COATED, EXTENDED RELEASE ORAL at 08:12

## 2019-12-14 RX ADMIN — IBUPROFEN 600 MG: 600 TABLET, FILM COATED ORAL at 01:12

## 2019-12-14 RX ADMIN — PRENATAL VIT W/ FE FUMARATE-FA TAB 27-0.8 MG 1 TABLET: 27-0.8 TAB at 08:12

## 2019-12-14 NOTE — ANESTHESIA POSTPROCEDURE EVALUATION
Anesthesia Post Evaluation    Patient: Danielle Garcia    Procedure(s) Performed: * No procedures listed *    Final Anesthesia Type: epidural    Patient location during evaluation: floor  Patient participation: Yes- Able to Participate  Level of consciousness: awake and alert and oriented  Post-procedure vital signs: reviewed and stable  Pain management: adequate  Airway patency: patent    PONV status at discharge: No PONV  Anesthetic complications: no      Cardiovascular status: blood pressure returned to baseline and hemodynamically stable  Respiratory status: unassisted, spontaneous ventilation and room air  Hydration status: euvolemic  Follow-up not needed.          Vitals Value Taken Time   /74 12/14/2019  8:07 AM   Temp 37 °C (98.6 °F) 12/14/2019  8:07 AM   Pulse 76 12/14/2019  8:07 AM   Resp 18 12/14/2019  8:07 AM   SpO2 95 % 12/14/2019  8:07 AM         No case tracking events are documented in the log.      Pain/Keyla Score: Pain Rating Prior to Med Admin: 5 (12/14/2019  9:10 AM)  Pain Rating Post Med Admin: 2 (12/14/2019  9:50 AM)

## 2019-12-15 PROCEDURE — 25000003 PHARM REV CODE 250: Performed by: STUDENT IN AN ORGANIZED HEALTH CARE EDUCATION/TRAINING PROGRAM

## 2019-12-15 RX ADMIN — DOCUSATE SODIUM 200 MG: 100 CAPSULE, LIQUID FILLED ORAL at 08:12

## 2019-12-15 RX ADMIN — IBUPROFEN 600 MG: 600 TABLET, FILM COATED ORAL at 08:12

## 2019-12-15 RX ADMIN — BUPROPION HYDROCHLORIDE 150 MG: 150 TABLET, FILM COATED, EXTENDED RELEASE ORAL at 08:12

## 2019-12-15 RX ADMIN — PRENATAL VIT W/ FE FUMARATE-FA TAB 27-0.8 MG 1 TABLET: 27-0.8 TAB at 08:12

## 2019-12-15 RX ADMIN — IBUPROFEN 600 MG: 600 TABLET, FILM COATED ORAL at 02:12

## 2019-12-15 NOTE — PROGRESS NOTES
POSTPARTUM PROGRESS NOTE     Danielle Garcia is a 39 y.o. female PPD #2 status post Spontaneous vaginal delivery at 40w0d in a pregnancy complicated by hemochromatosis, anxiety, and AMA. Patient is doing well this morning. She denies nausea, vomiting, fever or chills.  Patient reports mild abdominal pain that is adequately relieved by oral pain medications. Lochia is mild and stable. Patient is voiding without difficulty and ambulating with no difficulty. She has passed flatus.  Patient does plan to breast feed. Per primary OB for contraception. She desires circumcision.     Objective:       Temp:  [98.3 °F (36.8 °C)-98.5 °F (36.9 °C)] 98.5 °F (36.9 °C)  Pulse:  [76-80] 80  Resp:  [18] 18  SpO2:  [95 %-96 %] 96 %  BP: (113-129)/(66-67) 129/66    General:   alert, appears stated age and cooperative   Lungs:   Non-labored respirations    Heart:   regular rate and rhythm   Abdomen:  Soft, nondistended    Uterus:  firm located at the umblicus.    Extremities: no pedal edema noted     Lab Review  Recent Labs   Lab 19  0133 19  0329   WBC 9.34 12.17   HGB 13.9 11.7*   HCT 40.1 34.5*   MCV 96 98    160       Assessment:     Patient Active Problem List   Diagnosis    Hereditary hemochromatosis    Cervical high risk human papillomavirus (HPV) DNA test positive    Varicose veins of lower extremity during pregnancy in third trimester    Congenital anomaly of cardiovascular system     (spontaneous vaginal delivery)     Plan:   1. Postpartum care:  - Patient doing well. Continue routine management and advances.  - Continue PO pain meds. Pain well controlled.  - Heme: H/h  >  - Encourage ambulation  - Circumcision desired- consents signed and to chart - to be done today  - Contraception  to be discussed at 6 week pp visit  - Lactation consult PRN    2. Hemochromatosis:   - has been treated with phlebotomy previously,   - Admission       3. Anxiety:   - continue home wellbutrin   -  restart adderall post-partum  - benadryl nightly PRN for insomnia     Dispo: As patient has met all milestones, will plan to discharge today.    Junaid Owusu M.D.  Obstetrics & Gynecology  PGY-3

## 2019-12-15 NOTE — DISCHARGE SUMMARY
Delivery Discharge Summary  Obstetrics      Primary OB Clinician: Lluvia Bhakta MD      Admission date: 2019  Discharge date: 12/15/2019    Disposition: To home, self care    Discharge Diagnosis List:      Patient Active Problem List   Diagnosis    Hereditary hemochromatosis    Cervical high risk human papillomavirus (HPV) DNA test positive    Varicose veins of lower extremity during pregnancy in third trimester    Congenital anomaly of cardiovascular system     (spontaneous vaginal delivery)       Procedure:     Hospital Course:  Danielle Garcia is a 39 y.o. now , PPD #2 who was admitted on 2019 at 40w0d for IOL. Patient was subsequently admitted to labor and delivery unit with signed consents.   Labor course was uncomplicated and resulted in  without complications.   Please see delivery note for further details. Her postpartum course was uncomplicated. On discharge day, patient's pain is controlled with oral pain medications. Pt is tolerating ambulation without SOB or CP, and regular diet without N/V. Reports lochia is mild. Denies any HA, vision changes, F/C, LE swelling. Denies any breast pain/soreness.  Pt in stable condition and ready for discharge. She has been instructed to start and/or continue medications and follow up with her obstetrics provider as listed below.    Pertinent studies:  CBC  Recent Labs   Lab 19  0133 19  0329   WBC 9.34 12.17   HGB 13.9 11.7*   HCT 40.1 34.5*   MCV 96 98    160      Immunization History   Administered Date(s) Administered    Influenza - Quadrivalent - PF (6 months and older) 10/08/2019    Tdap 10/08/2019        Delivery:    Episiotomy: None   Lacerations: 1st   Repair suture:     Repair # of packets: 1   Blood loss (ml):       Birth information:  YOB: 2020   Time of birth: 8:40 AM   Sex: male   Delivery type: Vaginal, Spontaneous   Gestational Age: 40w0d    Delivery Clinician:      Other  providers:       Additional  information:  Forceps:    Vacuum:    Breech:    Observed anomalies      Living?:           APGARS  One minute Five minutes Ten minutes   Skin color:         Heart rate:         Grimace:         Muscle tone:         Breathing:         Totals: 9  9        Placenta: Delivered:       appearance      Patient Instructions:   Current Discharge Medication List      START taking these medications    Details   ibuprofen (ADVIL,MOTRIN) 600 MG tablet Take 1 tablet (600 mg total) by mouth every 6 (six) hours as needed for Pain.  Qty: 30 tablet, Refills: 1         CONTINUE these medications which have NOT CHANGED    Details   buPROPion (WELLBUTRIN XL) 150 MG TB24 tablet TAKE 1 TABLET(150 MG) BY MOUTH EVERY MORNING  Qty: 30 tablet, Refills: 0      dextroamphetamine-amphetamine (ADDERALL XR) 10 MG 24 hr capsule Take 1 capsule (10 mg total) by mouth once daily.  Qty: 30 capsule, Refills: 0    Associated Diagnoses: Attention deficit hyperactivity disorder (ADHD), unspecified ADHD type      pantoprazole (PROTONIX) 20 MG tablet Take 1 tablet (20 mg total) by mouth once daily.  Qty: 30 tablet, Refills: 11      prenatal vit/iron fum/folic ac (PRENATAL 1+1 ORAL) Take by mouth.    Associated Diagnoses: Positive pregnancy test; Elderly primigravida in first trimester      valACYclovir (VALTREX) 500 MG tablet TK 2 TABLETS PO Q 8 H TILL FEVER BLISTER ARE GONE. GF VALTREX  Refills: 0             Discharge Procedure Orders   Diet Adult Regular     Other restrictions (specify):   Order Comments: Pelvic rest- nothing in the vagina for 6 weeks (no sex, douching, tampons, etc).   No driving until not taking narcotics, or until you feel as though you can safely slam on the brakes without pain  No baths for 6 weeks, only showers     Notify your health care provider if you experience any of the following:  temperature >100.4     Notify your health care provider if you experience any of the following:  persistent nausea and  vomiting or diarrhea     Notify your health care provider if you experience any of the following:  severe uncontrolled pain     Notify your health care provider if you experience any of the following:  redness, tenderness, or signs of infection (pain, swelling, redness, odor or green/yellow discharge around incision site)     Notify your health care provider if you experience any of the following:  difficulty breathing or increased cough     Notify your health care provider if you experience any of the following:  severe persistent headache     Notify your health care provider if you experience any of the following:  worsening rash     Notify your health care provider if you experience any of the following:  persistent dizziness, light-headedness, or visual disturbances     Notify your health care provider if you experience any of the following:  increased confusion or weakness     Notify your health care provider if you experience any of the following:   Order Comments: Heavy vaginal bleeding saturating more than 1 pad per hour for at least 2 hours.     Activity as tolerated       Follow-up Information     Lluvia Bhakta MD In 1 week.    Specialties:  Obstetrics, Obstetrics and Gynecology  Why:  For routine mood check in postpartum  Contact information:  [address] [address]  [phone]             Lluvia Bhakta MD. Schedule an appointment as soon as possible for a visit in 6 weeks.    Specialties:  Obstetrics, Obstetrics and Gynecology  Why:  Post-partum visit  Contact information:  [address] [id number] [phone]                Junaid Owusu M.D.  Obstetrics & Gynecology  PGY-3

## 2019-12-16 ENCOUNTER — PATIENT MESSAGE (OUTPATIENT)
Dept: OBSTETRICS AND GYNECOLOGY | Facility: CLINIC | Age: 39
End: 2019-12-16

## 2019-12-22 ENCOUNTER — PATIENT MESSAGE (OUTPATIENT)
Dept: OBSTETRICS AND GYNECOLOGY | Facility: CLINIC | Age: 39
End: 2019-12-22

## 2019-12-24 ENCOUNTER — PATIENT MESSAGE (OUTPATIENT)
Dept: OBSTETRICS AND GYNECOLOGY | Facility: CLINIC | Age: 39
End: 2019-12-24

## 2019-12-24 RX ORDER — HYDROCORTISONE ACETATE PRAMOXINE HCL 1; 1 G/100G; G/100G
CREAM TOPICAL 3 TIMES DAILY
Qty: 28.4 G | Refills: 1 | Status: SHIPPED | OUTPATIENT
Start: 2019-12-24 | End: 2020-08-14

## 2019-12-26 DIAGNOSIS — F90.9 ATTENTION DEFICIT HYPERACTIVITY DISORDER (ADHD), UNSPECIFIED ADHD TYPE: ICD-10-CM

## 2019-12-27 ENCOUNTER — TELEPHONE (OUTPATIENT)
Dept: OBSTETRICS AND GYNECOLOGY | Facility: OTHER | Age: 39
End: 2019-12-27

## 2019-12-29 ENCOUNTER — PATIENT MESSAGE (OUTPATIENT)
Dept: OBSTETRICS AND GYNECOLOGY | Facility: CLINIC | Age: 39
End: 2019-12-29

## 2019-12-30 RX ORDER — BUPROPION HYDROCHLORIDE 150 MG/1
150 TABLET ORAL DAILY
Qty: 30 TABLET | Refills: 0 | Status: SHIPPED | OUTPATIENT
Start: 2019-12-30 | End: 2020-02-01

## 2019-12-30 RX ORDER — DEXTROAMPHETAMINE SACCHARATE, AMPHETAMINE ASPARTATE MONOHYDRATE, DEXTROAMPHETAMINE SULFATE AND AMPHETAMINE SULFATE 2.5; 2.5; 2.5; 2.5 MG/1; MG/1; MG/1; MG/1
10 CAPSULE, EXTENDED RELEASE ORAL DAILY
Qty: 30 CAPSULE | Refills: 0 | Status: SHIPPED | OUTPATIENT
Start: 2019-12-30 | End: 2021-04-04 | Stop reason: SDUPTHER

## 2020-01-22 ENCOUNTER — POSTPARTUM VISIT (OUTPATIENT)
Dept: OBSTETRICS AND GYNECOLOGY | Facility: CLINIC | Age: 40
End: 2020-01-22
Payer: COMMERCIAL

## 2020-01-22 ENCOUNTER — LAB VISIT (OUTPATIENT)
Dept: LAB | Facility: OTHER | Age: 40
End: 2020-01-22
Attending: INTERNAL MEDICINE
Payer: COMMERCIAL

## 2020-01-22 ENCOUNTER — PATIENT MESSAGE (OUTPATIENT)
Dept: HEPATOLOGY | Facility: CLINIC | Age: 40
End: 2020-01-22

## 2020-01-22 ENCOUNTER — PATIENT MESSAGE (OUTPATIENT)
Dept: OBSTETRICS AND GYNECOLOGY | Facility: CLINIC | Age: 40
End: 2020-01-22

## 2020-01-22 VITALS
DIASTOLIC BLOOD PRESSURE: 66 MMHG | WEIGHT: 157.44 LBS | HEIGHT: 65 IN | BODY MASS INDEX: 26.23 KG/M2 | SYSTOLIC BLOOD PRESSURE: 98 MMHG

## 2020-01-22 DIAGNOSIS — D50.9 IRON DEFICIENCY ANEMIA, UNSPECIFIED IRON DEFICIENCY ANEMIA TYPE: ICD-10-CM

## 2020-01-22 DIAGNOSIS — Z86.19 HISTORY OF HPV INFECTION: ICD-10-CM

## 2020-01-22 LAB
BASOPHILS # BLD AUTO: 0.03 K/UL (ref 0–0.2)
BASOPHILS NFR BLD: 0.7 % (ref 0–1.9)
DIFFERENTIAL METHOD: ABNORMAL
EOSINOPHIL # BLD AUTO: 0.1 K/UL (ref 0–0.5)
EOSINOPHIL NFR BLD: 2.2 % (ref 0–8)
ERYTHROCYTE [DISTWIDTH] IN BLOOD BY AUTOMATED COUNT: 12.2 % (ref 11.5–14.5)
FERRITIN SERPL-MCNC: 60 NG/ML (ref 20–300)
HCT VFR BLD AUTO: 45.7 % (ref 37–48.5)
HGB BLD-MCNC: 15.4 G/DL (ref 12–16)
IMM GRANULOCYTES # BLD AUTO: 0.01 K/UL (ref 0–0.04)
IMM GRANULOCYTES NFR BLD AUTO: 0.2 % (ref 0–0.5)
IRON SERPL-MCNC: 203 UG/DL (ref 30–160)
LYMPHOCYTES # BLD AUTO: 1.6 K/UL (ref 1–4.8)
LYMPHOCYTES NFR BLD: 35.7 % (ref 18–48)
MCH RBC QN AUTO: 33.1 PG (ref 27–31)
MCHC RBC AUTO-ENTMCNC: 33.7 G/DL (ref 32–36)
MCV RBC AUTO: 98 FL (ref 82–98)
MONOCYTES # BLD AUTO: 0.4 K/UL (ref 0.3–1)
MONOCYTES NFR BLD: 8.6 % (ref 4–15)
NEUTROPHILS # BLD AUTO: 2.4 K/UL (ref 1.8–7.7)
NEUTROPHILS NFR BLD: 52.6 % (ref 38–73)
NRBC BLD-RTO: 0 /100 WBC
PLATELET # BLD AUTO: 291 K/UL (ref 150–350)
PMV BLD AUTO: 9.6 FL (ref 9.2–12.9)
RBC # BLD AUTO: 4.65 M/UL (ref 4–5.4)
SATURATED IRON: 76 % (ref 20–50)
TOTAL IRON BINDING CAPACITY: 266 UG/DL (ref 250–450)
TRANSFERRIN SERPL-MCNC: 180 MG/DL (ref 200–375)
WBC # BLD AUTO: 4.56 K/UL (ref 3.9–12.7)

## 2020-01-22 PROCEDURE — 99999 PR PBB SHADOW E&M-EST. PATIENT-LVL III: ICD-10-PCS | Mod: PBBFAC,,, | Performed by: OBSTETRICS & GYNECOLOGY

## 2020-01-22 PROCEDURE — 82728 ASSAY OF FERRITIN: CPT

## 2020-01-22 PROCEDURE — 88175 CYTOPATH C/V AUTO FLUID REDO: CPT

## 2020-01-22 PROCEDURE — 87624 HPV HI-RISK TYP POOLED RSLT: CPT

## 2020-01-22 PROCEDURE — 0503F PR POSTPARTUM CARE VISIT: ICD-10-PCS | Mod: S$GLB,,, | Performed by: OBSTETRICS & GYNECOLOGY

## 2020-01-22 PROCEDURE — 99999 PR PBB SHADOW E&M-EST. PATIENT-LVL III: CPT | Mod: PBBFAC,,, | Performed by: OBSTETRICS & GYNECOLOGY

## 2020-01-22 PROCEDURE — 85025 COMPLETE CBC W/AUTO DIFF WBC: CPT

## 2020-01-22 PROCEDURE — 36415 COLL VENOUS BLD VENIPUNCTURE: CPT

## 2020-01-22 PROCEDURE — 0503F POSTPARTUM CARE VISIT: CPT | Mod: S$GLB,,, | Performed by: OBSTETRICS & GYNECOLOGY

## 2020-01-22 PROCEDURE — 83540 ASSAY OF IRON: CPT

## 2020-01-22 NOTE — PROGRESS NOTES
"Danielle Garcia is a 39 y.o. female  presents for a postpartum visit.  She is status post  5 weeks ago.  Her hospitalization was not complicated.  She is breastfeeding/SUPPLEMENTING.  She desires natural family planning (NFP) for contraception.  She does not postpartum depression.  2019  MALE, FIRST DEGREE REPAIR   PAP NORMAL, POS HPV NON-16,18 - COTESTING SUBMITTED TODAY  "AMA- SEQ SCR NL.  HEMOCHROMATOSIS, PREV RX'D PHLEBOTOMY. O POS   ITS A BOY, 32W3D 27TH%ILE, F/U PRN (LLP RESOLVED)  CBC, FE, TIBC, FERRITIN Q 3MO.  RALEIGH JAMES 2019  REF BRUNNER BATOOL REGAN III; RIGHT LE VARICOSE VEIN"    Past Medical History:   Diagnosis Date    Hemochromatosis      Past Surgical History:   Procedure Laterality Date    KNEE SURGERY Right 2018    VSD REPAIR      3yo ()     Review of patient's allergies indicates:  No Known Allergies    Current Outpatient Medications:     buPROPion (WELLBUTRIN XL) 150 MG TB24 tablet, Take 1 tablet (150 mg total) by mouth once daily., Disp: 30 tablet, Rfl: 0    dextroamphetamine-amphetamine (ADDERALL XR) 10 MG 24 hr capsule, Take 1 capsule (10 mg total) by mouth once daily., Disp: 30 capsule, Rfl: 0    ibuprofen (ADVIL,MOTRIN) 600 MG tablet, Take 1 tablet (600 mg total) by mouth every 6 (six) hours as needed for Pain., Disp: 30 tablet, Rfl: 1    pantoprazole (PROTONIX) 20 MG tablet, Take 1 tablet (20 mg total) by mouth once daily., Disp: 30 tablet, Rfl: 11    pramoxine-hydrocortisone (PROCTOCREAM-HC) 1-1 % rectal cream, Place rectally 3 (three) times daily., Disp: 28.4 g, Rfl: 1    prenatal vit/iron fum/folic ac (PRENATAL 1+1 ORAL), Take by mouth., Disp: , Rfl:     valACYclovir (VALTREX) 500 MG tablet, TK 2 TABLETS PO Q 8 H TILL FEVER BLISTER ARE GONE. GF VALTREX, Disp: , Rfl: 0      There were no vitals filed for this visit.    ABDOMEN: Soft. No tenderness or masses. No hepatosplenomegaly. No hernias.  BREASTS: exam deferred  PELVIC: " External female genitalia without lesions, well-healed.  Female hair distribution. Adequate perineal body without evident defect, Normal urethral meatus. Vagina moist and well rugated without lesions or discharge. Cervix pink without lesions, discharge or tenderness. Uterus is 4-6 week size, regular, mobile and nontender. Adnexa without masses or tenderness.    Assessment:  Normal postpartum exam    Plan:  Routine follow up.

## 2020-01-23 ENCOUNTER — TELEPHONE (OUTPATIENT)
Dept: HEPATOLOGY | Facility: CLINIC | Age: 40
End: 2020-01-23

## 2020-01-23 NOTE — TELEPHONE ENCOUNTER
Patient called on home/mobile number [phone]. No Answer.  Left message that I am calling from [facility] to schedule a follow up visit until Dr Back returns.  Please get in contact with Ludivina or Laly through [facility] or call us at [phone].  Will send My Chart message.

## 2020-01-24 LAB
HPV HR 12 DNA SPEC QL NAA+PROBE: NEGATIVE
HPV16 AG SPEC QL: NEGATIVE
HPV18 DNA SPEC QL NAA+PROBE: NEGATIVE

## 2020-02-01 RX ORDER — BUPROPION HYDROCHLORIDE 150 MG/1
TABLET ORAL
Qty: 30 TABLET | Refills: 0 | Status: SHIPPED | OUTPATIENT
Start: 2020-02-01 | End: 2020-03-01

## 2020-02-11 ENCOUNTER — PATIENT MESSAGE (OUTPATIENT)
Dept: OBSTETRICS AND GYNECOLOGY | Facility: CLINIC | Age: 40
End: 2020-02-11

## 2020-02-11 LAB
FINAL PATHOLOGIC DIAGNOSIS: NORMAL
Lab: NORMAL

## 2020-03-01 RX ORDER — BUPROPION HYDROCHLORIDE 150 MG/1
TABLET ORAL
Qty: 30 TABLET | Refills: 0 | Status: SHIPPED | OUTPATIENT
Start: 2020-03-01 | End: 2020-03-31

## 2020-03-31 RX ORDER — BUPROPION HYDROCHLORIDE 150 MG/1
TABLET ORAL
Qty: 30 TABLET | Refills: 0 | Status: SHIPPED | OUTPATIENT
Start: 2020-03-31 | End: 2021-01-04

## 2020-07-20 ENCOUNTER — OFFICE VISIT (OUTPATIENT)
Dept: OBSTETRICS AND GYNECOLOGY | Facility: CLINIC | Age: 40
End: 2020-07-20
Payer: COMMERCIAL

## 2020-07-20 ENCOUNTER — LAB VISIT (OUTPATIENT)
Dept: LAB | Facility: OTHER | Age: 40
End: 2020-07-20
Attending: OBSTETRICS & GYNECOLOGY
Payer: COMMERCIAL

## 2020-07-20 VITALS
DIASTOLIC BLOOD PRESSURE: 70 MMHG | BODY MASS INDEX: 24.24 KG/M2 | SYSTOLIC BLOOD PRESSURE: 116 MMHG | WEIGHT: 145.5 LBS | HEIGHT: 65 IN

## 2020-07-20 DIAGNOSIS — O36.80X0 PREGNANCY WITH INCONCLUSIVE FETAL VIABILITY, SINGLE OR UNSPECIFIED FETUS: ICD-10-CM

## 2020-07-20 DIAGNOSIS — Z01.419 VISIT FOR GYNECOLOGIC EXAMINATION: Primary | ICD-10-CM

## 2020-07-20 LAB
ABO + RH BLD: NORMAL
BASOPHILS # BLD AUTO: 0.02 K/UL (ref 0–0.2)
BASOPHILS NFR BLD: 0.3 % (ref 0–1.9)
BLD GP AB SCN CELLS X3 SERPL QL: NORMAL
DIFFERENTIAL METHOD: ABNORMAL
EOSINOPHIL # BLD AUTO: 0.1 K/UL (ref 0–0.5)
EOSINOPHIL NFR BLD: 1.7 % (ref 0–8)
ERYTHROCYTE [DISTWIDTH] IN BLOOD BY AUTOMATED COUNT: 12.4 % (ref 11.5–14.5)
HCT VFR BLD AUTO: 38.5 % (ref 37–48.5)
HGB BLD-MCNC: 13 G/DL (ref 12–16)
IMM GRANULOCYTES # BLD AUTO: 0.02 K/UL (ref 0–0.04)
IMM GRANULOCYTES NFR BLD AUTO: 0.3 % (ref 0–0.5)
LYMPHOCYTES # BLD AUTO: 1.7 K/UL (ref 1–4.8)
LYMPHOCYTES NFR BLD: 28.5 % (ref 18–48)
MCH RBC QN AUTO: 34.1 PG (ref 27–31)
MCHC RBC AUTO-ENTMCNC: 33.8 G/DL (ref 32–36)
MCV RBC AUTO: 101 FL (ref 82–98)
MONOCYTES # BLD AUTO: 0.4 K/UL (ref 0.3–1)
MONOCYTES NFR BLD: 6.9 % (ref 4–15)
NEUTROPHILS # BLD AUTO: 3.6 K/UL (ref 1.8–7.7)
NEUTROPHILS NFR BLD: 62.3 % (ref 38–73)
NRBC BLD-RTO: 0 /100 WBC
PLATELET # BLD AUTO: 338 K/UL (ref 150–350)
PMV BLD AUTO: 9.9 FL (ref 9.2–12.9)
RBC # BLD AUTO: 3.81 M/UL (ref 4–5.4)
WBC # BLD AUTO: 5.83 K/UL (ref 3.9–12.7)

## 2020-07-20 PROCEDURE — 86901 BLOOD TYPING SEROLOGIC RH(D): CPT

## 2020-07-20 PROCEDURE — 36415 COLL VENOUS BLD VENIPUNCTURE: CPT

## 2020-07-20 PROCEDURE — 86703 HIV-1/HIV-2 1 RESULT ANTBDY: CPT

## 2020-07-20 PROCEDURE — 99999 PR PBB SHADOW E&M-EST. PATIENT-LVL III: CPT | Mod: PBBFAC,,, | Performed by: OBSTETRICS & GYNECOLOGY

## 2020-07-20 PROCEDURE — 99214 PR OFFICE/OUTPT VISIT, EST, LEVL IV, 30-39 MIN: ICD-10-PCS | Mod: S$GLB,,, | Performed by: OBSTETRICS & GYNECOLOGY

## 2020-07-20 PROCEDURE — 99214 OFFICE O/P EST MOD 30 MIN: CPT | Mod: S$GLB,,, | Performed by: OBSTETRICS & GYNECOLOGY

## 2020-07-20 PROCEDURE — 85025 COMPLETE CBC W/AUTO DIFF WBC: CPT

## 2020-07-20 PROCEDURE — 86592 SYPHILIS TEST NON-TREP QUAL: CPT

## 2020-07-20 PROCEDURE — 99999 PR PBB SHADOW E&M-EST. PATIENT-LVL III: ICD-10-PCS | Mod: PBBFAC,,, | Performed by: OBSTETRICS & GYNECOLOGY

## 2020-07-20 PROCEDURE — 86762 RUBELLA ANTIBODY: CPT

## 2020-07-20 PROCEDURE — 87086 URINE CULTURE/COLONY COUNT: CPT

## 2020-07-20 PROCEDURE — 87491 CHLMYD TRACH DNA AMP PROBE: CPT

## 2020-07-20 PROCEDURE — 87340 HEPATITIS B SURFACE AG IA: CPT

## 2020-07-20 NOTE — PATIENT INSTRUCTIONS
I suggest you speak with [facility] to get an idea of the out of pocket cost (which can be several thousands of dollars).  Contact [facility] at [phone] option #2.  Speak with the billing department regarding the out of pocket expense to make sure it isn't exorbitant.  In my experience, if they give you a range of costs, it's better to count on the higher amount.  If you're okay with quote received from [facility] Billing Department, you may contact me to enter orders and to set up the blood draw; you can have this test drawn as soon as 11 weeks.

## 2020-07-20 NOTE — PROGRESS NOTES
"HISTORY OF PRESENT ILLNESS:    Danielle Garcia is a 39 y.o. female, , Patient's last menstrual period was 2020.,  presents for a problem visit, complaining of AMENORRHEA AND POS PREG TEST AT HOME.  BASED LMP, EDC IS 3/16/2021 AND   5W6D GESTATION.  REF LABS, WANTS NIPT - INFO GIVEN REGARDING COSTS OF TESTING.  REF DATING USG.  EXAM DONE AS AN ANNUAL  SHORT INTERVAL  AMA  HEMOCHROMATOSIS  SUSPENDING ADERRAL AND WELLBUTRIN IN T1, WILL F/U REG COUNSELING    LAST :  status post  5 weeks ago.  Her hospitalization was not complicated.  She is breastfeeding/SUPPLEMENTING.  She desires natural family planning (NFP) for contraception.  She does not postpartum depression.  2019  MALE, FIRST DEGREE REPAIR   PAP NORMAL, POS HPV NON-16,18 - COTESTING SUBMITTED TODAY  "AMA- SEQ SCR NL.  HEMOCHROMATOSIS, PREV RX'D PHLEBOTOMY. O POS   ITS A BOY, 32W3D 27TH%ILE, F/U PRN (LLP RESOLVED)  CBC, FE, TIBC, FERRITIN Q 3MO.  WELLJENNIFER CHOERRALL 2019  REF BRUNNER,  BATOOL REGAN III; RIGHT LE VARICOSE VEIN"    Past Medical History:   Diagnosis Date    Hemochromatosis        Past Surgical History:   Procedure Laterality Date    KNEE SURGERY Right 2018    VSD REPAIR      1yo ()       MEDICATIONS AND ALLERGIES:      Current Outpatient Medications:     buPROPion (WELLBUTRIN XL) 150 MG TB24 tablet, TAKE 1 TABLET(150 MG) BY MOUTH EVERY DAY, Disp: 30 tablet, Rfl: 0    dextroamphetamine-amphetamine (ADDERALL XR) 10 MG 24 hr capsule, Take 1 capsule (10 mg total) by mouth once daily., Disp: 30 capsule, Rfl: 0    prenatal vit/iron fum/folic ac (PRENATAL 1+1 ORAL), Take by mouth., Disp: , Rfl:     valACYclovir (VALTREX) 500 MG tablet, TK 2 TABLETS PO Q 8 H TILL FEVER BLISTER ARE GONE. GF VALTREX, Disp: , Rfl: 0    pramoxine-hydrocortisone (PROCTOCREAM-HC) 1-1 % rectal cream, Place rectally 3 (three) times daily., Disp: 28.4 g, Rfl: 1    Review of patient's allergies indicates:  No Known " Allergies    Family History   Problem Relation Age of Onset    Breast cancer Neg Hx     Colon cancer Neg Hx     Ovarian cancer Neg Hx        Social History     Socioeconomic History    Marital status:      Spouse name: Not on file    Number of children: Not on file    Years of education: Not on file    Highest education level: Not on file   Occupational History    Not on file   Social Needs    Financial resource strain: Not on file    Food insecurity     Worry: Not on file     Inability: Not on file    Transportation needs     Medical: Not on file     Non-medical: Not on file   Tobacco Use    Smoking status: Never Smoker    Smokeless tobacco: Never Used   Substance and Sexual Activity    Alcohol use: Not Currently    Drug use: Never    Sexual activity: Yes     Partners: Male     Birth control/protection: None   Lifestyle    Physical activity     Days per week: Not on file     Minutes per session: Not on file    Stress: Not on file   Relationships    Social connections     Talks on phone: Not on file     Gets together: Not on file     Attends Yazidi service: Not on file     Active member of club or organization: Not on file     Attends meetings of clubs or organizations: Not on file     Relationship status: Not on file   Other Topics Concern    Not on file   Social History Narrative    Not on file       COMPREHENSIVE GYN HISTORY:  PAP History: Denies abnormal Paps.  Infection History: Denies STDs. Denies PID.  Benign History: Denies uterine fibroids. Denies ovarian cysts. Denies endometriosis. Denies other conditions.  Cancer History: Denies cervical cancer. Denies uterine cancer or hyperplasia. Denies ovarian cancer. Denies vulvar cancer or pre-cancer. Denies vaginal cancer or pre-cancer. Denies breast cancer. Denies colon cancer.    ROS:  GENERAL: No weight changes. No swelling. No fatigue. No fever.  CARDIOVASCULAR: No chest pain. No shortness of breath. No leg cramps.  "  NEUROLOGICAL: No headaches. No vision changes.  BREASTS: No pain. No lumps. No discharge.  ABDOMEN: No pain. No nausea. No vomiting. No diarrhea. No constipation.  REPRODUCTIVE: No abnormal bleeding.   VULVA: No pain. No lesions. No itching.  VAGINA: No relaxation. No itching. No odor. No discharge. No lesions.  URINARY: No incontinence. No nocturia. No frequency. No dysuria.    /70   Ht 5' 5" (1.651 m)   Wt 66 kg (145 lb 8.1 oz)   LMP 06/09/2020   BMI 24.21 kg/m²     PE:  APPEARANCE: Well nourished, well developed, in no acute distress.  ABDOMEN: Soft. No tenderness or masses. No hepatosplenomegaly. No hernias.  BREASTS, NO MASS, SKIN CHANGE, NONTENDER BILAT  PELVIC: External female genitalia without lesions.  Female hair distribution. Adequate perineal body, Normal urethral meatus. Vagina moist and well rugated without lesions or discharge.  No significant cystocele or rectocele present. Cervix pink without lesions, discharge or tenderness. Uterus is 6W size, regular, mobile and nontender. Adnexa without masses or tenderness.  EXTREMITIES: No edema    PROCEDURES:    DIAGNOSIS:  1. Visit for gynecologic examination     2. Pregnancy with inconclusive fetal viability, single or unspecified fetus  CBC auto differential    Rubella Antibody, IgG    HIV 1/2 Ag/Ab (4th Gen)    RPR    Hepatitis B Surface Antigen    Urine culture    C. trachomatis/N. gonorrhoeae by AMP DNA    US OB/GYN Procedure ([facility])    CANCELED: Type & Screen - Ob Profile       LABS AND TESTS ORDERED:    MEDICATIONS PRESCRIBED:    COUNSELING:   The patient was counseled re anticipated course of prenatal care in pregnancy.  She was counseled regarding the use of prenatal vitamins that contain folate and iron.  She was advised regarding the avoidance of alcohol and caffeine during early pregnancy, plus the negative effects of smoking on fecundity and on the development of an early pregnancy.  She was advised to review her immunization " history and to update as necessary.  She was advised to review her family history for potential inherited diseases that would require  screening.        FOLLOW-UP with me routine visit.

## 2020-07-21 LAB
BACTERIA UR CULT: NORMAL
BACTERIA UR CULT: NORMAL
HBV SURFACE AG SERPL QL IA: NEGATIVE
HIV 1+2 AB+HIV1 P24 AG SERPL QL IA: NEGATIVE
RPR SER QL: NORMAL
RUBV IGG SER-ACNC: 45.5 IU/ML
RUBV IGG SER-IMP: REACTIVE

## 2020-07-24 LAB
C TRACH DNA SPEC QL NAA+PROBE: NOT DETECTED
N GONORRHOEA DNA SPEC QL NAA+PROBE: NOT DETECTED

## 2020-07-29 ENCOUNTER — TELEPHONE (OUTPATIENT)
Dept: OBSTETRICS AND GYNECOLOGY | Facility: CLINIC | Age: 40
End: 2020-07-29

## 2020-07-29 DIAGNOSIS — Z34.90 PREGNANCY, UNSPECIFIED GESTATIONAL AGE: ICD-10-CM

## 2020-07-29 DIAGNOSIS — O20.0 THREATENED ABORTION: Primary | ICD-10-CM

## 2020-07-29 NOTE — TELEPHONE ENCOUNTER
----- Message from Danielle Falcon sent at 7/29/2020 12:07 PM CDT -----  Regarding: pt  Type: Patient Call Back    Who called:pt    What is the request in detail:pt is requesting to speak to nurse. She states she just got phone with her and needs to speak to her again. Call pt    Can the clinic reply by [facility]?    Would the patient rather a call back or a response via [facility]? call    Best call back number:[phone] (home)       Additional Information:

## 2020-07-29 NOTE — TELEPHONE ENCOUNTER
Called patient back, patient C/O spotting no cramping, patient is out of town and is unable to come in the office today.   ER precautions were dicussed with patient - patient was advised to follow up with lab on tomorrow morning and then again on Saturday at the main campus location.. patient understands all.

## 2020-07-30 ENCOUNTER — PROCEDURE VISIT (OUTPATIENT)
Dept: OBSTETRICS AND GYNECOLOGY | Facility: CLINIC | Age: 40
End: 2020-07-30
Payer: COMMERCIAL

## 2020-07-30 ENCOUNTER — TELEPHONE (OUTPATIENT)
Dept: OBSTETRICS AND GYNECOLOGY | Facility: CLINIC | Age: 40
End: 2020-07-30

## 2020-07-30 ENCOUNTER — OFFICE VISIT (OUTPATIENT)
Dept: OBSTETRICS AND GYNECOLOGY | Facility: CLINIC | Age: 40
End: 2020-07-30
Payer: COMMERCIAL

## 2020-07-30 ENCOUNTER — LAB VISIT (OUTPATIENT)
Dept: LAB | Facility: OTHER | Age: 40
End: 2020-07-30
Attending: OBSTETRICS & GYNECOLOGY
Payer: COMMERCIAL

## 2020-07-30 VITALS
SYSTOLIC BLOOD PRESSURE: 112 MMHG | HEIGHT: 65 IN | WEIGHT: 145.5 LBS | BODY MASS INDEX: 24.24 KG/M2 | DIASTOLIC BLOOD PRESSURE: 70 MMHG

## 2020-07-30 DIAGNOSIS — O20.0 THREATENED ABORTION: ICD-10-CM

## 2020-07-30 DIAGNOSIS — Z36.3 ENCOUNTER FOR ANTENATAL SCREENING FOR MALFORMATION USING ULTRASOUND: ICD-10-CM

## 2020-07-30 DIAGNOSIS — O20.0 THREATENED ABORTION IN EARLY PREGNANCY: Primary | ICD-10-CM

## 2020-07-30 DIAGNOSIS — O36.80X0 PREGNANCY OF UNKNOWN ANATOMIC LOCATION: ICD-10-CM

## 2020-07-30 DIAGNOSIS — Z36.3 ENCOUNTER FOR ANTENATAL SCREENING FOR MALFORMATION USING ULTRASOUND: Primary | ICD-10-CM

## 2020-07-30 LAB — HCG INTACT+B SERPL-ACNC: 33 MIU/ML

## 2020-07-30 PROCEDURE — 99214 OFFICE O/P EST MOD 30 MIN: CPT | Mod: S$GLB,,, | Performed by: OBSTETRICS & GYNECOLOGY

## 2020-07-30 PROCEDURE — 99214 PR OFFICE/OUTPT VISIT, EST, LEVL IV, 30-39 MIN: ICD-10-PCS | Mod: S$GLB,,, | Performed by: OBSTETRICS & GYNECOLOGY

## 2020-07-30 PROCEDURE — 3008F BODY MASS INDEX DOCD: CPT | Mod: CPTII,S$GLB,, | Performed by: OBSTETRICS & GYNECOLOGY

## 2020-07-30 PROCEDURE — 84702 CHORIONIC GONADOTROPIN TEST: CPT

## 2020-07-30 PROCEDURE — 3008F PR BODY MASS INDEX (BMI) DOCUMENTED: ICD-10-PCS | Mod: CPTII,S$GLB,, | Performed by: OBSTETRICS & GYNECOLOGY

## 2020-07-30 PROCEDURE — 36415 COLL VENOUS BLD VENIPUNCTURE: CPT

## 2020-07-30 PROCEDURE — 99999 PR PBB SHADOW E&M-EST. PATIENT-LVL II: CPT | Mod: PBBFAC,,, | Performed by: OBSTETRICS & GYNECOLOGY

## 2020-07-30 PROCEDURE — 99999 PR PBB SHADOW E&M-EST. PATIENT-LVL II: ICD-10-PCS | Mod: PBBFAC,,, | Performed by: OBSTETRICS & GYNECOLOGY

## 2020-07-30 PROCEDURE — 76817 PR US, OB, TRANSVAG APPROACH: ICD-10-PCS | Mod: S$GLB,,, | Performed by: OBSTETRICS & GYNECOLOGY

## 2020-07-30 PROCEDURE — 76817 TRANSVAGINAL US OBSTETRIC: CPT | Mod: S$GLB,,, | Performed by: OBSTETRICS & GYNECOLOGY

## 2020-07-30 NOTE — TELEPHONE ENCOUNTER
S/w pt , she stated she is having some bleeding and is requesting to be seen today after her labs, appt scheduled for today with Dr. Mcbride

## 2020-07-30 NOTE — PROGRESS NOTES
07/30/2020 U/S  Gestational sac: not visualized.   Yolk sac: not visualized.   Amniotic sac: not visualized.   Embryo: not visualized.     Maternal Structures   ===============   Uterus / Cervix   Uterus: Normal   Uterus long 83 mm   Uterus ap 45 mm   Uterus tr 48 mm   Uterus Vol 94.3 cmÂ³   Endometrial thickness, total 3.3 mm   Ovaries / Tubes / Adnexa   Rt ovary: Normal   Rt ovary D1 28 mm   Rt ovary D2 29 mm   Rt ovary D3 21 mm   Rt ovary Vol 9.2 cmÂ³   Rt ovarian corpus luteum: visualized   Rt ovarian corpus luteum D1 21.0 mm   Rt ovarian corpus luteum D2 171.0 mm   Lt ovary: Normal   Lt ovary D1 28 mm   Lt ovary D2 25 mm   Lt ovary D3 14 mm   Lt ovary Vol 5.1 cmÂ³   Pouch of Klever / Other Structures   Free fluid: No free fluid visualized     Impression   =========   There is no evidence of a gestational sac within the uterus. No adnexal masses or significant free pelvic fluid are identified. These sonographic findings are consistent with a   diagnosis of a pregnancy of unknown location. Recommend further evaluation with serial quantitative HCG levels. Recommend f/u ultrasound examination, as deemed   clinically appropriate, in 2 - 3 weeks. If the patient becomes symptomatic with abdominal or pelvic pain, recommend repeat ultrasound examination sooner.     7/30/2020    hCG Quant 33       PT WAS IN [place] YESTERDAY WITH SPOTTING AND THEN BLEEDING. PASSED A CLOT YESTERDAY EVENING WITH BLEEDING. JUST SPOTTING TODAY.  NO REAL CRAMPS. WORRIED ABOUT PREGNANCY.       7/20/2020    Group & Rh O POS   INDIRECT BLU NEG     ROS:  GENERAL: No fever, chills, fatigability or weight loss.  VULVAR: No pain, no lesions and no itching.  VAGINAL: No relaxation, no itching, no discharge, no abnormal bleeding and no lesions.  ABDOMEN: No abdominal pain. Denies nausea. Denies vomiting. No diarrhea. No constipation  BREAST: Denies pain. No lumps. No discharge.  URINARY: No incontinence, no nocturia, no frequency and no  dysuria.  CARDIOVASCULAR: No chest pain. No shortness of breath. No leg cramps.  NEUROLOGICAL: No headaches. No vision changes.  The remainder of the review of systems was negative.    PE:  General Appearance: normal weight And Well developed. Well nourished. In no acute distress.  Vulva: Lesions: No.  Urethral Meatus: Normal size. Normal location. No lesions. No prolapse.  Urethra: No masses. No tenderness. No prolapse. No scarring.  Bladder: No masses. No tenderness.  Vagina: Mucosa NI:yes discharge no, atrophy no, cystocele no or rectocele no.  MIN BLOOD  Cervix: Lesion: no  Stenotic: no Cervical motion tenderness: no  Uterus: Uterus size: 6 weeks. Support good. Uterus size: Normal  Adnexa: Masses: No Tenderness: No CDS Nodularity: No  Abdomen: normal weight No masses. No tenderness.  CHEST: CTA B  HEART: RRR  EXT: NO EDEMA      PROCEDURES:    PLAN:     DIAGNOSIS:  1. Threatened  in early pregnancy        MEDICATIONS & ORDERS:     20 MIN D/W PT AND  ON SAB    FOLLOW-UP: With me AFTER SCAN

## 2020-08-02 ENCOUNTER — PATIENT MESSAGE (OUTPATIENT)
Dept: OBSTETRICS AND GYNECOLOGY | Facility: HOSPITAL | Age: 40
End: 2020-08-02

## 2020-08-03 NOTE — TELEPHONE ENCOUNTER
"I saw the picture you sent us, the ultrasound result, Dr Mcbride's note, and this hormone level - I'm so, so sorry.  The whole picture is consistent with a spontaneous miscarriage, which is more common over the age of 40 but still so devastating.  The most common causes would be genetic defects (1 in 4 pregnancies will suffer a loss due to genetic anomalies for patients your age); I don't think that recent travel or other activity had anything to do with it. And in these cases, there's nothing to do that will change the ultimate outcome . .      If bleeding and cramping have settled down, I don't think more blood testing is needed for follow up.  It's safe to try again to become pregnant in a month, although many patients find it easier emotionally to have 2-3 months "distance" between pregnancies.  If you are going to try again in the near future, please continue taking your prenatal vitamins.  And please let me know if you have any questions or worries as you recuperate.  "

## 2020-08-12 ENCOUNTER — LAB VISIT (OUTPATIENT)
Dept: LAB | Facility: OTHER | Age: 40
End: 2020-08-12
Attending: OBSTETRICS & GYNECOLOGY
Payer: COMMERCIAL

## 2020-08-12 DIAGNOSIS — Z34.90 PREGNANCY, UNSPECIFIED GESTATIONAL AGE: ICD-10-CM

## 2020-08-12 LAB — HCG INTACT+B SERPL-ACNC: <1.2 MIU/ML

## 2020-08-12 PROCEDURE — 36415 COLL VENOUS BLD VENIPUNCTURE: CPT

## 2020-08-12 PROCEDURE — 84702 CHORIONIC GONADOTROPIN TEST: CPT

## 2020-08-14 ENCOUNTER — OFFICE VISIT (OUTPATIENT)
Dept: OBSTETRICS AND GYNECOLOGY | Facility: CLINIC | Age: 40
End: 2020-08-14
Payer: COMMERCIAL

## 2020-08-14 VITALS
BODY MASS INDEX: 24.02 KG/M2 | DIASTOLIC BLOOD PRESSURE: 60 MMHG | WEIGHT: 144.19 LBS | SYSTOLIC BLOOD PRESSURE: 102 MMHG | HEIGHT: 65 IN

## 2020-08-14 DIAGNOSIS — O03.9 SAB (SPONTANEOUS ABORTION): Primary | ICD-10-CM

## 2020-08-14 PROCEDURE — 99999 PR PBB SHADOW E&M-EST. PATIENT-LVL III: CPT | Mod: PBBFAC,,, | Performed by: OBSTETRICS & GYNECOLOGY

## 2020-08-14 PROCEDURE — 99212 OFFICE O/P EST SF 10 MIN: CPT | Mod: S$GLB,,, | Performed by: OBSTETRICS & GYNECOLOGY

## 2020-08-14 PROCEDURE — 3008F PR BODY MASS INDEX (BMI) DOCUMENTED: ICD-10-PCS | Mod: CPTII,S$GLB,, | Performed by: OBSTETRICS & GYNECOLOGY

## 2020-08-14 PROCEDURE — 3008F BODY MASS INDEX DOCD: CPT | Mod: CPTII,S$GLB,, | Performed by: OBSTETRICS & GYNECOLOGY

## 2020-08-14 PROCEDURE — 99999 PR PBB SHADOW E&M-EST. PATIENT-LVL III: ICD-10-PCS | Mod: PBBFAC,,, | Performed by: OBSTETRICS & GYNECOLOGY

## 2020-08-14 PROCEDURE — 99212 PR OFFICE/OUTPT VISIT, EST, LEVL II, 10-19 MIN: ICD-10-PCS | Mod: S$GLB,,, | Performed by: OBSTETRICS & GYNECOLOGY

## 2020-08-14 RX ORDER — ZOLPIDEM TARTRATE 6.25 MG/1
6.25 TABLET, FILM COATED, EXTENDED RELEASE ORAL
COMMUNITY
Start: 2020-08-04 | End: 2021-01-04

## 2020-08-14 RX ORDER — ALPRAZOLAM 0.5 MG/1
0.5 TABLET ORAL
COMMUNITY
Start: 2020-08-04 | End: 2021-01-04

## 2020-08-14 NOTE — PROGRESS NOTES
PT HERE F/U S/P SAB.  BLEEDING STOPPED AND FEELS WELL.     2020    hCG Quant <1.2     ROS:  GENERAL: No fever, chills, fatigability or weight loss.  VULVAR: No pain, no lesions and no itching.  VAGINAL: No relaxation, no itching, no discharge, no abnormal bleeding and no lesions.  ABDOMEN: No abdominal pain. Denies nausea. Denies vomiting. No diarrhea. No constipation  BREAST: Denies pain. No lumps. No discharge.  URINARY: No incontinence, no nocturia, no frequency and no dysuria.  CARDIOVASCULAR: No chest pain. No shortness of breath. No leg cramps.  NEUROLOGICAL: No headaches. No vision changes.  The remainder of the review of systems was negative.    PE:  General Appearance: normal weight And Well developed. Well nourished. In no acute distress.    PROCEDURES:    PLAN:     DIAGNOSIS:  1. SAB (spontaneous )        MEDICATIONS & ORDERS:     10 MIN D/W PT S/P SAB    FOLLOW-UP: With me PRN

## 2020-12-06 ENCOUNTER — PATIENT MESSAGE (OUTPATIENT)
Dept: OBSTETRICS AND GYNECOLOGY | Facility: CLINIC | Age: 40
End: 2020-12-06

## 2020-12-06 DIAGNOSIS — O36.80X0 PREGNANCY WITH INCONCLUSIVE FETAL VIABILITY, SINGLE OR UNSPECIFIED FETUS: Primary | ICD-10-CM

## 2020-12-08 RX ORDER — PROGESTERONE 100 MG/1
100 CAPSULE ORAL DAILY
Qty: 30 CAPSULE | Refills: 2 | Status: SHIPPED | OUTPATIENT
Start: 2020-12-08 | End: 2021-04-28

## 2020-12-10 ENCOUNTER — PATIENT MESSAGE (OUTPATIENT)
Dept: OBSTETRICS AND GYNECOLOGY | Facility: CLINIC | Age: 40
End: 2020-12-10

## 2020-12-15 ENCOUNTER — PROCEDURE VISIT (OUTPATIENT)
Dept: OBSTETRICS AND GYNECOLOGY | Facility: CLINIC | Age: 40
End: 2020-12-15
Payer: COMMERCIAL

## 2020-12-15 ENCOUNTER — OFFICE VISIT (OUTPATIENT)
Dept: OBSTETRICS AND GYNECOLOGY | Facility: CLINIC | Age: 40
End: 2020-12-15
Payer: COMMERCIAL

## 2020-12-15 ENCOUNTER — PATIENT MESSAGE (OUTPATIENT)
Dept: OBSTETRICS AND GYNECOLOGY | Facility: CLINIC | Age: 40
End: 2020-12-15

## 2020-12-15 VITALS
SYSTOLIC BLOOD PRESSURE: 100 MMHG | BODY MASS INDEX: 24.18 KG/M2 | DIASTOLIC BLOOD PRESSURE: 78 MMHG | WEIGHT: 145.31 LBS

## 2020-12-15 DIAGNOSIS — Z34.90 PREGNANCY, UNSPECIFIED GESTATIONAL AGE: ICD-10-CM

## 2020-12-15 DIAGNOSIS — Z34.90 PREGNANCY, UNSPECIFIED GESTATIONAL AGE: Primary | ICD-10-CM

## 2020-12-15 LAB
ABO + RH BLD: NORMAL
B-HCG UR QL: POSITIVE
BASOPHILS # BLD AUTO: 0.03 K/UL (ref 0–0.2)
BASOPHILS NFR BLD: 0.4 % (ref 0–1.9)
BLD GP AB SCN CELLS X3 SERPL QL: NORMAL
CTP QC/QA: YES
DIFFERENTIAL METHOD: ABNORMAL
EOSINOPHIL # BLD AUTO: 0.1 K/UL (ref 0–0.5)
EOSINOPHIL NFR BLD: 0.7 % (ref 0–8)
ERYTHROCYTE [DISTWIDTH] IN BLOOD BY AUTOMATED COUNT: 12.4 % (ref 11.5–14.5)
HCT VFR BLD AUTO: 40.4 % (ref 37–48.5)
HGB BLD-MCNC: 13.7 G/DL (ref 12–16)
IMM GRANULOCYTES # BLD AUTO: 0.03 K/UL (ref 0–0.04)
IMM GRANULOCYTES NFR BLD AUTO: 0.4 % (ref 0–0.5)
LYMPHOCYTES # BLD AUTO: 1.8 K/UL (ref 1–4.8)
LYMPHOCYTES NFR BLD: 22.3 % (ref 18–48)
MCH RBC QN AUTO: 34.4 PG (ref 27–31)
MCHC RBC AUTO-ENTMCNC: 33.9 G/DL (ref 32–36)
MCV RBC AUTO: 102 FL (ref 82–98)
MONOCYTES # BLD AUTO: 0.4 K/UL (ref 0.3–1)
MONOCYTES NFR BLD: 4.8 % (ref 4–15)
NEUTROPHILS # BLD AUTO: 5.9 K/UL (ref 1.8–7.7)
NEUTROPHILS NFR BLD: 71.4 % (ref 38–73)
NRBC BLD-RTO: 0 /100 WBC
PLATELET # BLD AUTO: 305 K/UL (ref 150–350)
PMV BLD AUTO: 10.1 FL (ref 9.2–12.9)
RBC # BLD AUTO: 3.98 M/UL (ref 4–5.4)
WBC # BLD AUTO: 8.26 K/UL (ref 3.9–12.7)

## 2020-12-15 PROCEDURE — 3008F PR BODY MASS INDEX (BMI) DOCUMENTED: ICD-10-PCS | Mod: CPTII,S$GLB,, | Performed by: ADVANCED PRACTICE MIDWIFE

## 2020-12-15 PROCEDURE — 1126F AMNT PAIN NOTED NONE PRSNT: CPT | Mod: S$GLB,,, | Performed by: ADVANCED PRACTICE MIDWIFE

## 2020-12-15 PROCEDURE — 1126F PR PAIN SEVERITY QUANTIFIED, NO PAIN PRESENT: ICD-10-PCS | Mod: S$GLB,,, | Performed by: ADVANCED PRACTICE MIDWIFE

## 2020-12-15 PROCEDURE — 99999 PR PBB SHADOW E&M-EST. PATIENT-LVL III: ICD-10-PCS | Mod: PBBFAC,,, | Performed by: ADVANCED PRACTICE MIDWIFE

## 2020-12-15 PROCEDURE — 99999 PR PBB SHADOW E&M-EST. PATIENT-LVL III: CPT | Mod: PBBFAC,,, | Performed by: ADVANCED PRACTICE MIDWIFE

## 2020-12-15 PROCEDURE — 99213 PR OFFICE/OUTPT VISIT, EST, LEVL III, 20-29 MIN: ICD-10-PCS | Mod: S$GLB,,, | Performed by: ADVANCED PRACTICE MIDWIFE

## 2020-12-15 PROCEDURE — 83020 HEMOGLOBIN ELECTROPHORESIS: CPT

## 2020-12-15 PROCEDURE — 86703 HIV-1/HIV-2 1 RESULT ANTBDY: CPT

## 2020-12-15 PROCEDURE — 86592 SYPHILIS TEST NON-TREP QUAL: CPT

## 2020-12-15 PROCEDURE — 86787 VARICELLA-ZOSTER ANTIBODY: CPT

## 2020-12-15 PROCEDURE — 86901 BLOOD TYPING SEROLOGIC RH(D): CPT

## 2020-12-15 PROCEDURE — 85025 COMPLETE CBC W/AUTO DIFF WBC: CPT

## 2020-12-15 PROCEDURE — 87340 HEPATITIS B SURFACE AG IA: CPT

## 2020-12-15 PROCEDURE — 3008F BODY MASS INDEX DOCD: CPT | Mod: CPTII,S$GLB,, | Performed by: ADVANCED PRACTICE MIDWIFE

## 2020-12-15 PROCEDURE — 99213 OFFICE O/P EST LOW 20 MIN: CPT | Mod: S$GLB,,, | Performed by: ADVANCED PRACTICE MIDWIFE

## 2020-12-15 PROCEDURE — 86762 RUBELLA ANTIBODY: CPT

## 2020-12-15 NOTE — PROGRESS NOTES
Lab Documentation:    Order Type: Written Order placed in [facility]    Patient in for lab visit only per provider treatment plan.       Cane

## 2020-12-16 ENCOUNTER — PATIENT MESSAGE (OUTPATIENT)
Dept: OBSTETRICS AND GYNECOLOGY | Facility: CLINIC | Age: 40
End: 2020-12-16

## 2020-12-16 LAB
HBV SURFACE AG SERPL QL IA: NEGATIVE
HGB A2 MFR BLD HPLC: 2.8 % (ref 2.2–3.2)
HGB FRACT BLD ELPH-IMP: NORMAL
HGB FRACT BLD ELPH-IMP: NORMAL
HIV 1+2 AB+HIV1 P24 AG SERPL QL IA: NEGATIVE
RPR SER QL: NORMAL
RUBV IGG SER-ACNC: 50.8 IU/ML
RUBV IGG SER-IMP: REACTIVE
VARICELLA INTERPRETATION: POSITIVE
VARICELLA ZOSTER IGG: 3.66 ISR (ref 0–0.9)

## 2020-12-17 LAB
C TRACH RRNA SPEC QL NAA+PROBE: NEGATIVE
N GONORRHOEA RRNA SPEC QL NAA+PROBE: NEGATIVE

## 2020-12-17 NOTE — PROGRESS NOTES
HISTORY OF PRESENT ILLNESS:    Danielle Garcia is a 40 y.o. female, ,  Reports LMP of 10/27/20 for a routine exam complaining of amenorrhea.    Pt reports positive UPT at home    Past Medical History:   Diagnosis Date    Hemochromatosis        Past Surgical History:   Procedure Laterality Date    KNEE SURGERY Right 2018    VSD REPAIR      3yo ()       MEDICATIONS AND ALLERGIES:      Current Outpatient Medications:     dextroamphetamine-amphetamine (ADDERALL XR) 10 MG 24 hr capsule, Take 1 capsule (10 mg total) by mouth once daily., Disp: 30 capsule, Rfl: 0    prenatal vit/iron fum/folic ac (PRENATAL 1+1 ORAL), Take by mouth., Disp: , Rfl:     progesterone (PROMETRIUM) 100 MG capsule, Take 1 capsule (100 mg total) by mouth once daily., Disp: 30 capsule, Rfl: 2    ALPRAZolam (XANAX) 0.5 MG tablet, 0.5 mg., Disp: , Rfl:     buPROPion (WELLBUTRIN XL) 150 MG TB24 tablet, TAKE 1 TABLET(150 MG) BY MOUTH EVERY DAY (Patient not taking: Reported on 12/15/2020), Disp: 30 tablet, Rfl: 0    valACYclovir (VALTREX) 500 MG tablet, TK 2 TABLETS PO Q 8 H TILL FEVER BLISTER ARE GONE. GF VALTREX, Disp: , Rfl: 0    zolpidem (AMBIEN CR) 6.25 MG CR tablet, 6.25 mg., Disp: , Rfl:     Review of patient's allergies indicates:  No Known Allergies    Family History   Problem Relation Age of Onset    Breast cancer Neg Hx     Colon cancer Neg Hx     Ovarian cancer Neg Hx        Social History     Socioeconomic History    Marital status:      Spouse name: Not on file    Number of children: Not on file    Years of education: Not on file    Highest education level: Not on file   Occupational History    Not on file   Social Needs    Financial resource strain: Not on file    Food insecurity     Worry: Not on file     Inability: Not on file    Transportation needs     Medical: Not on file     Non-medical: Not on file   Tobacco Use    Smoking status: Never Smoker    Smokeless tobacco: Never Used    Substance and Sexual Activity    Alcohol use: Not Currently    Drug use: Never    Sexual activity: Yes     Partners: Male     Birth control/protection: None   Lifestyle    Physical activity     Days per week: Not on file     Minutes per session: Not on file    Stress: Not on file   Relationships    Social connections     Talks on phone: Not on file     Gets together: Not on file     Attends Restorationist service: Not on file     Active member of club or organization: Not on file     Attends meetings of clubs or organizations: Not on file     Relationship status: Not on file   Other Topics Concern    Not on file   Social History Narrative    Not on file       COMPREHENSIVE GYN HISTORY:  PAP History: Denies abnormal Paps.  Infection History: Denies STDs. Denies PID.  Benign History: Denies uterine fibroids. Denies ovarian cysts. Denies endometriosis. Denies other conditions.  Cancer History: Denies cervical cancer. Denies uterine cancer or hyperplasia. Denies ovarian cancer. Denies vulvar cancer or pre-cancer. Denies vaginal cancer or pre-cancer. Denies breast cancer. Denies colon cancer.  Sexual Activity History: Reports currently being sexually active  Menstrual History: None.  Contraception: None    ROS:  GENERAL: No weight changes. No swelling. No fatigue. No fever.  CARDIOVASCULAR: No chest pain. No shortness of breath. No leg cramps.   NEUROLOGICAL: No headaches. No vision changes.  BREASTS: No pain. No lumps. No discharge.  ABDOMEN: No pain. No nausea. No vomiting. No diarrhea. No constipation.  REPRODUCTIVE: No abnormal bleeding.   VULVA: No pain. No lesions. No itching.  VAGINA: No relaxation. No itching. No odor. No discharge. No lesions.  URINARY: No incontinence. No nocturia. No frequency. No dysuria.    /78   Wt 65.9 kg (145 lb 4.5 oz)   BMI 24.18 kg/m²     PE:  AFFECT: Alert and oriented X 3. Interactive during exam  GENERAL: Appearance well-nourished, well-developed, in no acute  distress.  HEENT: WNL  TEETH: Good dentition..  LUNGS: Easy and unlabored  HEART: Regular rate and rhythm   ABDOMEN: Soft and nontender without masses or organomegally.  EXTREMITIES: No cyanosis, clubbing or edema.   SKIN: No lesions or rashes.  VULVA: No lesions, masses or tenderness.  VAGINA: Moist and well rugated without lesions or discharge.  CERVIX: Moist and pink without lesions, discharge or tenderness.    Slight spotting noted with exam  UTERUS SIZE: 4-6  week size, nontender and without masses.  ADNEXA: No masses or tenderness.  RECTUM: Deferred.  ESTIMATE OF PELVIC CAPACITY: Adequate    PROCEDURES:  UPT Positive  Genprobe        DIAGNOSIS:  Gyn exam  IUP with stated LMP of 10/27/20    PLAN:Routine prenatal care    MEDICATIONS PRESCRIBED:  Has started PNV    LABS AND TESTS ORDERED:  New Ob Labs      1st TRIMESTER COUNSELING: Discussed all, booklet provided  Common complaints of pregnancy  HIV and other routine prenatal tests including  genetic screening  Risk factors identified by prenatal history  Anticipated course of prenatal care  Nutrition and weight gain counseling  Toxoplasmosis precautions (Cats/Raw Meat)  Sexual activity and exercise  Environmental/Work hazards  Travel  Tobacco (Ask, Advise, Assess, Assist, and Arrange), as well as alcohol and drug use  Use of any medications (Including supplements, Vitamins, Herbs, or OTC Drugs)  Indications for Ultrasound  Domestic violence  Seat belt use  Childbirth classes/Hospital facilities     TERATOLOGY COUNSELING: Discussed options for genetic testing and will order p dating scan    FOLLOW-UP - has appt scheduled with Dr. Bhakta

## 2020-12-23 ENCOUNTER — TELEPHONE (OUTPATIENT)
Dept: OBSTETRICS AND GYNECOLOGY | Facility: CLINIC | Age: 40
End: 2020-12-23

## 2020-12-23 NOTE — TELEPHONE ENCOUNTER
----- Message from Mahogany Banks sent at 12/23/2020  9:25 AM CST -----  Patient is requesting a call back to discuss her positive covid results. Patient is around 9 weeks pregnant. Patient can be reached at [phone].

## 2020-12-26 ENCOUNTER — PATIENT MESSAGE (OUTPATIENT)
Dept: OBSTETRICS AND GYNECOLOGY | Facility: CLINIC | Age: 40
End: 2020-12-26

## 2020-12-28 DIAGNOSIS — Z34.90 PREGNANCY, UNSPECIFIED GESTATIONAL AGE: Primary | ICD-10-CM

## 2021-01-04 ENCOUNTER — CLINICAL SUPPORT (OUTPATIENT)
Dept: OBSTETRICS AND GYNECOLOGY | Facility: CLINIC | Age: 41
End: 2021-01-04
Payer: COMMERCIAL

## 2021-01-04 ENCOUNTER — PROCEDURE VISIT (OUTPATIENT)
Dept: MATERNAL FETAL MEDICINE | Facility: CLINIC | Age: 41
End: 2021-01-04
Payer: COMMERCIAL

## 2021-01-04 ENCOUNTER — INITIAL PRENATAL (OUTPATIENT)
Dept: OBSTETRICS AND GYNECOLOGY | Facility: CLINIC | Age: 41
End: 2021-01-04
Payer: COMMERCIAL

## 2021-01-04 VITALS
WEIGHT: 147.69 LBS | DIASTOLIC BLOOD PRESSURE: 76 MMHG | SYSTOLIC BLOOD PRESSURE: 100 MMHG | BODY MASS INDEX: 24.58 KG/M2

## 2021-01-04 VITALS — BODY MASS INDEX: 24.13 KG/M2 | WEIGHT: 145 LBS

## 2021-01-04 DIAGNOSIS — Z34.90 PREGNANCY, UNSPECIFIED GESTATIONAL AGE: ICD-10-CM

## 2021-01-04 DIAGNOSIS — Z23 FLU VACCINE NEED: Primary | ICD-10-CM

## 2021-01-04 DIAGNOSIS — Z3A.09 PREGNANCY WITH 9 COMPLETED WEEKS GESTATION: Primary | ICD-10-CM

## 2021-01-04 PROCEDURE — 90471 FLU VACCINE (QUAD) GREATER THAN OR EQUAL TO 3YO PRESERVATIVE FREE IM: ICD-10-PCS | Mod: S$GLB,,, | Performed by: OBSTETRICS & GYNECOLOGY

## 2021-01-04 PROCEDURE — 99999 PR PBB SHADOW E&M-EST. PATIENT-LVL II: ICD-10-PCS | Mod: PBBFAC,,, | Performed by: OBSTETRICS & GYNECOLOGY

## 2021-01-04 PROCEDURE — 0502F SUBSEQUENT PRENATAL CARE: CPT | Mod: S$GLB,,, | Performed by: OBSTETRICS & GYNECOLOGY

## 2021-01-04 PROCEDURE — 99999 PR PBB SHADOW E&M-EST. PATIENT-LVL II: CPT | Mod: PBBFAC,,, | Performed by: OBSTETRICS & GYNECOLOGY

## 2021-01-04 PROCEDURE — 0502F PR SUBSEQUENT PRENATAL CARE: ICD-10-PCS | Mod: S$GLB,,, | Performed by: OBSTETRICS & GYNECOLOGY

## 2021-01-04 PROCEDURE — 87086 URINE CULTURE/COLONY COUNT: CPT

## 2021-01-04 PROCEDURE — 76817 PR US, OB, TRANSVAG APPROACH: ICD-10-PCS | Mod: S$GLB,,, | Performed by: OBSTETRICS & GYNECOLOGY

## 2021-01-04 PROCEDURE — 99999 PR PBB SHADOW E&M-EST. PATIENT-LVL II: ICD-10-PCS | Mod: PBBFAC,,,

## 2021-01-04 PROCEDURE — 90686 FLU VACCINE (QUAD) GREATER THAN OR EQUAL TO 3YO PRESERVATIVE FREE IM: ICD-10-PCS | Mod: S$GLB,,, | Performed by: OBSTETRICS & GYNECOLOGY

## 2021-01-04 PROCEDURE — 99999 PR PBB SHADOW E&M-EST. PATIENT-LVL II: CPT | Mod: PBBFAC,,,

## 2021-01-04 PROCEDURE — 87147 CULTURE TYPE IMMUNOLOGIC: CPT

## 2021-01-04 PROCEDURE — 90471 IMMUNIZATION ADMIN: CPT | Mod: S$GLB,,, | Performed by: OBSTETRICS & GYNECOLOGY

## 2021-01-04 PROCEDURE — 90686 IIV4 VACC NO PRSV 0.5 ML IM: CPT | Mod: S$GLB,,, | Performed by: OBSTETRICS & GYNECOLOGY

## 2021-01-04 PROCEDURE — 76817 TRANSVAGINAL US OBSTETRIC: CPT | Mod: S$GLB,,, | Performed by: OBSTETRICS & GYNECOLOGY

## 2021-01-04 PROCEDURE — 87088 URINE BACTERIA CULTURE: CPT

## 2021-01-06 LAB — BACTERIA UR CULT: ABNORMAL

## 2021-01-09 ENCOUNTER — PATIENT MESSAGE (OUTPATIENT)
Dept: OBSTETRICS AND GYNECOLOGY | Facility: HOSPITAL | Age: 41
End: 2021-01-09

## 2021-01-09 ENCOUNTER — PATIENT MESSAGE (OUTPATIENT)
Dept: OBSTETRICS AND GYNECOLOGY | Facility: CLINIC | Age: 41
End: 2021-01-09

## 2021-01-09 PROBLEM — B95.1 GROUP B STREPTOCOCCUS URINARY TRACT INFECTION AFFECTING PREGNANCY IN FIRST TRIMESTER: Status: ACTIVE | Noted: 2021-01-09

## 2021-01-09 PROBLEM — O23.41 GROUP B STREPTOCOCCUS URINARY TRACT INFECTION AFFECTING PREGNANCY IN FIRST TRIMESTER: Status: ACTIVE | Noted: 2021-01-09

## 2021-01-20 ENCOUNTER — PATIENT MESSAGE (OUTPATIENT)
Dept: OBSTETRICS AND GYNECOLOGY | Facility: CLINIC | Age: 41
End: 2021-01-20

## 2021-01-23 ENCOUNTER — PATIENT MESSAGE (OUTPATIENT)
Dept: OBSTETRICS AND GYNECOLOGY | Facility: CLINIC | Age: 41
End: 2021-01-23

## 2021-01-25 ENCOUNTER — PATIENT MESSAGE (OUTPATIENT)
Dept: ADMINISTRATIVE | Facility: OTHER | Age: 41
End: 2021-01-25

## 2021-01-26 ENCOUNTER — PATIENT MESSAGE (OUTPATIENT)
Dept: OBSTETRICS AND GYNECOLOGY | Facility: CLINIC | Age: 41
End: 2021-01-26

## 2021-01-28 ENCOUNTER — ROUTINE PRENATAL (OUTPATIENT)
Dept: OBSTETRICS AND GYNECOLOGY | Facility: CLINIC | Age: 41
End: 2021-01-28
Payer: COMMERCIAL

## 2021-01-28 VITALS — BODY MASS INDEX: 24.71 KG/M2 | DIASTOLIC BLOOD PRESSURE: 62 MMHG | WEIGHT: 148.5 LBS | SYSTOLIC BLOOD PRESSURE: 98 MMHG

## 2021-01-28 DIAGNOSIS — R10.2: ICD-10-CM

## 2021-01-28 DIAGNOSIS — O26.891: ICD-10-CM

## 2021-01-28 DIAGNOSIS — Z3A.12 PREGNANCY WITH 12 COMPLETED WEEKS GESTATION: Primary | ICD-10-CM

## 2021-01-28 PROCEDURE — 99999 PR PBB SHADOW E&M-EST. PATIENT-LVL II: ICD-10-PCS | Mod: PBBFAC,,, | Performed by: OBSTETRICS & GYNECOLOGY

## 2021-01-28 PROCEDURE — 99999 PR PBB SHADOW E&M-EST. PATIENT-LVL II: CPT | Mod: PBBFAC,,, | Performed by: OBSTETRICS & GYNECOLOGY

## 2021-01-28 PROCEDURE — 0502F PR SUBSEQUENT PRENATAL CARE: ICD-10-PCS | Mod: CPTII,S$GLB,, | Performed by: OBSTETRICS & GYNECOLOGY

## 2021-01-28 PROCEDURE — 0502F SUBSEQUENT PRENATAL CARE: CPT | Mod: CPTII,S$GLB,, | Performed by: OBSTETRICS & GYNECOLOGY

## 2021-02-01 ENCOUNTER — PATIENT MESSAGE (OUTPATIENT)
Dept: ADMINISTRATIVE | Facility: OTHER | Age: 41
End: 2021-02-01

## 2021-02-02 ENCOUNTER — PATIENT MESSAGE (OUTPATIENT)
Dept: OBSTETRICS AND GYNECOLOGY | Facility: CLINIC | Age: 41
End: 2021-02-02

## 2021-02-25 ENCOUNTER — OFFICE VISIT (OUTPATIENT)
Dept: OBSTETRICS AND GYNECOLOGY | Facility: CLINIC | Age: 41
End: 2021-02-25
Payer: COMMERCIAL

## 2021-02-25 ENCOUNTER — LAB VISIT (OUTPATIENT)
Dept: LAB | Facility: HOSPITAL | Age: 41
End: 2021-02-25
Attending: OBSTETRICS & GYNECOLOGY
Payer: COMMERCIAL

## 2021-02-25 VITALS — WEIGHT: 153 LBS | BODY MASS INDEX: 25.46 KG/M2 | DIASTOLIC BLOOD PRESSURE: 56 MMHG | SYSTOLIC BLOOD PRESSURE: 106 MMHG

## 2021-02-25 DIAGNOSIS — Z3A.16 PREGNANCY WITH 16 COMPLETED WEEKS GESTATION: Primary | ICD-10-CM

## 2021-02-25 DIAGNOSIS — Z3A.16 PREGNANCY WITH 16 COMPLETED WEEKS GESTATION: ICD-10-CM

## 2021-02-25 PROCEDURE — 0502F PR SUBSEQUENT PRENATAL CARE: ICD-10-PCS | Mod: CPTII,S$GLB,, | Performed by: OBSTETRICS & GYNECOLOGY

## 2021-02-25 PROCEDURE — 87088 URINE BACTERIA CULTURE: CPT

## 2021-02-25 PROCEDURE — 82105 ALPHA-FETOPROTEIN SERUM: CPT

## 2021-02-25 PROCEDURE — 99999 PR PBB SHADOW E&M-EST. PATIENT-LVL II: CPT | Mod: PBBFAC,,, | Performed by: OBSTETRICS & GYNECOLOGY

## 2021-02-25 PROCEDURE — 87147 CULTURE TYPE IMMUNOLOGIC: CPT

## 2021-02-25 PROCEDURE — 87086 URINE CULTURE/COLONY COUNT: CPT

## 2021-02-25 PROCEDURE — 0502F SUBSEQUENT PRENATAL CARE: CPT | Mod: CPTII,S$GLB,, | Performed by: OBSTETRICS & GYNECOLOGY

## 2021-02-25 PROCEDURE — 3008F PR BODY MASS INDEX (BMI) DOCUMENTED: ICD-10-PCS | Mod: CPTII,S$GLB,, | Performed by: OBSTETRICS & GYNECOLOGY

## 2021-02-25 PROCEDURE — 3008F BODY MASS INDEX DOCD: CPT | Mod: CPTII,S$GLB,, | Performed by: OBSTETRICS & GYNECOLOGY

## 2021-02-25 PROCEDURE — 36415 COLL VENOUS BLD VENIPUNCTURE: CPT

## 2021-02-25 PROCEDURE — 99999 PR PBB SHADOW E&M-EST. PATIENT-LVL II: ICD-10-PCS | Mod: PBBFAC,,, | Performed by: OBSTETRICS & GYNECOLOGY

## 2021-02-26 ENCOUNTER — PATIENT MESSAGE (OUTPATIENT)
Dept: OBSTETRICS AND GYNECOLOGY | Facility: HOSPITAL | Age: 41
End: 2021-02-26

## 2021-02-27 ENCOUNTER — PATIENT MESSAGE (OUTPATIENT)
Dept: OBSTETRICS AND GYNECOLOGY | Facility: CLINIC | Age: 41
End: 2021-02-27

## 2021-02-27 LAB — BACTERIA UR CULT: ABNORMAL

## 2021-02-27 RX ORDER — AMPICILLIN 500 MG/1
500 CAPSULE ORAL EVERY 6 HOURS
Qty: 28 CAPSULE | Refills: 0 | Status: SHIPPED | OUTPATIENT
Start: 2021-02-27 | End: 2021-04-28 | Stop reason: ALTCHOICE

## 2021-03-03 LAB
# FETUSES US: NORMAL
AFP INTERPRETATION: NORMAL
AFP MOM SERPL: 1.89
AFP SERPL-MCNC: 63.5 NG/ML
AFP SERPL-MCNC: NEGATIVE NG/ML
AGE AT DELIVERY: 41
GA (DAYS): 3 D
GA (WEEKS): 16 WK
GESTATIONAL AGE METHOD: NORMAL
IDDM PATIENT QL: NORMAL
SMOKING STATUS FTND: NO

## 2021-03-07 ENCOUNTER — PATIENT MESSAGE (OUTPATIENT)
Dept: OBSTETRICS AND GYNECOLOGY | Facility: CLINIC | Age: 41
End: 2021-03-07

## 2021-03-30 ENCOUNTER — PATIENT MESSAGE (OUTPATIENT)
Dept: MATERNAL FETAL MEDICINE | Facility: CLINIC | Age: 41
End: 2021-03-30

## 2021-03-31 ENCOUNTER — ROUTINE PRENATAL (OUTPATIENT)
Dept: OBSTETRICS AND GYNECOLOGY | Facility: CLINIC | Age: 41
End: 2021-03-31
Payer: COMMERCIAL

## 2021-03-31 ENCOUNTER — TELEPHONE (OUTPATIENT)
Dept: PEDIATRIC CARDIOLOGY | Facility: CLINIC | Age: 41
End: 2021-03-31

## 2021-03-31 ENCOUNTER — PROCEDURE VISIT (OUTPATIENT)
Dept: MATERNAL FETAL MEDICINE | Facility: CLINIC | Age: 41
End: 2021-03-31
Payer: COMMERCIAL

## 2021-03-31 VITALS — DIASTOLIC BLOOD PRESSURE: 62 MMHG | BODY MASS INDEX: 26.38 KG/M2 | WEIGHT: 158.5 LBS | SYSTOLIC BLOOD PRESSURE: 112 MMHG

## 2021-03-31 DIAGNOSIS — Z82.79 FAMILY HISTORY OF VSD (VENTRICULAR SEPTAL DEFECT): ICD-10-CM

## 2021-03-31 DIAGNOSIS — Z36.89 ENCOUNTER FOR FETAL ANATOMIC SURVEY: ICD-10-CM

## 2021-03-31 DIAGNOSIS — Z36.89 ENCOUNTER FOR FETAL ANATOMIC SURVEY: Primary | ICD-10-CM

## 2021-03-31 DIAGNOSIS — Z3A.21 PREGNANCY WITH 21 COMPLETED WEEKS GESTATION: Primary | ICD-10-CM

## 2021-03-31 DIAGNOSIS — Z36.89 ENCOUNTER FOR ULTRASOUND TO ASSESS FETAL GROWTH: Primary | ICD-10-CM

## 2021-03-31 DIAGNOSIS — O22.02 VARICOSE VEINS OF LOWER EXTREMITY DURING PREGNANCY IN SECOND TRIMESTER: ICD-10-CM

## 2021-03-31 PROCEDURE — 76811 OB US DETAILED SNGL FETUS: CPT | Mod: S$GLB,,, | Performed by: OBSTETRICS & GYNECOLOGY

## 2021-03-31 PROCEDURE — 76811 PR US, OB FETAL EVAL & EXAM, TRANSABDOM,FIRST GESTATION: ICD-10-PCS | Mod: S$GLB,,, | Performed by: OBSTETRICS & GYNECOLOGY

## 2021-03-31 PROCEDURE — 0502F SUBSEQUENT PRENATAL CARE: CPT | Mod: CPTII,S$GLB,, | Performed by: OBSTETRICS & GYNECOLOGY

## 2021-03-31 PROCEDURE — 99999 PR PBB SHADOW E&M-EST. PATIENT-LVL III: CPT | Mod: PBBFAC,,, | Performed by: OBSTETRICS & GYNECOLOGY

## 2021-03-31 PROCEDURE — 0502F PR SUBSEQUENT PRENATAL CARE: ICD-10-PCS | Mod: CPTII,S$GLB,, | Performed by: OBSTETRICS & GYNECOLOGY

## 2021-03-31 PROCEDURE — 99999 PR PBB SHADOW E&M-EST. PATIENT-LVL III: ICD-10-PCS | Mod: PBBFAC,,, | Performed by: OBSTETRICS & GYNECOLOGY

## 2021-04-01 ENCOUNTER — TELEPHONE (OUTPATIENT)
Dept: ORTHOPEDICS | Facility: CLINIC | Age: 41
End: 2021-04-01

## 2021-04-01 ENCOUNTER — TELEPHONE (OUTPATIENT)
Dept: VASCULAR SURGERY | Facility: CLINIC | Age: 41
End: 2021-04-01

## 2021-04-01 DIAGNOSIS — I83.893 VARICOSE VEINS OF LEG WITH SWELLING, BILATERAL: Primary | ICD-10-CM

## 2021-04-07 ENCOUNTER — OFFICE VISIT (OUTPATIENT)
Dept: PEDIATRIC CARDIOLOGY | Facility: CLINIC | Age: 41
End: 2021-04-07
Attending: PEDIATRICS
Payer: COMMERCIAL

## 2021-04-07 ENCOUNTER — CLINICAL SUPPORT (OUTPATIENT)
Dept: PEDIATRIC CARDIOLOGY | Facility: CLINIC | Age: 41
End: 2021-04-07
Payer: COMMERCIAL

## 2021-04-07 ENCOUNTER — PATIENT MESSAGE (OUTPATIENT)
Dept: OBSTETRICS AND GYNECOLOGY | Facility: CLINIC | Age: 41
End: 2021-04-07

## 2021-04-07 VITALS
HEART RATE: 80 BPM | WEIGHT: 161.19 LBS | SYSTOLIC BLOOD PRESSURE: 109 MMHG | HEIGHT: 65 IN | DIASTOLIC BLOOD PRESSURE: 64 MMHG | BODY MASS INDEX: 26.85 KG/M2

## 2021-04-07 DIAGNOSIS — Z82.79 FAMILY HISTORY OF VSD (VENTRICULAR SEPTAL DEFECT): ICD-10-CM

## 2021-04-07 DIAGNOSIS — O35.BXX0 PREGNANCY COMPLICATED BY FETAL CONGENITAL HEART DISEASE, SINGLE OR UNSPECIFIED FETUS: ICD-10-CM

## 2021-04-07 PROCEDURE — 99205 PR OFFICE/OUTPT VISIT, NEW, LEVL V, 60-74 MIN: ICD-10-PCS | Mod: 25,S$GLB,, | Performed by: PEDIATRICS

## 2021-04-07 PROCEDURE — 3008F PR BODY MASS INDEX (BMI) DOCUMENTED: ICD-10-PCS | Mod: CPTII,S$GLB,, | Performed by: PEDIATRICS

## 2021-04-07 PROCEDURE — 76825 PR  SO2 FETAL HEART: ICD-10-PCS | Mod: S$GLB,,, | Performed by: PEDIATRICS

## 2021-04-07 PROCEDURE — 93325 DOPPLER ECHO COLOR FLOW MAPG: CPT | Mod: S$GLB,,, | Performed by: PEDIATRICS

## 2021-04-07 PROCEDURE — 76827 PR  SO2 FETAL HEART DOPPLER: ICD-10-PCS | Mod: S$GLB,,, | Performed by: PEDIATRICS

## 2021-04-07 PROCEDURE — 3008F BODY MASS INDEX DOCD: CPT | Mod: CPTII,S$GLB,, | Performed by: PEDIATRICS

## 2021-04-07 PROCEDURE — 93325 PR DOPPLER COLOR FLOW VELOCITY MAP: ICD-10-PCS | Mod: S$GLB,,, | Performed by: PEDIATRICS

## 2021-04-07 PROCEDURE — 99999 PR PBB SHADOW E&M-EST. PATIENT-LVL III: CPT | Mod: PBBFAC,,, | Performed by: PEDIATRICS

## 2021-04-07 PROCEDURE — 76827 ECHO EXAM OF FETAL HEART: CPT | Mod: S$GLB,,, | Performed by: PEDIATRICS

## 2021-04-07 PROCEDURE — 1126F AMNT PAIN NOTED NONE PRSNT: CPT | Mod: S$GLB,,, | Performed by: PEDIATRICS

## 2021-04-07 PROCEDURE — 99999 PR PBB SHADOW E&M-EST. PATIENT-LVL III: ICD-10-PCS | Mod: PBBFAC,,, | Performed by: PEDIATRICS

## 2021-04-07 PROCEDURE — 76825 ECHO EXAM OF FETAL HEART: CPT | Mod: S$GLB,,, | Performed by: PEDIATRICS

## 2021-04-07 PROCEDURE — 1126F PR PAIN SEVERITY QUANTIFIED, NO PAIN PRESENT: ICD-10-PCS | Mod: S$GLB,,, | Performed by: PEDIATRICS

## 2021-04-07 PROCEDURE — 99205 OFFICE O/P NEW HI 60 MIN: CPT | Mod: 25,S$GLB,, | Performed by: PEDIATRICS

## 2021-04-08 ENCOUNTER — PATIENT MESSAGE (OUTPATIENT)
Dept: OBSTETRICS AND GYNECOLOGY | Facility: CLINIC | Age: 41
End: 2021-04-08

## 2021-04-08 DIAGNOSIS — O35.BXX0 FETAL CARDIAC ANOMALY COMPLICATING PREGNANCY, ANTEPARTUM, SINGLE GESTATION: Primary | ICD-10-CM

## 2021-04-09 ENCOUNTER — DOCUMENTATION ONLY (OUTPATIENT)
Dept: MATERNAL FETAL MEDICINE | Facility: CLINIC | Age: 41
End: 2021-04-09

## 2021-04-09 ENCOUNTER — PATIENT MESSAGE (OUTPATIENT)
Dept: PEDIATRIC CARDIOLOGY | Facility: CLINIC | Age: 41
End: 2021-04-09

## 2021-04-09 ENCOUNTER — TELEPHONE (OUTPATIENT)
Dept: PEDIATRIC CARDIOLOGY | Facility: CLINIC | Age: 41
End: 2021-04-09

## 2021-04-09 PROBLEM — O35.BXX0 PREGNANCY COMPLICATED BY FETAL CONGENITAL HEART DISEASE: Status: ACTIVE | Noted: 2021-04-09

## 2021-04-10 ENCOUNTER — PATIENT MESSAGE (OUTPATIENT)
Dept: OBSTETRICS AND GYNECOLOGY | Facility: CLINIC | Age: 41
End: 2021-04-10

## 2021-04-13 ENCOUNTER — TELEPHONE (OUTPATIENT)
Dept: MATERNAL FETAL MEDICINE | Facility: CLINIC | Age: 41
End: 2021-04-13

## 2021-04-13 ENCOUNTER — PATIENT MESSAGE (OUTPATIENT)
Dept: OBSTETRICS AND GYNECOLOGY | Facility: CLINIC | Age: 41
End: 2021-04-13

## 2021-04-13 DIAGNOSIS — Q24.9 CONGENITAL HEART DISEASE IN ADULT: Primary | ICD-10-CM

## 2021-04-15 ENCOUNTER — PATIENT MESSAGE (OUTPATIENT)
Dept: OBSTETRICS AND GYNECOLOGY | Facility: CLINIC | Age: 41
End: 2021-04-15

## 2021-04-16 ENCOUNTER — PATIENT MESSAGE (OUTPATIENT)
Dept: RESEARCH | Facility: HOSPITAL | Age: 41
End: 2021-04-16

## 2021-04-20 ENCOUNTER — PATIENT MESSAGE (OUTPATIENT)
Dept: PEDIATRIC CARDIOLOGY | Facility: CLINIC | Age: 41
End: 2021-04-20

## 2021-04-20 DIAGNOSIS — Z87.74 S/P VSD CLOSURE: Primary | ICD-10-CM

## 2021-04-22 ENCOUNTER — PATIENT MESSAGE (OUTPATIENT)
Dept: UROLOGY | Facility: CLINIC | Age: 41
End: 2021-04-22

## 2021-04-22 ENCOUNTER — PATIENT MESSAGE (OUTPATIENT)
Dept: OBSTETRICS AND GYNECOLOGY | Facility: CLINIC | Age: 41
End: 2021-04-22

## 2021-04-26 ENCOUNTER — PATIENT MESSAGE (OUTPATIENT)
Dept: ADMINISTRATIVE | Facility: OTHER | Age: 41
End: 2021-04-26

## 2021-04-27 ENCOUNTER — ROUTINE PRENATAL (OUTPATIENT)
Dept: OBSTETRICS AND GYNECOLOGY | Facility: CLINIC | Age: 41
End: 2021-04-27
Payer: COMMERCIAL

## 2021-04-27 VITALS
BODY MASS INDEX: 27.77 KG/M2 | WEIGHT: 166.88 LBS | SYSTOLIC BLOOD PRESSURE: 100 MMHG | DIASTOLIC BLOOD PRESSURE: 60 MMHG

## 2021-04-27 DIAGNOSIS — O09.522 MULTIGRAVIDA OF ADVANCED MATERNAL AGE IN SECOND TRIMESTER: ICD-10-CM

## 2021-04-27 DIAGNOSIS — O35.BXX0 FETAL CARDIAC ANOMALY COMPLICATING PREGNANCY, ANTEPARTUM, SINGLE GESTATION: ICD-10-CM

## 2021-04-27 DIAGNOSIS — Z3A.25 25 WEEKS GESTATION OF PREGNANCY: Primary | ICD-10-CM

## 2021-04-27 PROCEDURE — 99999 PR PBB SHADOW E&M-EST. PATIENT-LVL III: CPT | Mod: PBBFAC,,, | Performed by: OBSTETRICS & GYNECOLOGY

## 2021-04-27 PROCEDURE — 99999 PR PBB SHADOW E&M-EST. PATIENT-LVL III: ICD-10-PCS | Mod: PBBFAC,,, | Performed by: OBSTETRICS & GYNECOLOGY

## 2021-04-27 PROCEDURE — 0502F SUBSEQUENT PRENATAL CARE: CPT | Mod: CPTII,S$GLB,, | Performed by: OBSTETRICS & GYNECOLOGY

## 2021-04-27 PROCEDURE — 0502F PR SUBSEQUENT PRENATAL CARE: ICD-10-PCS | Mod: CPTII,S$GLB,, | Performed by: OBSTETRICS & GYNECOLOGY

## 2021-04-28 ENCOUNTER — PROCEDURE VISIT (OUTPATIENT)
Dept: MATERNAL FETAL MEDICINE | Facility: CLINIC | Age: 41
End: 2021-04-28
Payer: COMMERCIAL

## 2021-04-28 ENCOUNTER — OFFICE VISIT (OUTPATIENT)
Dept: MATERNAL FETAL MEDICINE | Facility: CLINIC | Age: 41
End: 2021-04-28
Payer: COMMERCIAL

## 2021-04-28 VITALS — SYSTOLIC BLOOD PRESSURE: 108 MMHG | WEIGHT: 166.44 LBS | BODY MASS INDEX: 27.7 KG/M2 | DIASTOLIC BLOOD PRESSURE: 60 MMHG

## 2021-04-28 DIAGNOSIS — O35.BXX0 PREGNANCY COMPLICATED BY FETAL CONGENITAL HEART DISEASE, SINGLE OR UNSPECIFIED FETUS: ICD-10-CM

## 2021-04-28 DIAGNOSIS — O35.BXX0 FETAL CARDIAC ANOMALY COMPLICATING PREGNANCY, ANTEPARTUM, SINGLE GESTATION: ICD-10-CM

## 2021-04-28 DIAGNOSIS — Z36.89 ENCOUNTER FOR ULTRASOUND TO ASSESS FETAL GROWTH: ICD-10-CM

## 2021-04-28 DIAGNOSIS — Z36.89 ENCOUNTER FOR ULTRASOUND TO ASSESS FETAL GROWTH: Primary | ICD-10-CM

## 2021-04-28 DIAGNOSIS — Q28.9 CONGENITAL ANOMALY OF CARDIOVASCULAR SYSTEM: ICD-10-CM

## 2021-04-28 PROCEDURE — 99999 PR PBB SHADOW E&M-EST. PATIENT-LVL III: ICD-10-PCS | Mod: PBBFAC,,, | Performed by: OBSTETRICS & GYNECOLOGY

## 2021-04-28 PROCEDURE — 76816 OB US FOLLOW-UP PER FETUS: CPT | Mod: S$GLB,,, | Performed by: OBSTETRICS & GYNECOLOGY

## 2021-04-28 PROCEDURE — 99999 PR PBB SHADOW E&M-EST. PATIENT-LVL III: CPT | Mod: PBBFAC,,, | Performed by: OBSTETRICS & GYNECOLOGY

## 2021-04-28 PROCEDURE — 1126F PR PAIN SEVERITY QUANTIFIED, NO PAIN PRESENT: ICD-10-PCS | Mod: S$GLB,,, | Performed by: OBSTETRICS & GYNECOLOGY

## 2021-04-28 PROCEDURE — 1126F AMNT PAIN NOTED NONE PRSNT: CPT | Mod: S$GLB,,, | Performed by: OBSTETRICS & GYNECOLOGY

## 2021-04-28 PROCEDURE — 3008F BODY MASS INDEX DOCD: CPT | Mod: CPTII,S$GLB,, | Performed by: OBSTETRICS & GYNECOLOGY

## 2021-04-28 PROCEDURE — 99215 PR OFFICE/OUTPT VISIT, EST, LEVL V, 40-54 MIN: ICD-10-PCS | Mod: S$GLB,,, | Performed by: OBSTETRICS & GYNECOLOGY

## 2021-04-28 PROCEDURE — 76816 PR  US,PREGNANT UTERUS,F/U,TRANSABD APP: ICD-10-PCS | Mod: S$GLB,,, | Performed by: OBSTETRICS & GYNECOLOGY

## 2021-04-28 PROCEDURE — 99215 OFFICE O/P EST HI 40 MIN: CPT | Mod: S$GLB,,, | Performed by: OBSTETRICS & GYNECOLOGY

## 2021-04-28 PROCEDURE — 3008F PR BODY MASS INDEX (BMI) DOCUMENTED: ICD-10-PCS | Mod: CPTII,S$GLB,, | Performed by: OBSTETRICS & GYNECOLOGY

## 2021-04-29 ENCOUNTER — HOSPITAL ENCOUNTER (OUTPATIENT)
Dept: RADIOLOGY | Facility: OTHER | Age: 41
Discharge: HOME | End: 2021-04-29
Attending: SURGERY
Payer: COMMERCIAL

## 2021-04-29 ENCOUNTER — PATIENT MESSAGE (OUTPATIENT)
Dept: OBSTETRICS AND GYNECOLOGY | Facility: CLINIC | Age: 41
End: 2021-04-29

## 2021-04-29 DIAGNOSIS — I83.893 VARICOSE VEINS OF LEG WITH SWELLING, BILATERAL: ICD-10-CM

## 2021-04-29 PROCEDURE — 93970 EXTREMITY STUDY: CPT | Mod: 26,,, | Performed by: RADIOLOGY

## 2021-04-29 PROCEDURE — 93970 EXTREMITY STUDY: CPT | Mod: TC

## 2021-04-29 PROCEDURE — 93970 US LOWER EXTREMITY VEINS BILATERAL INSUFFICIENCY: ICD-10-PCS | Mod: 26,,, | Performed by: RADIOLOGY

## 2021-05-01 ENCOUNTER — PATIENT MESSAGE (OUTPATIENT)
Dept: OBSTETRICS AND GYNECOLOGY | Facility: CLINIC | Age: 41
End: 2021-05-01

## 2021-05-07 ENCOUNTER — OFFICE VISIT (OUTPATIENT)
Dept: VASCULAR SURGERY | Facility: CLINIC | Age: 41
End: 2021-05-07
Payer: COMMERCIAL

## 2021-05-07 VITALS
HEART RATE: 91 BPM | WEIGHT: 166.44 LBS | BODY MASS INDEX: 27.73 KG/M2 | SYSTOLIC BLOOD PRESSURE: 93 MMHG | HEIGHT: 65 IN | DIASTOLIC BLOOD PRESSURE: 62 MMHG

## 2021-05-07 DIAGNOSIS — I87.2 VENOUS INSUFFICIENCY OF BOTH LOWER EXTREMITIES: Primary | ICD-10-CM

## 2021-05-07 DIAGNOSIS — O22.02 VARICOSE VEINS OF LOWER EXTREMITY DURING PREGNANCY IN SECOND TRIMESTER: ICD-10-CM

## 2021-05-07 DIAGNOSIS — I78.1: ICD-10-CM

## 2021-05-07 DIAGNOSIS — O22.03 VARICOSE VEINS OF LOWER EXTREMITY DURING PREGNANCY IN THIRD TRIMESTER: ICD-10-CM

## 2021-05-07 DIAGNOSIS — O99.891: ICD-10-CM

## 2021-05-07 PROCEDURE — 99203 OFFICE O/P NEW LOW 30 MIN: CPT | Mod: S$GLB,,, | Performed by: SURGERY

## 2021-05-07 PROCEDURE — 3008F BODY MASS INDEX DOCD: CPT | Mod: CPTII,S$GLB,, | Performed by: SURGERY

## 2021-05-07 PROCEDURE — 99203 PR OFFICE/OUTPT VISIT, NEW, LEVL III, 30-44 MIN: ICD-10-PCS | Mod: S$GLB,,, | Performed by: SURGERY

## 2021-05-07 PROCEDURE — 3008F PR BODY MASS INDEX (BMI) DOCUMENTED: ICD-10-PCS | Mod: CPTII,S$GLB,, | Performed by: SURGERY

## 2021-05-11 DIAGNOSIS — O35.BXX0 PREGNANCY COMPLICATED BY FETAL CONGENITAL HEART DISEASE, SINGLE OR UNSPECIFIED FETUS: Primary | ICD-10-CM

## 2021-05-12 ENCOUNTER — CLINICAL SUPPORT (OUTPATIENT)
Dept: PEDIATRIC CARDIOLOGY | Facility: CLINIC | Age: 41
End: 2021-05-12
Payer: COMMERCIAL

## 2021-05-12 ENCOUNTER — OFFICE VISIT (OUTPATIENT)
Dept: PEDIATRIC CARDIOLOGY | Facility: CLINIC | Age: 41
End: 2021-05-12
Attending: PEDIATRICS
Payer: COMMERCIAL

## 2021-05-12 DIAGNOSIS — O35.BXX0 PREGNANCY COMPLICATED BY FETAL CONGENITAL HEART DISEASE, SINGLE OR UNSPECIFIED FETUS: Primary | ICD-10-CM

## 2021-05-12 DIAGNOSIS — O35.BXX0 PREGNANCY COMPLICATED BY FETAL CONGENITAL HEART DISEASE, SINGLE OR UNSPECIFIED FETUS: ICD-10-CM

## 2021-05-12 PROCEDURE — 99213 PR OFFICE/OUTPT VISIT, EST, LEVL III, 20-29 MIN: ICD-10-PCS | Mod: 25,S$GLB,, | Performed by: PEDIATRICS

## 2021-05-12 PROCEDURE — 93325 DOPPLER ECHO COLOR FLOW MAPG: CPT | Mod: S$GLB,,, | Performed by: PEDIATRICS

## 2021-05-12 PROCEDURE — 76827 PR  SO2 FETAL HEART DOPPLER: ICD-10-PCS | Mod: S$GLB,,, | Performed by: PEDIATRICS

## 2021-05-12 PROCEDURE — 99213 OFFICE O/P EST LOW 20 MIN: CPT | Mod: 25,S$GLB,, | Performed by: PEDIATRICS

## 2021-05-12 PROCEDURE — 76827 ECHO EXAM OF FETAL HEART: CPT | Mod: S$GLB,,, | Performed by: PEDIATRICS

## 2021-05-12 PROCEDURE — 93325 PR DOPPLER COLOR FLOW VELOCITY MAP: ICD-10-PCS | Mod: S$GLB,,, | Performed by: PEDIATRICS

## 2021-05-12 PROCEDURE — 76825 ECHO EXAM OF FETAL HEART: CPT | Mod: S$GLB,,, | Performed by: PEDIATRICS

## 2021-05-12 PROCEDURE — 76825 PR  SO2 FETAL HEART: ICD-10-PCS | Mod: S$GLB,,, | Performed by: PEDIATRICS

## 2021-05-13 ENCOUNTER — DOCUMENTATION ONLY (OUTPATIENT)
Dept: MATERNAL FETAL MEDICINE | Facility: CLINIC | Age: 41
End: 2021-05-13

## 2021-05-13 DIAGNOSIS — O35.BXX0 PREGNANCY COMPLICATED BY FETAL CONGENITAL HEART DISEASE, SINGLE OR UNSPECIFIED FETUS: Primary | ICD-10-CM

## 2021-05-23 ENCOUNTER — PATIENT MESSAGE (OUTPATIENT)
Dept: OBSTETRICS AND GYNECOLOGY | Facility: CLINIC | Age: 41
End: 2021-05-23

## 2021-05-25 ENCOUNTER — ROUTINE PRENATAL (OUTPATIENT)
Dept: OBSTETRICS AND GYNECOLOGY | Facility: CLINIC | Age: 41
End: 2021-05-25
Payer: COMMERCIAL

## 2021-05-25 ENCOUNTER — LAB VISIT (OUTPATIENT)
Dept: LAB | Facility: HOSPITAL | Age: 41
End: 2021-05-25
Attending: OBSTETRICS & GYNECOLOGY
Payer: COMMERCIAL

## 2021-05-25 VITALS
SYSTOLIC BLOOD PRESSURE: 112 MMHG | BODY MASS INDEX: 28.07 KG/M2 | WEIGHT: 168.63 LBS | DIASTOLIC BLOOD PRESSURE: 64 MMHG

## 2021-05-25 DIAGNOSIS — Z3A.25 25 WEEKS GESTATION OF PREGNANCY: ICD-10-CM

## 2021-05-25 DIAGNOSIS — Z3A.29 PREGNANCY WITH 29 COMPLETED WEEKS GESTATION: Primary | ICD-10-CM

## 2021-05-25 LAB
BASOPHILS # BLD AUTO: 0.04 K/UL (ref 0–0.2)
BASOPHILS NFR BLD: 0.5 % (ref 0–1.9)
DIFFERENTIAL METHOD: ABNORMAL
EOSINOPHIL # BLD AUTO: 0.1 K/UL (ref 0–0.5)
EOSINOPHIL NFR BLD: 1.3 % (ref 0–8)
ERYTHROCYTE [DISTWIDTH] IN BLOOD BY AUTOMATED COUNT: 12.9 % (ref 11.5–14.5)
GLUCOSE SERPL-MCNC: 97 MG/DL (ref 70–140)
HCT VFR BLD AUTO: 36.7 % (ref 37–48.5)
HGB BLD-MCNC: 12.8 G/DL (ref 12–16)
IMM GRANULOCYTES # BLD AUTO: 0.09 K/UL (ref 0–0.04)
IMM GRANULOCYTES NFR BLD AUTO: 1.2 % (ref 0–0.5)
LYMPHOCYTES # BLD AUTO: 1.6 K/UL (ref 1–4.8)
LYMPHOCYTES NFR BLD: 21 % (ref 18–48)
MCH RBC QN AUTO: 34.3 PG (ref 27–31)
MCHC RBC AUTO-ENTMCNC: 34.9 G/DL (ref 32–36)
MCV RBC AUTO: 98 FL (ref 82–98)
MONOCYTES # BLD AUTO: 0.5 K/UL (ref 0.3–1)
MONOCYTES NFR BLD: 5.9 % (ref 4–15)
NEUTROPHILS # BLD AUTO: 5.3 K/UL (ref 1.8–7.7)
NEUTROPHILS NFR BLD: 70.1 % (ref 38–73)
NRBC BLD-RTO: 0 /100 WBC
PLATELET # BLD AUTO: 192 K/UL (ref 150–450)
PMV BLD AUTO: 10.4 FL (ref 9.2–12.9)
RBC # BLD AUTO: 3.73 M/UL (ref 4–5.4)
WBC # BLD AUTO: 7.58 K/UL (ref 3.9–12.7)

## 2021-05-25 PROCEDURE — 0502F SUBSEQUENT PRENATAL CARE: CPT | Mod: CPTII,S$GLB,, | Performed by: OBSTETRICS & GYNECOLOGY

## 2021-05-25 PROCEDURE — 85025 COMPLETE CBC W/AUTO DIFF WBC: CPT | Performed by: OBSTETRICS & GYNECOLOGY

## 2021-05-25 PROCEDURE — 82950 GLUCOSE TEST: CPT | Performed by: OBSTETRICS & GYNECOLOGY

## 2021-05-25 PROCEDURE — 99999 PR PBB SHADOW E&M-EST. PATIENT-LVL II: CPT | Mod: PBBFAC,,, | Performed by: OBSTETRICS & GYNECOLOGY

## 2021-05-25 PROCEDURE — 99999 PR PBB SHADOW E&M-EST. PATIENT-LVL II: ICD-10-PCS | Mod: PBBFAC,,, | Performed by: OBSTETRICS & GYNECOLOGY

## 2021-05-25 PROCEDURE — 0502F PR SUBSEQUENT PRENATAL CARE: ICD-10-PCS | Mod: CPTII,S$GLB,, | Performed by: OBSTETRICS & GYNECOLOGY

## 2021-05-25 PROCEDURE — 36415 COLL VENOUS BLD VENIPUNCTURE: CPT | Performed by: OBSTETRICS & GYNECOLOGY

## 2021-05-26 ENCOUNTER — PROCEDURE VISIT (OUTPATIENT)
Dept: MATERNAL FETAL MEDICINE | Facility: CLINIC | Age: 41
End: 2021-05-26
Payer: COMMERCIAL

## 2021-05-26 ENCOUNTER — HOSPITAL ENCOUNTER (OUTPATIENT)
Dept: CARDIOLOGY | Facility: CLINIC | Age: 41
Discharge: HOME | End: 2021-05-26
Payer: COMMERCIAL

## 2021-05-26 ENCOUNTER — HOSPITAL ENCOUNTER (OUTPATIENT)
Dept: CARDIOLOGY | Facility: HOSPITAL | Age: 41
Discharge: HOME | End: 2021-05-26
Attending: PEDIATRICS
Payer: COMMERCIAL

## 2021-05-26 ENCOUNTER — OFFICE VISIT (OUTPATIENT)
Dept: CARDIOLOGY | Facility: CLINIC | Age: 41
End: 2021-05-26
Payer: COMMERCIAL

## 2021-05-26 VITALS
OXYGEN SATURATION: 96 % | HEIGHT: 65 IN | BODY MASS INDEX: 27.66 KG/M2 | WEIGHT: 170.63 LBS | HEIGHT: 65 IN | WEIGHT: 166 LBS | SYSTOLIC BLOOD PRESSURE: 100 MMHG | BODY MASS INDEX: 28.43 KG/M2 | DIASTOLIC BLOOD PRESSURE: 63 MMHG | HEART RATE: 85 BPM

## 2021-05-26 DIAGNOSIS — Q24.9 CONGENITAL HEART DISEASE IN ADULT: ICD-10-CM

## 2021-05-26 DIAGNOSIS — Z87.74 S/P VSD CLOSURE: ICD-10-CM

## 2021-05-26 DIAGNOSIS — Q24.9 ADULT CONGENITAL HEART DISEASE: ICD-10-CM

## 2021-05-26 DIAGNOSIS — Z36.89 ENCOUNTER FOR ULTRASOUND TO ASSESS FETAL GROWTH: ICD-10-CM

## 2021-05-26 DIAGNOSIS — O35.BXX0 PREGNANCY COMPLICATED BY FETAL CONGENITAL HEART DISEASE, SINGLE OR UNSPECIFIED FETUS: Primary | ICD-10-CM

## 2021-05-26 DIAGNOSIS — Q28.9 CONGENITAL ANOMALY OF CARDIOVASCULAR SYSTEM: ICD-10-CM

## 2021-05-26 LAB
ASCENDING AORTA: 2.44 CM
AV INDEX (PROSTH): 0.89
AV MEAN GRADIENT: 4 MMHG
AV PEAK GRADIENT: 6 MMHG
AV VALVE AREA: 2.91 CM2
AV VELOCITY RATIO: 0.93
BSA FOR ECHO PROCEDURE: 1.86 M2
CV ECHO LV RWT: 0.33 CM
DOP CALC AO PEAK VEL: 1.21 M/S
DOP CALC AO VTI: 21.06 CM
DOP CALC LVOT AREA: 3.3 CM2
DOP CALC LVOT DIAMETER: 2.04 CM
DOP CALC LVOT PEAK VEL: 1.13 M/S
DOP CALC LVOT STROKE VOLUME: 61.35 CM3
DOP CALC RVOT PEAK VEL: 1 M/S
DOP CALC RVOT VTI: 16.73 CM
DOP CALCLVOT PEAK VEL VTI: 18.78 CM
E WAVE DECELERATION TIME: 136.63 MSEC
E/A RATIO: 1.14
E/E' RATIO: 3.74 M/S
ECHO LV POSTERIOR WALL: 0.71 CM (ref 0.6–1.1)
EJECTION FRACTION: 55 %
FRACTIONAL SHORTENING: 40 % (ref 28–44)
INTERVENTRICULAR SEPTUM: 0.77 CM (ref 0.6–1.1)
LA MAJOR: 4.21 CM
LA MINOR: 4.16 CM
LA WIDTH: 3.2 CM
LEFT ATRIUM SIZE: 2.45 CM
LEFT ATRIUM VOLUME INDEX MOD: 16.8 ML/M2
LEFT ATRIUM VOLUME INDEX: 15.2 ML/M2
LEFT ATRIUM VOLUME MOD: 30.81 CM3
LEFT ATRIUM VOLUME: 27.89 CM3
LEFT INTERNAL DIMENSION IN SYSTOLE: 2.56 CM (ref 2.1–4)
LEFT VENTRICLE DIASTOLIC VOLUME INDEX: 44.11 ML/M2
LEFT VENTRICLE DIASTOLIC VOLUME: 80.72 ML
LEFT VENTRICLE MASS INDEX: 51 G/M2
LEFT VENTRICLE SYSTOLIC VOLUME INDEX: 12.9 ML/M2
LEFT VENTRICLE SYSTOLIC VOLUME: 23.62 ML
LEFT VENTRICULAR INTERNAL DIMENSION IN DIASTOLE: 4.25 CM (ref 3.5–6)
LEFT VENTRICULAR MASS: 93.26 G
LV LATERAL E/E' RATIO: 3.63 M/S
LV SEPTAL E/E' RATIO: 3.87 M/S
MV PEAK A VEL: 0.51 M/S
MV PEAK E VEL: 0.58 M/S
MV STENOSIS PRESSURE HALF TIME: 39.62 MS
MV VALVE AREA P 1/2 METHOD: 5.55 CM2
PISA TR MAX VEL: 2.08 M/S
PV MEAN GRADIENT: 2 MMHG
PV PEAK VELOCITY: 1.22 CM/S
RA MAJOR: 4.29 CM
RA WIDTH: 2.74 CM
RIGHT VENTRICULAR END-DIASTOLIC DIMENSION: 3.9 CM
RV TISSUE DOPPLER FREE WALL SYSTOLIC VELOCITY 1 (APICAL 4 CHAMBER VIEW): 10.78 CM/S
SINUS: 2.55 CM
STJ: 2.37 CM
TDI LATERAL: 0.16 M/S
TDI SEPTAL: 0.15 M/S
TDI: 0.16 M/S
TR MAX PG: 17 MMHG
TRICUSPID ANNULAR PLANE SYSTOLIC EXCURSION: 2.11 CM

## 2021-05-26 PROCEDURE — 93010 EKG 12-LEAD: ICD-10-PCS | Mod: S$GLB,,, | Performed by: INTERNAL MEDICINE

## 2021-05-26 PROCEDURE — 93325 DOPPLER ECHO COLOR FLOW MAPG: CPT | Mod: 26,,, | Performed by: PEDIATRICS

## 2021-05-26 PROCEDURE — 93010 ELECTROCARDIOGRAM REPORT: CPT | Mod: S$GLB,,, | Performed by: INTERNAL MEDICINE

## 2021-05-26 PROCEDURE — 99999 PR PBB SHADOW E&M-EST. PATIENT-LVL III: ICD-10-PCS | Mod: PBBFAC,,, | Performed by: PEDIATRICS

## 2021-05-26 PROCEDURE — 3008F BODY MASS INDEX DOCD: CPT | Mod: CPTII,S$GLB,, | Performed by: PEDIATRICS

## 2021-05-26 PROCEDURE — 93325 ECHO (CUPID ONLY): ICD-10-PCS | Mod: 26,,, | Performed by: PEDIATRICS

## 2021-05-26 PROCEDURE — 76816 OB US FOLLOW-UP PER FETUS: CPT | Mod: S$GLB,,, | Performed by: OBSTETRICS & GYNECOLOGY

## 2021-05-26 PROCEDURE — 3008F PR BODY MASS INDEX (BMI) DOCUMENTED: ICD-10-PCS | Mod: CPTII,S$GLB,, | Performed by: PEDIATRICS

## 2021-05-26 PROCEDURE — 99999 PR PBB SHADOW E&M-EST. PATIENT-LVL III: CPT | Mod: PBBFAC,,, | Performed by: PEDIATRICS

## 2021-05-26 PROCEDURE — 93320 DOPPLER ECHO COMPLETE: CPT

## 2021-05-26 PROCEDURE — 93303 ECHO (CUPID ONLY): ICD-10-PCS | Mod: 26,,, | Performed by: PEDIATRICS

## 2021-05-26 PROCEDURE — 1126F AMNT PAIN NOTED NONE PRSNT: CPT | Mod: S$GLB,,, | Performed by: PEDIATRICS

## 2021-05-26 PROCEDURE — 99214 OFFICE O/P EST MOD 30 MIN: CPT | Mod: 25,S$GLB,, | Performed by: PEDIATRICS

## 2021-05-26 PROCEDURE — 93320 ECHO (CUPID ONLY): ICD-10-PCS | Mod: 26,,, | Performed by: PEDIATRICS

## 2021-05-26 PROCEDURE — 99214 PR OFFICE/OUTPT VISIT, EST, LEVL IV, 30-39 MIN: ICD-10-PCS | Mod: 25,S$GLB,, | Performed by: PEDIATRICS

## 2021-05-26 PROCEDURE — 93320 DOPPLER ECHO COMPLETE: CPT | Mod: 26,,, | Performed by: PEDIATRICS

## 2021-05-26 PROCEDURE — 76816 PR  US,PREGNANT UTERUS,F/U,TRANSABD APP: ICD-10-PCS | Mod: S$GLB,,, | Performed by: OBSTETRICS & GYNECOLOGY

## 2021-05-26 PROCEDURE — 93303 ECHO TRANSTHORACIC: CPT | Mod: 26,,, | Performed by: PEDIATRICS

## 2021-05-26 PROCEDURE — 1126F PR PAIN SEVERITY QUANTIFIED, NO PAIN PRESENT: ICD-10-PCS | Mod: S$GLB,,, | Performed by: PEDIATRICS

## 2021-05-26 PROCEDURE — 93005 ELECTROCARDIOGRAM TRACING: CPT | Mod: S$GLB,,, | Performed by: PEDIATRICS

## 2021-05-26 PROCEDURE — 93005 EKG 12-LEAD: ICD-10-PCS | Mod: S$GLB,,, | Performed by: PEDIATRICS

## 2021-05-26 RX ORDER — DEXTROAMPHETAMINE SACCHARATE, AMPHETAMINE ASPARTATE, DEXTROAMPHETAMINE SULFATE AND AMPHETAMINE SULFATE 5; 5; 5; 5 MG/1; MG/1; MG/1; MG/1
TABLET ORAL
COMMUNITY
Start: 2021-05-07 | End: 2023-08-17

## 2021-06-15 ENCOUNTER — ROUTINE PRENATAL (OUTPATIENT)
Dept: OBSTETRICS AND GYNECOLOGY | Facility: CLINIC | Age: 41
End: 2021-06-15
Payer: COMMERCIAL

## 2021-06-15 ENCOUNTER — CLINICAL SUPPORT (OUTPATIENT)
Dept: OBSTETRICS AND GYNECOLOGY | Facility: CLINIC | Age: 41
End: 2021-06-15
Payer: COMMERCIAL

## 2021-06-15 VITALS
WEIGHT: 176.56 LBS | BODY MASS INDEX: 29.39 KG/M2 | DIASTOLIC BLOOD PRESSURE: 32 MMHG | BODY MASS INDEX: 29.39 KG/M2 | WEIGHT: 176.56 LBS | SYSTOLIC BLOOD PRESSURE: 110 MMHG

## 2021-06-15 DIAGNOSIS — Z23 NEED FOR TDAP VACCINATION: Primary | ICD-10-CM

## 2021-06-15 DIAGNOSIS — Z3A.32 PREGNANCY WITH 32 COMPLETED WEEKS GESTATION: Primary | ICD-10-CM

## 2021-06-15 PROCEDURE — 99999 PR PBB SHADOW E&M-EST. PATIENT-LVL II: ICD-10-PCS | Mod: PBBFAC,,,

## 2021-06-15 PROCEDURE — 99999 PR PBB SHADOW E&M-EST. PATIENT-LVL II: CPT | Mod: PBBFAC,,,

## 2021-06-15 PROCEDURE — 90715 TDAP VACCINE 7 YRS/> IM: CPT | Mod: S$GLB,,, | Performed by: OBSTETRICS & GYNECOLOGY

## 2021-06-15 PROCEDURE — 99999 PR PBB SHADOW E&M-EST. PATIENT-LVL II: ICD-10-PCS | Mod: PBBFAC,,, | Performed by: OBSTETRICS & GYNECOLOGY

## 2021-06-15 PROCEDURE — 90715 TDAP VACCINE GREATER THAN OR EQUAL TO 7YO IM: ICD-10-PCS | Mod: S$GLB,,, | Performed by: OBSTETRICS & GYNECOLOGY

## 2021-06-15 PROCEDURE — 0502F PR SUBSEQUENT PRENATAL CARE: ICD-10-PCS | Mod: CPTII,S$GLB,, | Performed by: OBSTETRICS & GYNECOLOGY

## 2021-06-15 PROCEDURE — 90471 IMMUNIZATION ADMIN: CPT | Mod: S$GLB,,, | Performed by: OBSTETRICS & GYNECOLOGY

## 2021-06-15 PROCEDURE — 90471 TDAP VACCINE GREATER THAN OR EQUAL TO 7YO IM: ICD-10-PCS | Mod: S$GLB,,, | Performed by: OBSTETRICS & GYNECOLOGY

## 2021-06-15 PROCEDURE — 99999 PR PBB SHADOW E&M-EST. PATIENT-LVL II: CPT | Mod: PBBFAC,,, | Performed by: OBSTETRICS & GYNECOLOGY

## 2021-06-15 PROCEDURE — 0502F SUBSEQUENT PRENATAL CARE: CPT | Mod: CPTII,S$GLB,, | Performed by: OBSTETRICS & GYNECOLOGY

## 2021-06-16 ENCOUNTER — CLINICAL SUPPORT (OUTPATIENT)
Dept: PEDIATRIC CARDIOLOGY | Facility: CLINIC | Age: 41
End: 2021-06-16
Payer: COMMERCIAL

## 2021-06-16 ENCOUNTER — OFFICE VISIT (OUTPATIENT)
Dept: PEDIATRIC CARDIOLOGY | Facility: CLINIC | Age: 41
End: 2021-06-16
Attending: PEDIATRICS
Payer: COMMERCIAL

## 2021-06-16 VITALS
HEART RATE: 102 BPM | BODY MASS INDEX: 28.95 KG/M2 | OXYGEN SATURATION: 97 % | HEIGHT: 65 IN | WEIGHT: 173.75 LBS | SYSTOLIC BLOOD PRESSURE: 114 MMHG | DIASTOLIC BLOOD PRESSURE: 67 MMHG

## 2021-06-16 DIAGNOSIS — Q28.9 CONGENITAL ANOMALY OF CARDIOVASCULAR SYSTEM: ICD-10-CM

## 2021-06-16 DIAGNOSIS — O35.BXX0 PREGNANCY COMPLICATED BY FETAL CONGENITAL HEART DISEASE, SINGLE OR UNSPECIFIED FETUS: ICD-10-CM

## 2021-06-16 DIAGNOSIS — O35.BXX0 PREGNANCY COMPLICATED BY FETAL CONGENITAL HEART DISEASE, SINGLE OR UNSPECIFIED FETUS: Primary | ICD-10-CM

## 2021-06-16 DIAGNOSIS — Q24.9 ADULT CONGENITAL HEART DISEASE: ICD-10-CM

## 2021-06-16 PROCEDURE — 99212 PR OFFICE/OUTPT VISIT, EST, LEVL II, 10-19 MIN: ICD-10-PCS | Mod: 25,S$GLB,, | Performed by: PEDIATRICS

## 2021-06-16 PROCEDURE — 76825 ECHO EXAM OF FETAL HEART: CPT | Mod: S$GLB,,, | Performed by: PEDIATRICS

## 2021-06-16 PROCEDURE — 76825 PR  SO2 FETAL HEART: ICD-10-PCS | Mod: S$GLB,,, | Performed by: PEDIATRICS

## 2021-06-16 PROCEDURE — 76827 ECHO EXAM OF FETAL HEART: CPT | Mod: S$GLB,,, | Performed by: PEDIATRICS

## 2021-06-16 PROCEDURE — 93325 DOPPLER ECHO COLOR FLOW MAPG: CPT | Mod: S$GLB,,, | Performed by: PEDIATRICS

## 2021-06-16 PROCEDURE — 76827 PR  SO2 FETAL HEART DOPPLER: ICD-10-PCS | Mod: S$GLB,,, | Performed by: PEDIATRICS

## 2021-06-16 PROCEDURE — 99212 OFFICE O/P EST SF 10 MIN: CPT | Mod: 25,S$GLB,, | Performed by: PEDIATRICS

## 2021-06-16 PROCEDURE — 99999 PR PBB SHADOW E&M-EST. PATIENT-LVL III: CPT | Mod: PBBFAC,,,

## 2021-06-16 PROCEDURE — 93325 PR DOPPLER COLOR FLOW VELOCITY MAP: ICD-10-PCS | Mod: S$GLB,,, | Performed by: PEDIATRICS

## 2021-06-16 PROCEDURE — 99999 PR PBB SHADOW E&M-EST. PATIENT-LVL III: ICD-10-PCS | Mod: PBBFAC,,,

## 2021-06-23 ENCOUNTER — HOSPITAL ENCOUNTER (OUTPATIENT)
Dept: PERINATAL CARE | Facility: OTHER | Age: 41
Discharge: HOME | End: 2021-06-23
Attending: OBSTETRICS & GYNECOLOGY
Payer: COMMERCIAL

## 2021-06-23 ENCOUNTER — PROCEDURE VISIT (OUTPATIENT)
Dept: MATERNAL FETAL MEDICINE | Facility: CLINIC | Age: 41
End: 2021-06-23
Payer: COMMERCIAL

## 2021-06-23 ENCOUNTER — PATIENT MESSAGE (OUTPATIENT)
Dept: OBSTETRICS AND GYNECOLOGY | Facility: CLINIC | Age: 41
End: 2021-06-23

## 2021-06-23 DIAGNOSIS — O35.9XX0 FETAL ABNORMALITY AFFECTING MANAGEMENT OF MOTHER, SINGLE OR UNSPECIFIED FETUS: ICD-10-CM

## 2021-06-23 DIAGNOSIS — Q24.9 ADULT CONGENITAL HEART DISEASE: ICD-10-CM

## 2021-06-23 DIAGNOSIS — O09.523 MULTIGRAVIDA OF ADVANCED MATERNAL AGE IN THIRD TRIMESTER: ICD-10-CM

## 2021-06-23 DIAGNOSIS — O36.8390 FETAL ARRHYTHMIA AFFECTING PREGNANCY, ANTEPARTUM: ICD-10-CM

## 2021-06-23 DIAGNOSIS — Z36.89 ENCOUNTER FOR ULTRASOUND TO ASSESS FETAL GROWTH: ICD-10-CM

## 2021-06-23 DIAGNOSIS — O09.529 ADVANCED MATERNAL AGE IN MULTIGRAVIDA, UNSPECIFIED TRIMESTER: Primary | ICD-10-CM

## 2021-06-23 DIAGNOSIS — O35.BXX0 PREGNANCY COMPLICATED BY FETAL CONGENITAL HEART DISEASE, SINGLE OR UNSPECIFIED FETUS: ICD-10-CM

## 2021-06-23 DIAGNOSIS — O36.8390 FETAL ARRHYTHMIA AFFECTING PREGNANCY, ANTEPARTUM: Primary | ICD-10-CM

## 2021-06-23 DIAGNOSIS — Z3A.33 33 WEEKS GESTATION OF PREGNANCY: ICD-10-CM

## 2021-06-23 PROCEDURE — 76816 OB US FOLLOW-UP PER FETUS: CPT | Mod: GT,S$GLB,, | Performed by: OBSTETRICS & GYNECOLOGY

## 2021-06-23 PROCEDURE — 76819 FETAL BIOPHYS PROFIL W/O NST: CPT | Mod: 59,GT,S$GLB, | Performed by: OBSTETRICS & GYNECOLOGY

## 2021-06-23 PROCEDURE — 59025 PR FETAL 2N-STRESS TEST: ICD-10-PCS | Mod: 26,,, | Performed by: OBSTETRICS & GYNECOLOGY

## 2021-06-23 PROCEDURE — 76816 PR  US,PREGNANT UTERUS,F/U,TRANSABD APP: ICD-10-PCS | Mod: GT,S$GLB,, | Performed by: OBSTETRICS & GYNECOLOGY

## 2021-06-23 PROCEDURE — 99213 PR OFFICE/OUTPT VISIT, EST, LEVL III, 20-29 MIN: ICD-10-PCS | Mod: 25,S$GLB,, | Performed by: OBSTETRICS & GYNECOLOGY

## 2021-06-23 PROCEDURE — 76819 PR US, OB, FETAL BIOPHYSICAL, W/O NST: ICD-10-PCS | Mod: 59,GT,S$GLB, | Performed by: OBSTETRICS & GYNECOLOGY

## 2021-06-23 PROCEDURE — 59025 FETAL NON-STRESS TEST: CPT | Mod: 26,,, | Performed by: OBSTETRICS & GYNECOLOGY

## 2021-06-23 PROCEDURE — 99213 OFFICE O/P EST LOW 20 MIN: CPT | Mod: 25,S$GLB,, | Performed by: OBSTETRICS & GYNECOLOGY

## 2021-06-24 ENCOUNTER — TELEPHONE (OUTPATIENT)
Dept: OBSTETRICS AND GYNECOLOGY | Facility: CLINIC | Age: 41
End: 2021-06-24

## 2021-06-24 ENCOUNTER — PATIENT MESSAGE (OUTPATIENT)
Dept: OBSTETRICS AND GYNECOLOGY | Facility: CLINIC | Age: 41
End: 2021-06-24

## 2021-06-25 ENCOUNTER — ROUTINE PRENATAL (OUTPATIENT)
Dept: OBSTETRICS AND GYNECOLOGY | Facility: CLINIC | Age: 41
End: 2021-06-25
Payer: COMMERCIAL

## 2021-06-25 VITALS — BODY MASS INDEX: 29.37 KG/M2 | WEIGHT: 176.5 LBS | DIASTOLIC BLOOD PRESSURE: 60 MMHG | SYSTOLIC BLOOD PRESSURE: 108 MMHG

## 2021-06-25 DIAGNOSIS — O35.BXX0 PREGNANCY COMPLICATED BY FETAL CONGENITAL HEART DISEASE, SINGLE OR UNSPECIFIED FETUS: ICD-10-CM

## 2021-06-25 DIAGNOSIS — Z3A.33 PREGNANCY WITH 33 COMPLETED WEEKS GESTATION: Primary | ICD-10-CM

## 2021-06-25 DIAGNOSIS — O09.513 PRIMIGRAVIDA OF ADVANCED MATERNAL AGE IN THIRD TRIMESTER: ICD-10-CM

## 2021-06-25 PROCEDURE — 0502F SUBSEQUENT PRENATAL CARE: CPT | Mod: CPTII,S$GLB,, | Performed by: OBSTETRICS & GYNECOLOGY

## 2021-06-25 PROCEDURE — 99999 PR PBB SHADOW E&M-EST. PATIENT-LVL III: ICD-10-PCS | Mod: PBBFAC,,, | Performed by: OBSTETRICS & GYNECOLOGY

## 2021-06-25 PROCEDURE — 59025 FETAL NON-STRESS TEST: CPT | Mod: S$GLB,,, | Performed by: OBSTETRICS & GYNECOLOGY

## 2021-06-25 PROCEDURE — 0502F PR SUBSEQUENT PRENATAL CARE: ICD-10-PCS | Mod: CPTII,S$GLB,, | Performed by: OBSTETRICS & GYNECOLOGY

## 2021-06-25 PROCEDURE — 99999 PR PBB SHADOW E&M-EST. PATIENT-LVL III: CPT | Mod: PBBFAC,,, | Performed by: OBSTETRICS & GYNECOLOGY

## 2021-06-25 PROCEDURE — 59025 PR FETAL 2N-STRESS TEST: ICD-10-PCS | Mod: S$GLB,,, | Performed by: OBSTETRICS & GYNECOLOGY

## 2021-06-26 ENCOUNTER — PATIENT MESSAGE (OUTPATIENT)
Dept: OBSTETRICS AND GYNECOLOGY | Facility: CLINIC | Age: 41
End: 2021-06-26

## 2021-06-28 ENCOUNTER — PATIENT MESSAGE (OUTPATIENT)
Dept: OBSTETRICS AND GYNECOLOGY | Facility: CLINIC | Age: 41
End: 2021-06-28

## 2021-06-28 ENCOUNTER — PATIENT MESSAGE (OUTPATIENT)
Dept: MATERNAL FETAL MEDICINE | Facility: CLINIC | Age: 41
End: 2021-06-28

## 2021-06-28 DIAGNOSIS — Z3A.34 34 WEEKS GESTATION OF PREGNANCY: Primary | ICD-10-CM

## 2021-06-30 ENCOUNTER — HOSPITAL ENCOUNTER (OUTPATIENT)
Dept: PERINATAL CARE | Facility: OTHER | Age: 41
Discharge: HOME | End: 2021-06-30
Attending: OBSTETRICS & GYNECOLOGY
Payer: COMMERCIAL

## 2021-06-30 DIAGNOSIS — O09.529 ADVANCED MATERNAL AGE IN MULTIGRAVIDA, UNSPECIFIED TRIMESTER: ICD-10-CM

## 2021-06-30 PROCEDURE — 76805 OB US >/= 14 WKS SNGL FETUS: CPT

## 2021-06-30 PROCEDURE — 76815 OB US LIMITED FETUS(S): CPT | Mod: 26,,, | Performed by: OBSTETRICS & GYNECOLOGY

## 2021-06-30 PROCEDURE — 59025 FETAL NON-STRESS TEST: CPT | Mod: 26,,, | Performed by: OBSTETRICS & GYNECOLOGY

## 2021-06-30 PROCEDURE — 76815 OB US LIMITED FETUS(S): CPT

## 2021-06-30 PROCEDURE — 59025 PR FETAL 2N-STRESS TEST: ICD-10-PCS | Mod: 26,,, | Performed by: OBSTETRICS & GYNECOLOGY

## 2021-06-30 PROCEDURE — 76815 PR  US,PREGNANT UTERUS,LIMITED, 1/> FETUSES: ICD-10-PCS | Mod: 26,,, | Performed by: OBSTETRICS & GYNECOLOGY

## 2021-06-30 PROCEDURE — 59025 FETAL NON-STRESS TEST: CPT

## 2021-07-02 ENCOUNTER — PATIENT MESSAGE (OUTPATIENT)
Dept: OBSTETRICS AND GYNECOLOGY | Facility: CLINIC | Age: 41
End: 2021-07-02

## 2021-07-06 ENCOUNTER — HOSPITAL ENCOUNTER (OUTPATIENT)
Dept: RADIOLOGY | Facility: HOSPITAL | Age: 41
Discharge: HOME | End: 2021-07-06
Attending: OBSTETRICS & GYNECOLOGY
Payer: COMMERCIAL

## 2021-07-06 ENCOUNTER — ROUTINE PRENATAL (OUTPATIENT)
Dept: OBSTETRICS AND GYNECOLOGY | Facility: CLINIC | Age: 41
End: 2021-07-06
Payer: COMMERCIAL

## 2021-07-06 ENCOUNTER — HOSPITAL ENCOUNTER (OUTPATIENT)
Facility: HOSPITAL | Age: 41
Discharge: HOME | End: 2021-07-06
Attending: OBSTETRICS & GYNECOLOGY | Admitting: OBSTETRICS & GYNECOLOGY
Payer: COMMERCIAL

## 2021-07-06 VITALS
WEIGHT: 177.69 LBS | SYSTOLIC BLOOD PRESSURE: 104 MMHG | BODY MASS INDEX: 29.57 KG/M2 | DIASTOLIC BLOOD PRESSURE: 62 MMHG

## 2021-07-06 DIAGNOSIS — Z3A.35 PREGNANCY WITH 35 COMPLETED WEEKS GESTATION: Primary | ICD-10-CM

## 2021-07-06 PROCEDURE — 76815 OB US LIMITED FETUS(S): CPT | Mod: 26,,, | Performed by: RADIOLOGY

## 2021-07-06 PROCEDURE — 0502F SUBSEQUENT PRENATAL CARE: CPT | Mod: CPTII,S$GLB,, | Performed by: OBSTETRICS & GYNECOLOGY

## 2021-07-06 PROCEDURE — 76815 OB US LIMITED FETUS(S): CPT | Mod: TC

## 2021-07-06 PROCEDURE — 99999 PR PBB SHADOW E&M-EST. PATIENT-LVL II: ICD-10-PCS | Mod: PBBFAC,,, | Performed by: OBSTETRICS & GYNECOLOGY

## 2021-07-06 PROCEDURE — 76819 US OB LIMITED WITH BIOPHYSICAL PROFILE: ICD-10-PCS | Mod: 26,,, | Performed by: RADIOLOGY

## 2021-07-06 PROCEDURE — 99999 PR PBB SHADOW E&M-EST. PATIENT-LVL II: CPT | Mod: PBBFAC,,, | Performed by: OBSTETRICS & GYNECOLOGY

## 2021-07-06 PROCEDURE — 76815 US OB LIMITED WITH BIOPHYSICAL PROFILE: ICD-10-PCS | Mod: 26,,, | Performed by: RADIOLOGY

## 2021-07-06 PROCEDURE — 99211 OFF/OP EST MAY X REQ PHY/QHP: CPT | Mod: 25

## 2021-07-06 PROCEDURE — 59025 FETAL NON-STRESS TEST: CPT

## 2021-07-06 PROCEDURE — 76819 FETAL BIOPHYS PROFIL W/O NST: CPT | Mod: 26,,, | Performed by: RADIOLOGY

## 2021-07-06 PROCEDURE — 0502F PR SUBSEQUENT PRENATAL CARE: ICD-10-PCS | Mod: CPTII,S$GLB,, | Performed by: OBSTETRICS & GYNECOLOGY

## 2021-07-09 ENCOUNTER — PATIENT MESSAGE (OUTPATIENT)
Dept: OBSTETRICS AND GYNECOLOGY | Facility: CLINIC | Age: 41
End: 2021-07-09

## 2021-07-13 ENCOUNTER — ROUTINE PRENATAL (OUTPATIENT)
Dept: OBSTETRICS AND GYNECOLOGY | Facility: CLINIC | Age: 41
End: 2021-07-13
Payer: COMMERCIAL

## 2021-07-13 VITALS
BODY MASS INDEX: 29.97 KG/M2 | SYSTOLIC BLOOD PRESSURE: 102 MMHG | WEIGHT: 180.13 LBS | DIASTOLIC BLOOD PRESSURE: 62 MMHG

## 2021-07-13 DIAGNOSIS — Z3A.36 36 WEEKS GESTATION OF PREGNANCY: Primary | ICD-10-CM

## 2021-07-13 PROCEDURE — 0502F SUBSEQUENT PRENATAL CARE: CPT | Mod: CPTII,S$GLB,, | Performed by: OBSTETRICS & GYNECOLOGY

## 2021-07-13 PROCEDURE — 59025 PR FETAL 2N-STRESS TEST: ICD-10-PCS | Mod: S$GLB,,, | Performed by: OBSTETRICS & GYNECOLOGY

## 2021-07-13 PROCEDURE — 59025 FETAL NON-STRESS TEST: CPT | Mod: S$GLB,,, | Performed by: OBSTETRICS & GYNECOLOGY

## 2021-07-13 PROCEDURE — 99999 PR PBB SHADOW E&M-EST. PATIENT-LVL II: ICD-10-PCS | Mod: PBBFAC,,, | Performed by: OBSTETRICS & GYNECOLOGY

## 2021-07-13 PROCEDURE — 0502F PR SUBSEQUENT PRENATAL CARE: ICD-10-PCS | Mod: CPTII,S$GLB,, | Performed by: OBSTETRICS & GYNECOLOGY

## 2021-07-13 PROCEDURE — 99999 PR PBB SHADOW E&M-EST. PATIENT-LVL II: CPT | Mod: PBBFAC,,, | Performed by: OBSTETRICS & GYNECOLOGY

## 2021-07-14 ENCOUNTER — PATIENT MESSAGE (OUTPATIENT)
Dept: OBSTETRICS AND GYNECOLOGY | Facility: CLINIC | Age: 41
End: 2021-07-14

## 2021-07-15 ENCOUNTER — PATIENT MESSAGE (OUTPATIENT)
Dept: OBSTETRICS AND GYNECOLOGY | Facility: CLINIC | Age: 41
End: 2021-07-15

## 2021-07-16 ENCOUNTER — PATIENT MESSAGE (OUTPATIENT)
Dept: OBSTETRICS AND GYNECOLOGY | Facility: CLINIC | Age: 41
End: 2021-07-16

## 2021-07-19 ENCOUNTER — PROCEDURE VISIT (OUTPATIENT)
Dept: MATERNAL FETAL MEDICINE | Facility: CLINIC | Age: 41
End: 2021-07-19
Payer: COMMERCIAL

## 2021-07-19 ENCOUNTER — PATIENT MESSAGE (OUTPATIENT)
Dept: OBSTETRICS AND GYNECOLOGY | Facility: CLINIC | Age: 41
End: 2021-07-19

## 2021-07-19 ENCOUNTER — LAB VISIT (OUTPATIENT)
Dept: LAB | Facility: OTHER | Age: 41
End: 2021-07-19
Attending: OBSTETRICS & GYNECOLOGY
Payer: COMMERCIAL

## 2021-07-19 DIAGNOSIS — Q28.9 CONGENITAL ANOMALY OF CARDIOVASCULAR SYSTEM: ICD-10-CM

## 2021-07-19 DIAGNOSIS — Z3A.35 PREGNANCY WITH 35 COMPLETED WEEKS GESTATION: ICD-10-CM

## 2021-07-19 DIAGNOSIS — O09.513 PRIMIGRAVIDA OF ADVANCED MATERNAL AGE IN THIRD TRIMESTER: ICD-10-CM

## 2021-07-19 DIAGNOSIS — Q24.9 ADULT CONGENITAL HEART DISEASE: ICD-10-CM

## 2021-07-19 DIAGNOSIS — Z3A.34 34 WEEKS GESTATION OF PREGNANCY: ICD-10-CM

## 2021-07-19 LAB
BASOPHILS # BLD AUTO: 0.03 K/UL (ref 0–0.2)
BASOPHILS NFR BLD: 0.3 % (ref 0–1.9)
DIFFERENTIAL METHOD: ABNORMAL
EOSINOPHIL # BLD AUTO: 0.1 K/UL (ref 0–0.5)
EOSINOPHIL NFR BLD: 0.8 % (ref 0–8)
ERYTHROCYTE [DISTWIDTH] IN BLOOD BY AUTOMATED COUNT: 12.9 % (ref 11.5–14.5)
HCT VFR BLD AUTO: 38.4 % (ref 37–48.5)
HGB BLD-MCNC: 13.4 G/DL (ref 12–16)
IMM GRANULOCYTES # BLD AUTO: 0.15 K/UL (ref 0–0.04)
IMM GRANULOCYTES NFR BLD AUTO: 1.4 % (ref 0–0.5)
LYMPHOCYTES # BLD AUTO: 1.5 K/UL (ref 1–4.8)
LYMPHOCYTES NFR BLD: 14.3 % (ref 18–48)
MCH RBC QN AUTO: 33.8 PG (ref 27–31)
MCHC RBC AUTO-ENTMCNC: 34.9 G/DL (ref 32–36)
MCV RBC AUTO: 97 FL (ref 82–98)
MONOCYTES # BLD AUTO: 0.6 K/UL (ref 0.3–1)
MONOCYTES NFR BLD: 6.1 % (ref 4–15)
NEUTROPHILS # BLD AUTO: 8 K/UL (ref 1.8–7.7)
NEUTROPHILS NFR BLD: 77.1 % (ref 38–73)
NRBC BLD-RTO: 0 /100 WBC
PLATELET # BLD AUTO: 218 K/UL (ref 150–450)
PMV BLD AUTO: 10.6 FL (ref 9.2–12.9)
RBC # BLD AUTO: 3.96 M/UL (ref 4–5.4)
WBC # BLD AUTO: 10.41 K/UL (ref 3.9–12.7)

## 2021-07-19 PROCEDURE — 76816 OB US FOLLOW-UP PER FETUS: CPT | Mod: S$GLB,,, | Performed by: OBSTETRICS & GYNECOLOGY

## 2021-07-19 PROCEDURE — 87389 HIV-1 AG W/HIV-1&-2 AB AG IA: CPT | Performed by: OBSTETRICS & GYNECOLOGY

## 2021-07-19 PROCEDURE — 76819 FETAL BIOPHYS PROFIL W/O NST: CPT | Mod: S$GLB,,, | Performed by: OBSTETRICS & GYNECOLOGY

## 2021-07-19 PROCEDURE — 76816 PR  US,PREGNANT UTERUS,F/U,TRANSABD APP: ICD-10-PCS | Mod: S$GLB,,, | Performed by: OBSTETRICS & GYNECOLOGY

## 2021-07-19 PROCEDURE — 36415 COLL VENOUS BLD VENIPUNCTURE: CPT | Performed by: OBSTETRICS & GYNECOLOGY

## 2021-07-19 PROCEDURE — 86592 SYPHILIS TEST NON-TREP QUAL: CPT | Performed by: OBSTETRICS & GYNECOLOGY

## 2021-07-19 PROCEDURE — 85025 COMPLETE CBC W/AUTO DIFF WBC: CPT | Performed by: OBSTETRICS & GYNECOLOGY

## 2021-07-19 PROCEDURE — 76819 PR US, OB, FETAL BIOPHYSICAL, W/O NST: ICD-10-PCS | Mod: S$GLB,,, | Performed by: OBSTETRICS & GYNECOLOGY

## 2021-07-20 ENCOUNTER — ROUTINE PRENATAL (OUTPATIENT)
Dept: OBSTETRICS AND GYNECOLOGY | Facility: CLINIC | Age: 41
End: 2021-07-20
Payer: COMMERCIAL

## 2021-07-20 VITALS
BODY MASS INDEX: 30.39 KG/M2 | DIASTOLIC BLOOD PRESSURE: 60 MMHG | SYSTOLIC BLOOD PRESSURE: 106 MMHG | WEIGHT: 182.63 LBS

## 2021-07-20 DIAGNOSIS — Z3A.37 37 WEEKS GESTATION OF PREGNANCY: Primary | ICD-10-CM

## 2021-07-20 DIAGNOSIS — Z3A.39 PREGNANCY WITH 39 COMPLETED WEEKS GESTATION: ICD-10-CM

## 2021-07-20 LAB
HIV 1+2 AB+HIV1 P24 AG SERPL QL IA: NEGATIVE
RPR SER QL: NORMAL

## 2021-07-20 PROCEDURE — 0502F SUBSEQUENT PRENATAL CARE: CPT | Mod: CPTII,S$GLB,, | Performed by: OBSTETRICS & GYNECOLOGY

## 2021-07-20 PROCEDURE — 99999 PR PBB SHADOW E&M-EST. PATIENT-LVL II: CPT | Mod: PBBFAC,,, | Performed by: OBSTETRICS & GYNECOLOGY

## 2021-07-20 PROCEDURE — 99999 PR PBB SHADOW E&M-EST. PATIENT-LVL II: ICD-10-PCS | Mod: PBBFAC,,, | Performed by: OBSTETRICS & GYNECOLOGY

## 2021-07-20 PROCEDURE — 0502F PR SUBSEQUENT PRENATAL CARE: ICD-10-PCS | Mod: CPTII,S$GLB,, | Performed by: OBSTETRICS & GYNECOLOGY

## 2021-07-24 RX ORDER — ONDANSETRON 4 MG/1
8 TABLET, ORALLY DISINTEGRATING ORAL EVERY 8 HOURS PRN
Status: CANCELLED | OUTPATIENT
Start: 2021-07-24

## 2021-07-24 RX ORDER — CARBOPROST TROMETHAMINE 250 UG/ML
250 INJECTION, SOLUTION INTRAMUSCULAR
Status: CANCELLED | OUTPATIENT
Start: 2021-07-24

## 2021-07-24 RX ORDER — MISOPROSTOL 100 UG/1
800 TABLET ORAL
Status: CANCELLED | OUTPATIENT
Start: 2021-07-24

## 2021-07-24 RX ORDER — MISOPROSTOL 100 MCG
25 TABLET ORAL EVERY 4 HOURS PRN
Status: CANCELLED | OUTPATIENT
Start: 2021-07-24

## 2021-07-24 RX ORDER — CALCIUM CARBONATE 200(500)MG
500 TABLET,CHEWABLE ORAL 3 TIMES DAILY PRN
Status: CANCELLED | OUTPATIENT
Start: 2021-07-24

## 2021-07-24 RX ORDER — OXYTOCIN/RINGER'S LACTATE 30/500 ML
0-30 PLASTIC BAG, INJECTION (ML) INTRAVENOUS CONTINUOUS
Status: CANCELLED | OUTPATIENT
Start: 2021-07-24

## 2021-07-24 RX ORDER — METHYLERGONOVINE MALEATE 0.2 MG/ML
200 INJECTION INTRAVENOUS
Status: CANCELLED | OUTPATIENT
Start: 2021-07-24

## 2021-07-24 RX ORDER — SIMETHICONE 80 MG
1 TABLET,CHEWABLE ORAL 4 TIMES DAILY PRN
Status: CANCELLED | OUTPATIENT
Start: 2021-07-24

## 2021-07-24 RX ORDER — SODIUM CHLORIDE, SODIUM LACTATE, POTASSIUM CHLORIDE, CALCIUM CHLORIDE 600; 310; 30; 20 MG/100ML; MG/100ML; MG/100ML; MG/100ML
INJECTION, SOLUTION INTRAVENOUS CONTINUOUS
Status: CANCELLED | OUTPATIENT
Start: 2021-07-24

## 2021-07-24 RX ORDER — DIPHENOXYLATE HYDROCHLORIDE AND ATROPINE SULFATE 2.5; .025 MG/1; MG/1
1 TABLET ORAL 4 TIMES DAILY PRN
Status: CANCELLED | OUTPATIENT
Start: 2021-07-24

## 2021-07-24 RX ORDER — BUTORPHANOL TARTRATE 1 MG/ML
1 INJECTION INTRAMUSCULAR; INTRAVENOUS
Status: CANCELLED | OUTPATIENT
Start: 2021-07-24

## 2021-07-24 RX ORDER — MUPIROCIN 20 MG/G
OINTMENT TOPICAL
Status: CANCELLED | OUTPATIENT
Start: 2021-07-24

## 2021-07-24 RX ORDER — OXYTOCIN/RINGER'S LACTATE 30/500 ML
334 PLASTIC BAG, INJECTION (ML) INTRAVENOUS ONCE
Status: CANCELLED | OUTPATIENT
Start: 2021-07-24 | End: 2021-07-24

## 2021-07-24 RX ORDER — PROCHLORPERAZINE EDISYLATE 5 MG/ML
5 INJECTION INTRAMUSCULAR; INTRAVENOUS EVERY 6 HOURS PRN
Status: CANCELLED | OUTPATIENT
Start: 2021-07-24

## 2021-07-24 RX ORDER — OXYTOCIN/RINGER'S LACTATE 30/500 ML
95 PLASTIC BAG, INJECTION (ML) INTRAVENOUS ONCE
Status: CANCELLED | OUTPATIENT
Start: 2021-07-24 | End: 2021-07-24

## 2021-07-24 RX ORDER — TERBUTALINE SULFATE 1 MG/ML
0.25 INJECTION SUBCUTANEOUS
Status: CANCELLED | OUTPATIENT
Start: 2021-07-24

## 2021-07-27 ENCOUNTER — ROUTINE PRENATAL (OUTPATIENT)
Dept: OBSTETRICS AND GYNECOLOGY | Facility: CLINIC | Age: 41
End: 2021-07-27
Payer: COMMERCIAL

## 2021-07-27 VITALS
WEIGHT: 180.56 LBS | SYSTOLIC BLOOD PRESSURE: 106 MMHG | BODY MASS INDEX: 30.05 KG/M2 | DIASTOLIC BLOOD PRESSURE: 64 MMHG

## 2021-07-27 DIAGNOSIS — O35.BXX0 PREGNANCY COMPLICATED BY FETAL CONGENITAL HEART DISEASE, SINGLE OR UNSPECIFIED FETUS: ICD-10-CM

## 2021-07-27 DIAGNOSIS — Q28.9 CONGENITAL ANOMALY OF CARDIOVASCULAR SYSTEM: ICD-10-CM

## 2021-07-27 DIAGNOSIS — Z3A.38 PREGNANCY WITH 38 COMPLETED WEEKS GESTATION: Primary | ICD-10-CM

## 2021-07-27 PROCEDURE — 0502F PR SUBSEQUENT PRENATAL CARE: ICD-10-PCS | Mod: CPTII,S$GLB,, | Performed by: OBSTETRICS & GYNECOLOGY

## 2021-07-27 PROCEDURE — 59025 PR FETAL 2N-STRESS TEST: ICD-10-PCS | Mod: 26,S$GLB,, | Performed by: OBSTETRICS & GYNECOLOGY

## 2021-07-27 PROCEDURE — 0502F SUBSEQUENT PRENATAL CARE: CPT | Mod: CPTII,S$GLB,, | Performed by: OBSTETRICS & GYNECOLOGY

## 2021-07-27 PROCEDURE — 59025 FETAL NON-STRESS TEST: CPT | Mod: 26,S$GLB,, | Performed by: OBSTETRICS & GYNECOLOGY

## 2021-07-27 PROCEDURE — 99999 PR PBB SHADOW E&M-EST. PATIENT-LVL II: ICD-10-PCS | Mod: PBBFAC,,, | Performed by: OBSTETRICS & GYNECOLOGY

## 2021-07-27 PROCEDURE — 99999 PR PBB SHADOW E&M-EST. PATIENT-LVL II: CPT | Mod: PBBFAC,,, | Performed by: OBSTETRICS & GYNECOLOGY

## 2021-07-28 ENCOUNTER — PATIENT MESSAGE (OUTPATIENT)
Dept: OBSTETRICS AND GYNECOLOGY | Facility: CLINIC | Age: 41
End: 2021-07-28

## 2021-07-29 ENCOUNTER — TELEPHONE (OUTPATIENT)
Dept: OBSTETRICS AND GYNECOLOGY | Facility: CLINIC | Age: 41
End: 2021-07-29

## 2021-08-02 ENCOUNTER — HOSPITAL ENCOUNTER (INPATIENT)
Facility: HOSPITAL | Age: 41
LOS: 2 days | Discharge: HOME | End: 2021-08-04
Attending: OBSTETRICS & GYNECOLOGY | Admitting: OBSTETRICS & GYNECOLOGY
Payer: COMMERCIAL

## 2021-08-02 ENCOUNTER — ANESTHESIA EVENT (OUTPATIENT)
Dept: OBSTETRICS AND GYNECOLOGY | Facility: HOSPITAL | Age: 41
End: 2021-08-02
Payer: COMMERCIAL

## 2021-08-02 ENCOUNTER — ANESTHESIA (OUTPATIENT)
Dept: OBSTETRICS AND GYNECOLOGY | Facility: HOSPITAL | Age: 41
End: 2021-08-02
Payer: COMMERCIAL

## 2021-08-02 DIAGNOSIS — Z3A.39 PREGNANCY WITH 39 COMPLETED WEEKS GESTATION: ICD-10-CM

## 2021-08-02 LAB
ABO + RH BLD: NORMAL
BASOPHILS # BLD AUTO: 0.03 K/UL (ref 0–0.2)
BASOPHILS NFR BLD: 0.3 % (ref 0–1.9)
BLD GP AB SCN CELLS X3 SERPL QL: NORMAL
DIFFERENTIAL METHOD: ABNORMAL
EOSINOPHIL # BLD AUTO: 0.1 K/UL (ref 0–0.5)
EOSINOPHIL NFR BLD: 0.9 % (ref 0–8)
ERYTHROCYTE [DISTWIDTH] IN BLOOD BY AUTOMATED COUNT: 13 % (ref 11.5–14.5)
HCT VFR BLD AUTO: 40 % (ref 37–48.5)
HGB BLD-MCNC: 13.9 G/DL (ref 12–16)
IMM GRANULOCYTES # BLD AUTO: 0.09 K/UL (ref 0–0.04)
IMM GRANULOCYTES NFR BLD AUTO: 1 % (ref 0–0.5)
LYMPHOCYTES # BLD AUTO: 1.7 K/UL (ref 1–4.8)
LYMPHOCYTES NFR BLD: 19.3 % (ref 18–48)
MCH RBC QN AUTO: 33.9 PG (ref 27–31)
MCHC RBC AUTO-ENTMCNC: 34.8 G/DL (ref 32–36)
MCV RBC AUTO: 98 FL (ref 82–98)
MONOCYTES # BLD AUTO: 0.4 K/UL (ref 0.3–1)
MONOCYTES NFR BLD: 4.6 % (ref 4–15)
NEUTROPHILS # BLD AUTO: 6.5 K/UL (ref 1.8–7.7)
NEUTROPHILS NFR BLD: 73.9 % (ref 38–73)
NRBC BLD-RTO: 0 /100 WBC
PLATELET # BLD AUTO: 195 K/UL (ref 150–450)
PMV BLD AUTO: 11 FL (ref 9.2–12.9)
RBC # BLD AUTO: 4.1 M/UL (ref 4–5.4)
SARS-COV-2 RDRP RESP QL NAA+PROBE: NEGATIVE
WBC # BLD AUTO: 8.76 K/UL (ref 3.9–12.7)

## 2021-08-02 PROCEDURE — 63600175 PHARM REV CODE 636 W HCPCS: Performed by: OBSTETRICS & GYNECOLOGY

## 2021-08-02 PROCEDURE — 59400 OBSTETRICAL CARE: CPT | Mod: GB,,, | Performed by: OBSTETRICS & GYNECOLOGY

## 2021-08-02 PROCEDURE — 86900 BLOOD TYPING SEROLOGIC ABO: CPT | Performed by: OBSTETRICS & GYNECOLOGY

## 2021-08-02 PROCEDURE — U0002 COVID-19 LAB TEST NON-CDC: HCPCS | Performed by: OBSTETRICS & GYNECOLOGY

## 2021-08-02 PROCEDURE — 27200710 HC EPIDURAL INFUSION PUMP SET: Performed by: ANESTHESIOLOGY

## 2021-08-02 PROCEDURE — 51702 INSERT TEMP BLADDER CATH: CPT

## 2021-08-02 PROCEDURE — 63600175 PHARM REV CODE 636 W HCPCS: Performed by: ANESTHESIOLOGY

## 2021-08-02 PROCEDURE — 85025 COMPLETE CBC W/AUTO DIFF WBC: CPT | Performed by: OBSTETRICS & GYNECOLOGY

## 2021-08-02 PROCEDURE — 63600175 PHARM REV CODE 636 W HCPCS: Performed by: NURSE ANESTHETIST, CERTIFIED REGISTERED

## 2021-08-02 PROCEDURE — 25000003 PHARM REV CODE 250: Performed by: ANESTHESIOLOGY

## 2021-08-02 PROCEDURE — 62326 NJX INTERLAMINAR LMBR/SAC: CPT | Performed by: ANESTHESIOLOGY

## 2021-08-02 PROCEDURE — 11000001 HC ACUTE MED/SURG PRIVATE ROOM

## 2021-08-02 PROCEDURE — 59400 OBSTETRICAL CARE: CPT | Mod: AA,,, | Performed by: ANESTHESIOLOGY

## 2021-08-02 PROCEDURE — 25000003 PHARM REV CODE 250: Performed by: OBSTETRICS & GYNECOLOGY

## 2021-08-02 PROCEDURE — 51701 INSERT BLADDER CATHETER: CPT

## 2021-08-02 PROCEDURE — 72100002 HC LABOR CARE, 1ST 8 HOURS

## 2021-08-02 PROCEDURE — 59400 PR FULL ROUT OBSTE CARE,VAGINAL DELIV: ICD-10-PCS | Mod: GB,,, | Performed by: OBSTETRICS & GYNECOLOGY

## 2021-08-02 PROCEDURE — 72100003 HC LABOR CARE, EA. ADDL. 8 HRS

## 2021-08-02 PROCEDURE — 72200005 HC VAGINAL DELIVERY LEVEL II

## 2021-08-02 PROCEDURE — C1751 CATH, INF, PER/CENT/MIDLINE: HCPCS | Performed by: ANESTHESIOLOGY

## 2021-08-02 PROCEDURE — 59400 PRA FULL ROUT OBSTE CARE,VAGINAL DELIV: ICD-10-PCS | Mod: AA,,, | Performed by: ANESTHESIOLOGY

## 2021-08-02 RX ORDER — TERBUTALINE SULFATE 1 MG/ML
0.25 INJECTION SUBCUTANEOUS
Status: DISCONTINUED | OUTPATIENT
Start: 2021-08-02 | End: 2021-08-02

## 2021-08-02 RX ORDER — FENTANYL CITRATE 50 UG/ML
INJECTION, SOLUTION INTRAMUSCULAR; INTRAVENOUS
Status: DISCONTINUED | OUTPATIENT
Start: 2021-08-02 | End: 2021-08-02

## 2021-08-02 RX ORDER — TRANEXAMIC ACID 100 MG/ML
1000 INJECTION, SOLUTION INTRAVENOUS ONCE AS NEEDED
Status: DISCONTINUED | OUTPATIENT
Start: 2021-08-02 | End: 2021-08-02

## 2021-08-02 RX ORDER — BUPIVACAINE HYDROCHLORIDE 2.5 MG/ML
INJECTION, SOLUTION INFILTRATION; PERINEURAL
Status: DISCONTINUED | OUTPATIENT
Start: 2021-08-02 | End: 2021-08-02

## 2021-08-02 RX ORDER — IBUPROFEN 600 MG/1
600 TABLET ORAL EVERY 6 HOURS
Status: DISCONTINUED | OUTPATIENT
Start: 2021-08-03 | End: 2021-08-04 | Stop reason: HOSPADM

## 2021-08-02 RX ORDER — ONDANSETRON 8 MG/1
8 TABLET, ORALLY DISINTEGRATING ORAL EVERY 8 HOURS PRN
Status: DISCONTINUED | OUTPATIENT
Start: 2021-08-02 | End: 2021-08-02

## 2021-08-02 RX ORDER — MISOPROSTOL 100 MCG
25 TABLET ORAL EVERY 4 HOURS PRN
Status: DISCONTINUED | OUTPATIENT
Start: 2021-08-02 | End: 2021-08-02

## 2021-08-02 RX ORDER — MUPIROCIN 20 MG/G
OINTMENT TOPICAL
Status: DISCONTINUED | OUTPATIENT
Start: 2021-08-02 | End: 2021-08-02

## 2021-08-02 RX ORDER — SIMETHICONE 80 MG
1 TABLET,CHEWABLE ORAL EVERY 6 HOURS PRN
Status: DISCONTINUED | OUTPATIENT
Start: 2021-08-02 | End: 2021-08-04 | Stop reason: HOSPADM

## 2021-08-02 RX ORDER — PROCHLORPERAZINE EDISYLATE 5 MG/ML
5 INJECTION INTRAMUSCULAR; INTRAVENOUS EVERY 6 HOURS PRN
Status: DISCONTINUED | OUTPATIENT
Start: 2021-08-02 | End: 2021-08-04 | Stop reason: HOSPADM

## 2021-08-02 RX ORDER — DIPHENOXYLATE HYDROCHLORIDE AND ATROPINE SULFATE 2.5; .025 MG/1; MG/1
1 TABLET ORAL 4 TIMES DAILY PRN
Status: DISCONTINUED | OUTPATIENT
Start: 2021-08-02 | End: 2021-08-02

## 2021-08-02 RX ORDER — SODIUM CHLORIDE, SODIUM LACTATE, POTASSIUM CHLORIDE, CALCIUM CHLORIDE 600; 310; 30; 20 MG/100ML; MG/100ML; MG/100ML; MG/100ML
INJECTION, SOLUTION INTRAVENOUS CONTINUOUS
Status: DISCONTINUED | OUTPATIENT
Start: 2021-08-02 | End: 2021-08-02

## 2021-08-02 RX ORDER — DIPHENHYDRAMINE HYDROCHLORIDE 50 MG/ML
25 INJECTION INTRAMUSCULAR; INTRAVENOUS EVERY 4 HOURS PRN
Status: DISCONTINUED | OUTPATIENT
Start: 2021-08-02 | End: 2021-08-04 | Stop reason: HOSPADM

## 2021-08-02 RX ORDER — PRENATAL WITH FERROUS FUM AND FOLIC ACID 3080; 920; 120; 400; 22; 1.84; 3; 20; 10; 1; 12; 200; 27; 25; 2 [IU]/1; [IU]/1; MG/1; [IU]/1; MG/1; MG/1; MG/1; MG/1; MG/1; MG/1; UG/1; MG/1; MG/1; MG/1; MG/1
1 TABLET ORAL DAILY
Status: DISCONTINUED | OUTPATIENT
Start: 2021-08-03 | End: 2021-08-04 | Stop reason: HOSPADM

## 2021-08-02 RX ORDER — DIPHENHYDRAMINE HCL 25 MG
25 CAPSULE ORAL EVERY 4 HOURS PRN
Status: DISCONTINUED | OUTPATIENT
Start: 2021-08-02 | End: 2021-08-04 | Stop reason: HOSPADM

## 2021-08-02 RX ORDER — PHENYLEPHRINE HYDROCHLORIDE 10 MG/ML
INJECTION INTRAVENOUS
Status: DISCONTINUED | OUTPATIENT
Start: 2021-08-02 | End: 2021-08-02

## 2021-08-02 RX ORDER — OXYTOCIN/RINGER'S LACTATE 30/500 ML
0-30 PLASTIC BAG, INJECTION (ML) INTRAVENOUS CONTINUOUS
Status: DISCONTINUED | OUTPATIENT
Start: 2021-08-02 | End: 2021-08-02

## 2021-08-02 RX ORDER — ROPIVACAINE HYDROCHLORIDE 2 MG/ML
INJECTION, SOLUTION EPIDURAL; INFILTRATION; PERINEURAL CONTINUOUS PRN
Status: DISCONTINUED | OUTPATIENT
Start: 2021-08-02 | End: 2021-08-02

## 2021-08-02 RX ORDER — METHYLERGONOVINE MALEATE 0.2 MG/ML
200 INJECTION INTRAVENOUS
Status: DISCONTINUED | OUTPATIENT
Start: 2021-08-02 | End: 2021-08-02

## 2021-08-02 RX ORDER — SIMETHICONE 80 MG
1 TABLET,CHEWABLE ORAL 4 TIMES DAILY PRN
Status: DISCONTINUED | OUTPATIENT
Start: 2021-08-02 | End: 2021-08-02

## 2021-08-02 RX ORDER — HYDROCORTISONE 25 MG/G
CREAM TOPICAL 3 TIMES DAILY PRN
Status: DISCONTINUED | OUTPATIENT
Start: 2021-08-02 | End: 2021-08-04 | Stop reason: HOSPADM

## 2021-08-02 RX ORDER — ACETAMINOPHEN 325 MG/1
650 TABLET ORAL EVERY 6 HOURS PRN
Status: DISCONTINUED | OUTPATIENT
Start: 2021-08-02 | End: 2021-08-04 | Stop reason: HOSPADM

## 2021-08-02 RX ORDER — DOCUSATE SODIUM 100 MG/1
200 CAPSULE, LIQUID FILLED ORAL 2 TIMES DAILY PRN
Status: DISCONTINUED | OUTPATIENT
Start: 2021-08-02 | End: 2021-08-04 | Stop reason: HOSPADM

## 2021-08-02 RX ORDER — SODIUM CHLORIDE 0.9 % (FLUSH) 0.9 %
10 SYRINGE (ML) INJECTION
Status: DISCONTINUED | OUTPATIENT
Start: 2021-08-02 | End: 2021-08-04 | Stop reason: HOSPADM

## 2021-08-02 RX ORDER — ONDANSETRON 8 MG/1
8 TABLET, ORALLY DISINTEGRATING ORAL EVERY 8 HOURS PRN
Status: DISCONTINUED | OUTPATIENT
Start: 2021-08-02 | End: 2021-08-04 | Stop reason: HOSPADM

## 2021-08-02 RX ORDER — OXYTOCIN/RINGER'S LACTATE 30/500 ML
95 PLASTIC BAG, INJECTION (ML) INTRAVENOUS ONCE
Status: DISCONTINUED | OUTPATIENT
Start: 2021-08-02 | End: 2021-08-04 | Stop reason: HOSPADM

## 2021-08-02 RX ORDER — CARBOPROST TROMETHAMINE 250 UG/ML
250 INJECTION, SOLUTION INTRAMUSCULAR
Status: DISCONTINUED | OUTPATIENT
Start: 2021-08-02 | End: 2021-08-02

## 2021-08-02 RX ORDER — CALCIUM CARBONATE 200(500)MG
500 TABLET,CHEWABLE ORAL 3 TIMES DAILY PRN
Status: DISCONTINUED | OUTPATIENT
Start: 2021-08-02 | End: 2021-08-02

## 2021-08-02 RX ORDER — HYDROCODONE BITARTRATE AND ACETAMINOPHEN 5; 325 MG/1; MG/1
1 TABLET ORAL EVERY 4 HOURS PRN
Status: DISCONTINUED | OUTPATIENT
Start: 2021-08-02 | End: 2021-08-04 | Stop reason: HOSPADM

## 2021-08-02 RX ORDER — PROCHLORPERAZINE EDISYLATE 5 MG/ML
5 INJECTION INTRAMUSCULAR; INTRAVENOUS EVERY 6 HOURS PRN
Status: DISCONTINUED | OUTPATIENT
Start: 2021-08-02 | End: 2021-08-02

## 2021-08-02 RX ORDER — BUTORPHANOL TARTRATE 1 MG/ML
1 INJECTION INTRAMUSCULAR; INTRAVENOUS
Status: DISCONTINUED | OUTPATIENT
Start: 2021-08-02 | End: 2021-08-02

## 2021-08-02 RX ORDER — MISOPROSTOL 100 MCG
50 TABLET ORAL EVERY 4 HOURS
Status: DISCONTINUED | OUTPATIENT
Start: 2021-08-02 | End: 2021-08-02

## 2021-08-02 RX ORDER — OXYTOCIN/RINGER'S LACTATE 30/500 ML
334 PLASTIC BAG, INJECTION (ML) INTRAVENOUS ONCE
Status: DISCONTINUED | OUTPATIENT
Start: 2021-08-02 | End: 2021-08-02

## 2021-08-02 RX ORDER — OXYTOCIN/RINGER'S LACTATE 30/500 ML
95 PLASTIC BAG, INJECTION (ML) INTRAVENOUS ONCE
Status: DISCONTINUED | OUTPATIENT
Start: 2021-08-02 | End: 2021-08-02

## 2021-08-02 RX ORDER — MISOPROSTOL 200 UG/1
800 TABLET ORAL
Status: DISCONTINUED | OUTPATIENT
Start: 2021-08-02 | End: 2021-08-02

## 2021-08-02 RX ADMIN — AMPICILLIN SODIUM 1 G: 1 INJECTION, POWDER, FOR SOLUTION INTRAMUSCULAR; INTRAVENOUS at 05:08

## 2021-08-02 RX ADMIN — ROPIVACAINE HYDROCHLORIDE 7 ML/HR: 2 INJECTION, SOLUTION EPIDURAL; INFILTRATION at 11:08

## 2021-08-02 RX ADMIN — IBUPROFEN 600 MG: 600 TABLET ORAL at 11:08

## 2021-08-02 RX ADMIN — PHENYLEPHRINE HYDROCHLORIDE 200 MCG: 10 INJECTION INTRAVENOUS at 03:08

## 2021-08-02 RX ADMIN — ONDANSETRON 8 MG: 8 TABLET, ORALLY DISINTEGRATING ORAL at 07:08

## 2021-08-02 RX ADMIN — Medication 2 MILLI-UNITS/MIN: at 01:08

## 2021-08-02 RX ADMIN — AMPICILLIN SODIUM 2 G: 2 INJECTION, POWDER, FOR SOLUTION INTRAMUSCULAR; INTRAVENOUS at 01:08

## 2021-08-02 RX ADMIN — PHENYLEPHRINE HYDROCHLORIDE 200 MCG: 10 INJECTION INTRAVENOUS at 01:08

## 2021-08-02 RX ADMIN — CEFTRIAXONE 1 G: 1 INJECTION, SOLUTION INTRAVENOUS at 07:08

## 2021-08-02 RX ADMIN — FENTANYL CITRATE 100 MCG: 50 INJECTION, SOLUTION INTRAMUSCULAR; INTRAVENOUS at 11:08

## 2021-08-02 RX ADMIN — HYDROCODONE BITARTRATE AND ACETAMINOPHEN 1 TABLET: 5; 325 TABLET ORAL at 08:08

## 2021-08-02 RX ADMIN — SODIUM CHLORIDE, SODIUM LACTATE, POTASSIUM CHLORIDE, AND CALCIUM CHLORIDE: .6; .31; .03; .02 INJECTION, SOLUTION INTRAVENOUS at 03:08

## 2021-08-02 RX ADMIN — MISOPROSTOL 50 MCG: 100 TABLET ORAL at 03:08

## 2021-08-02 RX ADMIN — MISOPROSTOL 50 MCG: 100 TABLET ORAL at 07:08

## 2021-08-02 RX ADMIN — BUPIVACAINE HYDROCHLORIDE 6 ML: 2.5 INJECTION, SOLUTION INFILTRATION; PERINEURAL at 11:08

## 2021-08-02 RX ADMIN — SODIUM CHLORIDE, SODIUM LACTATE, POTASSIUM CHLORIDE, AND CALCIUM CHLORIDE: .6; .31; .03; .02 INJECTION, SOLUTION INTRAVENOUS at 08:08

## 2021-08-02 RX ADMIN — SODIUM CHLORIDE, SODIUM LACTATE, POTASSIUM CHLORIDE, AND CALCIUM CHLORIDE: .6; .31; .03; .02 INJECTION, SOLUTION INTRAVENOUS at 12:08

## 2021-08-03 ENCOUNTER — TELEPHONE (OUTPATIENT)
Dept: PEDIATRIC CARDIOLOGY | Facility: HOSPITAL | Age: 41
End: 2021-08-03

## 2021-08-03 ENCOUNTER — PATIENT MESSAGE (OUTPATIENT)
Dept: PEDIATRIC CARDIOLOGY | Facility: CLINIC | Age: 41
End: 2021-08-03

## 2021-08-03 LAB
BASOPHILS # BLD AUTO: 0.03 K/UL (ref 0–0.2)
BASOPHILS NFR BLD: 0.2 % (ref 0–1.9)
DIFFERENTIAL METHOD: ABNORMAL
EOSINOPHIL # BLD AUTO: 0.1 K/UL (ref 0–0.5)
EOSINOPHIL NFR BLD: 0.6 % (ref 0–8)
ERYTHROCYTE [DISTWIDTH] IN BLOOD BY AUTOMATED COUNT: 13 % (ref 11.5–14.5)
HCT VFR BLD AUTO: 34.7 % (ref 37–48.5)
HGB BLD-MCNC: 12.1 G/DL (ref 12–16)
IMM GRANULOCYTES # BLD AUTO: 0.1 K/UL (ref 0–0.04)
IMM GRANULOCYTES NFR BLD AUTO: 0.6 % (ref 0–0.5)
LYMPHOCYTES # BLD AUTO: 1.6 K/UL (ref 1–4.8)
LYMPHOCYTES NFR BLD: 10.3 % (ref 18–48)
MCH RBC QN AUTO: 34.1 PG (ref 27–31)
MCHC RBC AUTO-ENTMCNC: 34.9 G/DL (ref 32–36)
MCV RBC AUTO: 98 FL (ref 82–98)
MONOCYTES # BLD AUTO: 0.8 K/UL (ref 0.3–1)
MONOCYTES NFR BLD: 5.2 % (ref 4–15)
NEUTROPHILS # BLD AUTO: 12.9 K/UL (ref 1.8–7.7)
NEUTROPHILS NFR BLD: 83.1 % (ref 38–73)
NRBC BLD-RTO: 0 /100 WBC
PLATELET # BLD AUTO: 164 K/UL (ref 150–450)
PMV BLD AUTO: 11.1 FL (ref 9.2–12.9)
RBC # BLD AUTO: 3.55 M/UL (ref 4–5.4)
WBC # BLD AUTO: 15.51 K/UL (ref 3.9–12.7)

## 2021-08-03 PROCEDURE — 25000003 PHARM REV CODE 250: Performed by: OBSTETRICS & GYNECOLOGY

## 2021-08-03 PROCEDURE — 11000001 HC ACUTE MED/SURG PRIVATE ROOM

## 2021-08-03 PROCEDURE — 85025 COMPLETE CBC W/AUTO DIFF WBC: CPT | Performed by: OBSTETRICS & GYNECOLOGY

## 2021-08-03 PROCEDURE — 36415 COLL VENOUS BLD VENIPUNCTURE: CPT | Performed by: OBSTETRICS & GYNECOLOGY

## 2021-08-03 RX ADMIN — HYDROCODONE BITARTRATE AND ACETAMINOPHEN 1 TABLET: 5; 325 TABLET ORAL at 05:08

## 2021-08-03 RX ADMIN — IBUPROFEN 600 MG: 600 TABLET ORAL at 06:08

## 2021-08-03 RX ADMIN — HYDROCODONE BITARTRATE AND ACETAMINOPHEN 1 TABLET: 5; 325 TABLET ORAL at 07:08

## 2021-08-03 RX ADMIN — PRENATAL VIT W/ FE FUMARATE-FA TAB 27-0.8 MG 1 TABLET: 27-0.8 TAB at 09:08

## 2021-08-03 RX ADMIN — IBUPROFEN 600 MG: 600 TABLET ORAL at 01:08

## 2021-08-04 VITALS
SYSTOLIC BLOOD PRESSURE: 108 MMHG | BODY MASS INDEX: 29.99 KG/M2 | HEIGHT: 65 IN | RESPIRATION RATE: 18 BRPM | TEMPERATURE: 98 F | HEART RATE: 63 BPM | DIASTOLIC BLOOD PRESSURE: 62 MMHG | OXYGEN SATURATION: 98 % | WEIGHT: 180 LBS

## 2021-08-04 PROCEDURE — 25000003 PHARM REV CODE 250: Performed by: OBSTETRICS & GYNECOLOGY

## 2021-08-04 RX ORDER — IBUPROFEN 600 MG/1
600 TABLET ORAL EVERY 8 HOURS PRN
Qty: 30 TABLET | Refills: 1 | Status: SHIPPED | OUTPATIENT
Start: 2021-08-04 | End: 2023-07-20

## 2021-08-04 RX ORDER — OXYCODONE AND ACETAMINOPHEN 5; 325 MG/1; MG/1
1 TABLET ORAL EVERY 4 HOURS PRN
Qty: 8 TABLET | Refills: 0 | Status: SHIPPED | OUTPATIENT
Start: 2021-08-04 | End: 2023-07-20

## 2021-08-04 RX ADMIN — PRENATAL VIT W/ FE FUMARATE-FA TAB 27-0.8 MG 1 TABLET: 27-0.8 TAB at 09:08

## 2021-08-04 RX ADMIN — IBUPROFEN 600 MG: 600 TABLET ORAL at 05:08

## 2021-08-04 RX ADMIN — IBUPROFEN 600 MG: 600 TABLET ORAL at 11:08

## 2021-08-04 RX ADMIN — DOCUSATE SODIUM 200 MG: 100 CAPSULE ORAL at 09:08

## 2021-08-04 RX ADMIN — IBUPROFEN 600 MG: 600 TABLET ORAL at 12:08

## 2021-08-05 ENCOUNTER — PATIENT MESSAGE (OUTPATIENT)
Dept: PEDIATRIC CARDIOLOGY | Facility: CLINIC | Age: 41
End: 2021-08-05

## 2021-08-05 ENCOUNTER — PATIENT MESSAGE (OUTPATIENT)
Dept: OBSTETRICS AND GYNECOLOGY | Facility: CLINIC | Age: 41
End: 2021-08-05

## 2021-08-06 ENCOUNTER — TELEPHONE (OUTPATIENT)
Dept: OBSTETRICS AND GYNECOLOGY | Facility: HOSPITAL | Age: 41
End: 2021-08-06

## 2021-08-06 NOTE — TELEPHONE ENCOUNTER
Spoke to pt who states baby breastfeeding well at home -milk is in and baby having plenty of wet and dirty diapers -saw pediatrician and weight starting to come up -will follow-up again 8/11-pumped right side after feeding on left and got at least an ounce -denies any problems with breasts and plans to return hospital grade pump next week -no concerns for us

## 2021-08-10 ENCOUNTER — PATIENT MESSAGE (OUTPATIENT)
Dept: OBSTETRICS AND GYNECOLOGY | Facility: CLINIC | Age: 41
End: 2021-08-10

## 2021-08-12 ENCOUNTER — TELEPHONE (OUTPATIENT)
Dept: OBSTETRICS AND GYNECOLOGY | Facility: HOSPITAL | Age: 41
End: 2021-08-12

## 2021-08-16 ENCOUNTER — IMMUNIZATION (OUTPATIENT)
Dept: PHARMACY | Facility: CLINIC | Age: 41
End: 2021-08-16
Payer: COMMERCIAL

## 2021-08-16 DIAGNOSIS — Z23 NEED FOR VACCINATION: Primary | ICD-10-CM

## 2021-08-23 ENCOUNTER — PATIENT MESSAGE (OUTPATIENT)
Dept: ADMINISTRATIVE | Facility: OTHER | Age: 41
End: 2021-08-23

## 2021-08-26 ENCOUNTER — PATIENT MESSAGE (OUTPATIENT)
Dept: OBSTETRICS AND GYNECOLOGY | Facility: CLINIC | Age: 41
End: 2021-08-26

## 2021-09-14 PROBLEM — O23.41 GROUP B STREPTOCOCCUS URINARY TRACT INFECTION AFFECTING PREGNANCY IN FIRST TRIMESTER: Status: RESOLVED | Noted: 2021-01-09 | Resolved: 2021-09-14

## 2021-09-14 PROBLEM — Z3A.39 PREGNANCY WITH 39 COMPLETED WEEKS GESTATION: Status: RESOLVED | Noted: 2021-08-02 | Resolved: 2021-09-14

## 2021-09-14 PROBLEM — B95.1 GROUP B STREPTOCOCCUS URINARY TRACT INFECTION AFFECTING PREGNANCY IN FIRST TRIMESTER: Status: RESOLVED | Noted: 2021-01-09 | Resolved: 2021-09-14

## 2021-09-15 ENCOUNTER — POSTPARTUM VISIT (OUTPATIENT)
Dept: OBSTETRICS AND GYNECOLOGY | Facility: CLINIC | Age: 41
End: 2021-09-15
Payer: COMMERCIAL

## 2021-09-15 VITALS
WEIGHT: 159.63 LBS | DIASTOLIC BLOOD PRESSURE: 72 MMHG | SYSTOLIC BLOOD PRESSURE: 116 MMHG | HEIGHT: 65 IN | BODY MASS INDEX: 26.6 KG/M2

## 2021-09-15 DIAGNOSIS — Z30.011 ENCOUNTER FOR INITIAL PRESCRIPTION OF CONTRACEPTIVE PILLS: ICD-10-CM

## 2021-09-15 PROCEDURE — 99999 PR PBB SHADOW E&M-EST. PATIENT-LVL II: ICD-10-PCS | Mod: PBBFAC,,, | Performed by: OBSTETRICS & GYNECOLOGY

## 2021-09-15 PROCEDURE — 0503F POSTPARTUM CARE VISIT: CPT | Mod: CPTII,S$GLB,, | Performed by: OBSTETRICS & GYNECOLOGY

## 2021-09-15 PROCEDURE — 0503F PR POSTPARTUM CARE VISIT: ICD-10-PCS | Mod: CPTII,S$GLB,, | Performed by: OBSTETRICS & GYNECOLOGY

## 2021-09-15 PROCEDURE — 99999 PR PBB SHADOW E&M-EST. PATIENT-LVL II: CPT | Mod: PBBFAC,,, | Performed by: OBSTETRICS & GYNECOLOGY

## 2021-09-15 RX ORDER — DROSPIRENONE AND ETHINYL ESTRADIOL 0.03MG-3MG
1 KIT ORAL DAILY
Qty: 90 TABLET | Refills: 3 | Status: SHIPPED | OUTPATIENT
Start: 2021-09-15 | End: 2023-07-20

## 2022-03-08 ENCOUNTER — TELEPHONE (OUTPATIENT)
Dept: HEMATOLOGY/ONCOLOGY | Facility: CLINIC | Age: 42
End: 2022-03-08
Payer: COMMERCIAL

## 2022-03-10 ENCOUNTER — PATIENT MESSAGE (OUTPATIENT)
Dept: HEMATOLOGY/ONCOLOGY | Facility: CLINIC | Age: 42
End: 2022-03-10
Payer: COMMERCIAL

## 2022-03-10 ENCOUNTER — TELEPHONE (OUTPATIENT)
Dept: HEMATOLOGY/ONCOLOGY | Facility: CLINIC | Age: 42
End: 2022-03-10
Payer: COMMERCIAL

## 2022-03-10 NOTE — TELEPHONE ENCOUNTER
----- Message from Tiffanie Vega RN sent at 3/9/2022  4:33 PM CST -----  Farhat Moreno,     I saw this patient 3 years ago for hemochromatosis. She saw Dr Back once at that time as well. Patient just called for nodules in the liver. Can you call her to figure where the scans were done? Probably need to see Dr Back first for further workup.   ----- Message -----

## 2022-03-29 ENCOUNTER — TELEPHONE (OUTPATIENT)
Dept: VASCULAR SURGERY | Facility: CLINIC | Age: 42
End: 2022-03-29
Payer: COMMERCIAL

## 2022-03-29 ENCOUNTER — OFFICE VISIT (OUTPATIENT)
Dept: OBSTETRICS AND GYNECOLOGY | Facility: CLINIC | Age: 42
End: 2022-03-29
Payer: COMMERCIAL

## 2022-03-29 VITALS
WEIGHT: 136.69 LBS | SYSTOLIC BLOOD PRESSURE: 118 MMHG | DIASTOLIC BLOOD PRESSURE: 74 MMHG | BODY MASS INDEX: 22.75 KG/M2

## 2022-03-29 DIAGNOSIS — R10.30 LOWER ABDOMINAL PAIN: ICD-10-CM

## 2022-03-29 DIAGNOSIS — Z12.31 SCREENING MAMMOGRAM FOR HIGH-RISK PATIENT: ICD-10-CM

## 2022-03-29 DIAGNOSIS — Z01.419 VISIT FOR GYNECOLOGIC EXAMINATION: Primary | ICD-10-CM

## 2022-03-29 PROCEDURE — 99999 PR PBB SHADOW E&M-EST. PATIENT-LVL III: ICD-10-PCS | Mod: PBBFAC,,, | Performed by: OBSTETRICS & GYNECOLOGY

## 2022-03-29 PROCEDURE — 99999 PR PBB SHADOW E&M-EST. PATIENT-LVL III: CPT | Mod: PBBFAC,,, | Performed by: OBSTETRICS & GYNECOLOGY

## 2022-03-29 PROCEDURE — 1159F MED LIST DOCD IN RCRD: CPT | Mod: CPTII,S$GLB,, | Performed by: OBSTETRICS & GYNECOLOGY

## 2022-03-29 PROCEDURE — 3074F SYST BP LT 130 MM HG: CPT | Mod: CPTII,S$GLB,, | Performed by: OBSTETRICS & GYNECOLOGY

## 2022-03-29 PROCEDURE — 3078F DIAST BP <80 MM HG: CPT | Mod: CPTII,S$GLB,, | Performed by: OBSTETRICS & GYNECOLOGY

## 2022-03-29 PROCEDURE — 1159F PR MEDICATION LIST DOCUMENTED IN MEDICAL RECORD: ICD-10-PCS | Mod: CPTII,S$GLB,, | Performed by: OBSTETRICS & GYNECOLOGY

## 2022-03-29 PROCEDURE — 3008F PR BODY MASS INDEX (BMI) DOCUMENTED: ICD-10-PCS | Mod: CPTII,S$GLB,, | Performed by: OBSTETRICS & GYNECOLOGY

## 2022-03-29 PROCEDURE — 99396 PREV VISIT EST AGE 40-64: CPT | Mod: S$GLB,,, | Performed by: OBSTETRICS & GYNECOLOGY

## 2022-03-29 PROCEDURE — 3008F BODY MASS INDEX DOCD: CPT | Mod: CPTII,S$GLB,, | Performed by: OBSTETRICS & GYNECOLOGY

## 2022-03-29 PROCEDURE — 3074F PR MOST RECENT SYSTOLIC BLOOD PRESSURE < 130 MM HG: ICD-10-PCS | Mod: CPTII,S$GLB,, | Performed by: OBSTETRICS & GYNECOLOGY

## 2022-03-29 PROCEDURE — 99396 PR PREVENTIVE VISIT,EST,40-64: ICD-10-PCS | Mod: S$GLB,,, | Performed by: OBSTETRICS & GYNECOLOGY

## 2022-03-29 PROCEDURE — 3078F PR MOST RECENT DIASTOLIC BLOOD PRESSURE < 80 MM HG: ICD-10-PCS | Mod: CPTII,S$GLB,, | Performed by: OBSTETRICS & GYNECOLOGY

## 2022-03-29 RX ORDER — ALPRAZOLAM 0.5 MG/1
0.5 TABLET ORAL DAILY PRN
COMMUNITY
Start: 2022-02-07

## 2022-03-29 NOTE — PROGRESS NOTES
HISTORY OF PRESENT ILLNESS:    Danielle Garcia is a 41 y.o. female, , Patient's last menstrual period was 2022 (approximate).,  presents for a routine exam and has no complaints.  COTEST PLANNED .  VASECTOMY FOR CONTRACEPTION.  HAD NL MAMMO 2022  HAD BILATERAL LOWER ABDOMINAL PAIN FOLLOWING INTERCOURSE RECENTLY, AND COINCIDED WITH MIDCYCLE - POSS OVULATORY SOURCE.  NO PAIN NOTED WITH INTERCOURSE.  REASSURED NO SIGNIFICANT CYSTOCELE OR RECTOCELE, AND NO DESCENSUS.  EXAM WITHOUT PELVIC MASS, SO NO LIKELY RESIDUAL OVARY CYST.  HAS BEEN UNDERGOING EVALUATION WITH NEUROLOGY FOR RIGHT SIDE OF HER BODY FEELS DIFFERENT .  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  WILLY NOW 1YO AND DEAN FINALLY SLEEPING THRU NIGHT, VSD ASYMPTOMATIC.     PP:  status post  6 weeks ago.  Her hospitalization was not complicated.  She is breastfeeding - HAS JUST WEAN.  She desires vasectomy for contraception.  She denies postpartum depression.  RECEIVED COVID VACC PP AND DEAN'S VSD MODERATE, AND STILL HOPING FOR SPONTANEOUS CLOSURE.  WILL PRESCRIBE AYDEN IS NEEDED UNTIL VASECTOMY CLEARED    2021  MALE, FIRST DEGREE LAC REPAIR AND CORD BLOOD COLLECTED  Her last pap was 2020  HEMOCHOMATOSIS, O POS  AMA 38YO, SHORT INTERVAL (DELIV JOSE D 2019), STOPPED T1 ADDERRAL, WELLBUT.    HX VSD REPAIR 1YO -- - FETAL VSD - -  NIPT NL XY. GBS URINE T1.  REF  BRUNNER  'DEAN', 37w0D   78th%, AC 99TH, MILD POLY RESOLVED    Past Medical History:   Diagnosis Date    Hemochromatosis        Past Surgical History:   Procedure Laterality Date    KNEE SURGERY Right 2018    VSD REPAIR      1yo ()       MEDICATIONS AND ALLERGIES:      Current Outpatient Medications:     ALPRAZolam (XANAX) 0.5 MG tablet, Take 0.5 mg by mouth daily as needed., Disp: , Rfl:     dextroamphetamine-amphetamine (ADDERALL) 20 mg tablet, TAKE 1 TABLET BY MOUTH TWICE DAILY TO  CONTROL ADD, Disp: , Rfl:     valACYclovir (VALTREX) 500 MG tablet, PRN, Disp: , Rfl: 0    drospirenone-ethinyl estradioL (AYDEN, 28,) 3-0.03 mg per tablet, Take 1 tablet by mouth once daily., Disp: 90 tablet, Rfl: 3    ibuprofen (ADVIL,MOTRIN) 600 MG tablet, Take 1 tablet (600 mg total) by mouth every 8 (eight) hours as needed for Pain., Disp: 30 tablet, Rfl: 1    oxyCODONE-acetaminophen (PERCOCET) 5-325 mg per tablet, Take 1 tablet by mouth every 4 (four) hours as needed for Pain., Disp: 8 tablet, Rfl: 0    Review of patient's allergies indicates:  No Known Allergies    Family History   Problem Relation Age of Onset    Breast cancer Neg Hx     Colon cancer Neg Hx     Ovarian cancer Neg Hx     Arrhythmia Neg Hx     Cardiomyopathy Neg Hx     Congenital heart disease Neg Hx     Early death Neg Hx     Heart attacks under age 50 Neg Hx     Hypertension Neg Hx     Long QT syndrome Neg Hx     Pacemaker/defibrilator Neg Hx        Social History     Socioeconomic History    Marital status:    Tobacco Use    Smoking status: Never Smoker    Smokeless tobacco: Never Used   Substance and Sexual Activity    Alcohol use: Not Currently    Drug use: Never    Sexual activity: Yes     Partners: Male     Birth control/protection: None       COMPREHENSIVE GYN HISTORY:  PAP History: Denies abnormal Paps.  Infection History: Denies STDs. Denies PID.  Benign History: Denies uterine fibroids. Denies ovarian cysts. Denies endometriosis. Denies other conditions.  Cancer History: Denies cervical cancer. Denies uterine cancer or hyperplasia. Denies ovarian cancer. Denies vulvar cancer or pre-cancer. Denies vaginal cancer or pre-cancer. Denies breast cancer. Denies colon cancer.  Sexual Activity History: Reports currently being sexually active  Menstrual History: Monthly, mild-moderate.  Contraception:vasectomy    ROS:  GENERAL: No weight changes. No swelling. No fatigue. No fever.  CARDIOVASCULAR: No chest pain.  No shortness of breath. No leg cramps.   NEUROLOGICAL: No headaches. No vision changes.  BREASTS: No pain. No lumps. No discharge.  ABDOMEN: No pain. No nausea. No vomiting. No diarrhea. No constipation.  REPRODUCTIVE: No abnormal bleeding.   VULVA: No pain. No lesions. No itching.  VAGINA: No relaxation. No itching. No odor. No discharge. No lesions.  URINARY: No incontinence. No nocturia. No frequency. No dysuria.    /74   Wt 62 kg (136 lb 11 oz)   LMP 03/07/2022 (Approximate)   BMI 22.75 kg/m²     PE:  APPEARANCE: Well nourished, well developed, in no acute distress.  AFFECT: WNL, alert and oriented x 3.  SKIN: No acne or hirsutism.  NECK: Neck symmetric, without masses or thyromegaly.  NODES: No inguinal, cervical, axillary or femoral lymph node enlargement.  CHEST: Good respiratory effort.   ABDOMEN: Soft. No tenderness or masses. No hepatosplenomegaly. No hernias.  BREASTS: Symmetrical, no skin changes, visible lesions, palpable masses or nipple discharge bilaterally.  PELVIC: External female genitalia without lesions.  Female hair distribution. Adequate perineal body, Normal urethral meatus. Vagina moist and well rugated without lesions or discharge.  No significant cystocele or rectocele present. Cervix pink without lesions, discharge or tenderness. Uterus is normal size, regular, mobile and nontender. Adnexa without masses or tenderness.  EXTREMITIES: No edema    PROCEDURES:      DIAGNOSIS:  1. Visit for gynecologic examination     2. Lower abdominal pain     3. Screening mammogram for high-risk patient  CANCELED: Mammo Digital Screening Bilat w/ Carlos       LABS AND TESTS ORDERED:    MEDICATIONS PRESCRIBED:    COUNSELING:   The patient was counseled today on ACS PAP guidelines, with recommendations for yearly pelvic exams unless their uterus, cervix, and ovaries were removed for benign reasons; in that case, examinations every 3-5 years are recommended.  The patient was also counseled regarding  monthly breast self-examination, routine STD screening for at-risk populations, prophylactic immunizations for transmitted infections such as  HPV, Pertussis, or Influenza as appropriate, and yearly mammograms when indicated by ACS guidelines.  She was advised to see her primary care physician for all other health maintenance.    FOLLOW-UP with me annually.

## 2022-03-29 NOTE — TELEPHONE ENCOUNTER
Received message that pt would like to schedule an appointment with Dr. Campbell. M for pt attempting to schedule appointment

## 2022-03-30 ENCOUNTER — PATIENT MESSAGE (OUTPATIENT)
Dept: CARDIOLOGY | Facility: CLINIC | Age: 42
End: 2022-03-30
Payer: COMMERCIAL

## 2022-04-08 ENCOUNTER — OFFICE VISIT (OUTPATIENT)
Dept: HEPATOLOGY | Facility: CLINIC | Age: 42
End: 2022-04-08
Payer: COMMERCIAL

## 2022-04-08 VITALS
HEIGHT: 65 IN | TEMPERATURE: 98 F | SYSTOLIC BLOOD PRESSURE: 110 MMHG | BODY MASS INDEX: 23.2 KG/M2 | HEART RATE: 90 BPM | WEIGHT: 139.25 LBS | OXYGEN SATURATION: 99 % | RESPIRATION RATE: 17 BRPM | DIASTOLIC BLOOD PRESSURE: 67 MMHG

## 2022-04-08 DIAGNOSIS — K76.9 LIVER LESION: ICD-10-CM

## 2022-04-08 DIAGNOSIS — E83.110 HEREDITARY HEMOCHROMATOSIS: Primary | ICD-10-CM

## 2022-04-08 PROCEDURE — 1159F PR MEDICATION LIST DOCUMENTED IN MEDICAL RECORD: ICD-10-PCS | Mod: CPTII,S$GLB,, | Performed by: INTERNAL MEDICINE

## 2022-04-08 PROCEDURE — 3074F PR MOST RECENT SYSTOLIC BLOOD PRESSURE < 130 MM HG: ICD-10-PCS | Mod: CPTII,S$GLB,, | Performed by: INTERNAL MEDICINE

## 2022-04-08 PROCEDURE — 99999 PR PBB SHADOW E&M-EST. PATIENT-LVL V: ICD-10-PCS | Mod: PBBFAC,,, | Performed by: INTERNAL MEDICINE

## 2022-04-08 PROCEDURE — 1159F MED LIST DOCD IN RCRD: CPT | Mod: CPTII,S$GLB,, | Performed by: INTERNAL MEDICINE

## 2022-04-08 PROCEDURE — 3008F PR BODY MASS INDEX (BMI) DOCUMENTED: ICD-10-PCS | Mod: CPTII,S$GLB,, | Performed by: INTERNAL MEDICINE

## 2022-04-08 PROCEDURE — 3078F PR MOST RECENT DIASTOLIC BLOOD PRESSURE < 80 MM HG: ICD-10-PCS | Mod: CPTII,S$GLB,, | Performed by: INTERNAL MEDICINE

## 2022-04-08 PROCEDURE — 99214 PR OFFICE/OUTPT VISIT, EST, LEVL IV, 30-39 MIN: ICD-10-PCS | Mod: S$GLB,,, | Performed by: INTERNAL MEDICINE

## 2022-04-08 PROCEDURE — 99214 OFFICE O/P EST MOD 30 MIN: CPT | Mod: S$GLB,,, | Performed by: INTERNAL MEDICINE

## 2022-04-08 PROCEDURE — 3074F SYST BP LT 130 MM HG: CPT | Mod: CPTII,S$GLB,, | Performed by: INTERNAL MEDICINE

## 2022-04-08 PROCEDURE — 3078F DIAST BP <80 MM HG: CPT | Mod: CPTII,S$GLB,, | Performed by: INTERNAL MEDICINE

## 2022-04-08 PROCEDURE — 3008F BODY MASS INDEX DOCD: CPT | Mod: CPTII,S$GLB,, | Performed by: INTERNAL MEDICINE

## 2022-04-08 PROCEDURE — 99999 PR PBB SHADOW E&M-EST. PATIENT-LVL V: CPT | Mod: PBBFAC,,, | Performed by: INTERNAL MEDICINE

## 2022-04-08 RX ORDER — ZOLPIDEM TARTRATE 10 MG/1
5 TABLET ORAL NIGHTLY PRN
COMMUNITY
End: 2023-07-20

## 2022-04-08 RX ORDER — ASPIRIN 325 MG
50000 TABLET, DELAYED RELEASE (ENTERIC COATED) ORAL
COMMUNITY
End: 2023-07-20

## 2022-04-08 RX ORDER — LANOLIN ALCOHOL/MO/W.PET/CERES
1 CREAM (GRAM) TOPICAL 2 TIMES DAILY
COMMUNITY
Start: 2022-03-25 | End: 2023-07-20

## 2022-04-08 NOTE — PATIENT INSTRUCTIONS
Get US and ct [facility]  MRI now and MRE now  Pregnancy test 2 days prior to MRIs  Phlebotomy monthly with labs monthly before phlebotomy   to get tested for HH

## 2022-04-08 NOTE — PROGRESS NOTES
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  At the time she was a 39 y.o. female who was found out to have an elevated ferritin in the 300-400s range. 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 She was treated with phlebotomy and initially was phlebotomized once weekly. She last underwent phlebotomy in March 2019. It sounds like she only underwent phlebotomy 6 times before she became iron deficient.  Other data:  --2012 ALT and AST 22/22  --MRI abdomen 2012: Findings suggest a mild degree of increased density in the liver, which  can be seen with iron deposition.  --ferritin at the time 272  -- alpha1 antitrypsin level 136 in 2012  -78% iron saturation in 2013 2015 liver biopsy (may have been done after she started phlebotomy)  The perivenular sinusoidal dilatation suggests possible passive congestion. The pattern of the spotty lobular hepatitis is not specific and may represent drug/toxin-induced liver injury. CLINICAL HISTORY Hepatomegaly. Homozygous C282Y mutation, has received therapeutic phlebotomy. Heterozygous alpha-1 antitrypsin phenotype PiM2S with normal enzyme protein levels. Intermittent borderline elevations of AMA M2. SPECIMEN SOURCE Right lobe liver biopsy, ultrasound guided by Dr. Mcdonough GROSS DESCRIPTION The specimen is received in formalin, is labeled with the patient's information and "right lobeliver biopsy" and consists of three cores of light brown soft tissue, ranging in length from 1.8 to 2 cm, entirely submitted in cassette A1. CG/pl/RLS MICROSCOPIC The liver biopsy includes 35 portal areas, most of which appear normal. Focal minimal portal inflammation " consists of small lymphocytes, with rare plasma cells and eosinophils. The interlobular bile ducts demonstrate normal morphology. Focal cholangiolar proliferation is   minimal. No portal fibrosis is evident (trichrome).     The lobules contain rare acidophil bodies, associated with minimal lymphohistiocytic   inflammation. The hepatocytes contain a mild amount of lipofuscin. Macrovesicular steatosis is   minimal and involves less than 5% of the hepatocytes. Rare hepatocytes demonstrate minimal   stainable iron (0-1+; Perls). No ballooning degeneration or alpha-1 antitrypsin accumulation   (PAS with diastase) is identified.      5/1/19 labs: ALT 21, AST 23, ALKP 65, Tbil 0.6  Ferritin 14 7/24/19 and 25 8/29/19 with iron sat of 10 % and 48% respectively.    My impression at the time:  She definitely has the genetic defect for hereditary hemochromatosis. In 2012 her liver enzymes were normal at a time when there was only a modestly elevated ferritin, but MRI did suggest some iron overload. Subsequent liver biopsy did not show significant iron. However, the biopsy may have been done after she initiated phlebotomy which would explain the lack of iron. Of note, she was not phlebotomized many times before she became anemic which would suggest she was not very iron overloaded. She likely has hemochromatosis. I wanted to review her records from [place] including the liver biopsy, but was unable to obtain records.    Interval History  Since Danielle STAR Garcia's last visit:    Delivered 2 children without complication. Has had ~2-3 phelbotomies. Feeling well. The patient denies any symptoms of decompensated cirrhosis, including no ascites or edema, cognitive problems that would suggest hepatic encephalopathy, or GI bleeding from varices.    Labs 3/22/22: ALT 23, AST 29, ALKP 72, Tbil 0.3, hemoglobin 13.8  2/22/22: iron sat 96%, ferritin 102    CT abdo/pelvis TP 4/7/22: The liver capsule smooth. The parenchyma is homogeneous.  Scattered hypodensities are identified throughout the liver which are too small to characterize. Also several hypodense lesions are identified which likely demonstrate peripheral enhancement and become isodense on delayed imaging suggestive of hemangiomas  Abdo US 2/22/2022: The liver is nonenlarged. There is a 7 mm hyperechoic nodule in the mid right hepatic lobe. There is a poorly defined heterogeneous lesion in the anterior right hepatic lobe measuring 3.3 x 2.9 x 2.6 cm. There is no biliary dilatation. There is a 10 mm hyperechoic nodule in the right hepatic lobe. The main portal vein is patent with hepatopedal flow. The CBD measures 2 mm.    Abdo US 12/2017: Liver: Size: 16.1 cm in the right midclavicular line, normal; Appearance: Mildly coarse echogenicity, smooth contour;  Mass: 0.7 x 0.9 x 0.9 cm hyperechoic lesion in the right hepatic lobe.   MRI w wo conrast 3/6/2015: The liver appears to be enlarged with the inferior taper reaching the inferior pole of the right kidney near the level of the right iliac bone. Signal intensity from the liver is slightly more hyperintense   on T1 out of phase when compare with the T1 in phase (series 902, versus series 901).  Focal hyperintense lesion on T2 measuring 8 mm in diameter within segment 8, demonstrate restriction diffusion, and progressively   filling in on postcontrast examination.           Outpatient Encounter Medications as of 4/8/2022   Medication Sig Dispense Refill    dextroamphetamine-amphetamine (ADDERALL) 20 mg tablet TAKE 1 TABLET BY MOUTH TWICE DAILY TO CONTROL ADD      ALPRAZolam (XANAX) 0.5 MG tablet Take 0.5 mg by mouth daily as needed.      drospirenone-ethinyl estradioL (AYDEN, 28,) 3-0.03 mg per tablet Take 1 tablet by mouth once daily. 90 tablet 3    ibuprofen (ADVIL,MOTRIN) 600 MG tablet Take 1 tablet (600 mg total) by mouth every 8 (eight) hours as needed for Pain. 30 tablet 1    magnesium oxide (MAG-OX) 400 mg (241.3 mg magnesium)  tablet Take 1 tablet by mouth 2 (two) times daily.      oxyCODONE-acetaminophen (PERCOCET) 5-325 mg per tablet Take 1 tablet by mouth every 4 (four) hours as needed for Pain. 8 tablet 0    valACYclovir (VALTREX) 500 MG tablet PRN  0     No facility-administered encounter medications on file as of 4/8/2022.     Review of patient's allergies indicates:  No Known Allergies  Past Medical History:   Diagnosis Date    Hemochromatosis        Review of Systems   Constitutional: Negative.    HENT: Negative.    Eyes: Negative.    Respiratory: Negative.    Cardiovascular: Negative.    Gastrointestinal: Negative.    Genitourinary: Negative.    Musculoskeletal: Negative.    Skin: Negative.    Neurological: Negative.    Psychiatric/Behavioral: Negative.      Vitals:    04/08/22 1525   BP: 110/67   Pulse: 90   Resp: 17   Temp: 98.2 °F (36.8 °C)       Physical Exam  Vitals reviewed.   Constitutional:       Appearance: She is well-developed.   HENT:      Head: Normocephalic and atraumatic.   Eyes:      General: No scleral icterus.     Conjunctiva/sclera: Conjunctivae normal.      Pupils: Pupils are equal, round, and reactive to light.   Neck:      Thyroid: No thyromegaly.   Cardiovascular:      Rate and Rhythm: Normal rate and regular rhythm.      Heart sounds: Normal heart sounds.   Pulmonary:      Effort: Pulmonary effort is normal.      Breath sounds: Normal breath sounds. No rales.   Abdominal:      General: Bowel sounds are normal. There is no distension.      Palpations: Abdomen is soft. There is no mass.      Tenderness: There is no abdominal tenderness.   Musculoskeletal:         General: Normal range of motion.      Cervical back: Normal range of motion and neck supple.   Skin:     General: Skin is warm and dry.      Findings: No rash.   Neurological:      Mental Status: She is alert and oriented to person, place, and time.         Computed MELD-Na score unavailable. Necessary lab results were not found in the last  year.  Computed MELD score unavailable. Necessary lab results were not found in the last year.    Lab Results   Component Value Date     10/29/2019    BUN 4 (L) 10/29/2019    CREATININE 0.6 10/29/2019    CALCIUM 9.0 10/29/2019     (L) 10/29/2019    K 3.9 10/29/2019     10/29/2019    PROT 6.4 10/29/2019    CO2 21 (L) 10/29/2019    ANIONGAP 9 10/29/2019    WBC 15.51 (H) 08/03/2021    RBC 3.55 (L) 08/03/2021    HGB 12.1 08/03/2021    HCT 34.7 (L) 08/03/2021    MCV 98 08/03/2021    MCH 34.1 (H) 08/03/2021    MCHC 34.9 08/03/2021     Lab Results   Component Value Date    RDW 13.0 08/03/2021     08/03/2021    MPV 11.1 08/03/2021    GRAN 12.9 (H) 08/03/2021    GRAN 83.1 (H) 08/03/2021    LYMPH 1.6 08/03/2021    LYMPH 10.3 (L) 08/03/2021    MONO 0.8 08/03/2021    MONO 5.2 08/03/2021    EOSINOPHIL 0.6 08/03/2021    BASOPHIL 0.2 08/03/2021    EOS 0.1 08/03/2021    BASO 0.03 08/03/2021    GROUPTRH O POS 08/02/2021    GROUPTRH O POS 12/15/2020    ALBUMIN 2.9 (L) 10/29/2019    AST 20 10/29/2019    ALT 19 10/29/2019    ALKPHOS 116 10/29/2019       Assessment and Plan:  Danielle Garcia is a 41 y.o. female withHerediatary hemochromatosis  Her recent ferritin is above the threshold (ferritin <50) recommended and thus, I am recommending phlebotomy monthly prn to maintain ferritin <50. She will have a cbc to check for anemia and iron studies before each phlebotomy. I am ordering an MRE to screen for fibrosis/iron overload. I also suggested that her  be screened for HH to determine if she needs to be concerned that her childtren may get HH.    The liver lesion noted on most recent US appears to be new/has grown. I am recommending an MRI w wo eovist to further evaluate (she will have a pregnancy test 2 days prior to MRI). I will obtain CT scan and abod US done at [facility] for comparison

## 2022-04-09 PROBLEM — K76.9 LIVER LESION: Status: ACTIVE | Noted: 2022-04-09

## 2022-04-12 ENCOUNTER — TELEPHONE (OUTPATIENT)
Dept: HEPATOLOGY | Facility: CLINIC | Age: 42
End: 2022-04-12
Payer: COMMERCIAL

## 2022-04-12 NOTE — TELEPHONE ENCOUNTER
Dick Ayoub sent to [facility]ICH[facility] Graves Staff  Caller: Vee (Today,  8:24 AM)  [facility] is calling to ask if it would be preferred to PowerShare the images, that would be ready in about a half an hour rather than [employer].     Contact: [phone]       Returned the call spoke with Vee, confirming to yes Power Share is great!

## 2022-04-19 ENCOUNTER — TELEPHONE (OUTPATIENT)
Dept: HEMATOLOGY/ONCOLOGY | Facility: CLINIC | Age: 42
End: 2022-04-19
Payer: COMMERCIAL

## 2022-04-20 ENCOUNTER — PATIENT MESSAGE (OUTPATIENT)
Dept: HEMATOLOGY/ONCOLOGY | Facility: CLINIC | Age: 42
End: 2022-04-20
Payer: COMMERCIAL

## 2022-04-20 ENCOUNTER — LAB VISIT (OUTPATIENT)
Dept: LAB | Facility: HOSPITAL | Age: 42
End: 2022-04-20
Attending: INTERNAL MEDICINE
Payer: COMMERCIAL

## 2022-04-20 DIAGNOSIS — E83.110 HEREDITARY HEMOCHROMATOSIS: ICD-10-CM

## 2022-04-20 DIAGNOSIS — E83.110 HEREDITARY HEMOCHROMATOSIS: Primary | ICD-10-CM

## 2022-04-20 LAB — HCG INTACT+B SERPL-ACNC: <2.4 MIU/ML

## 2022-04-20 PROCEDURE — 84702 CHORIONIC GONADOTROPIN TEST: CPT | Performed by: INTERNAL MEDICINE

## 2022-04-20 PROCEDURE — 36415 COLL VENOUS BLD VENIPUNCTURE: CPT | Mod: PN | Performed by: INTERNAL MEDICINE

## 2022-04-22 ENCOUNTER — HOSPITAL ENCOUNTER (OUTPATIENT)
Dept: RADIOLOGY | Facility: HOSPITAL | Age: 42
Discharge: HOME | End: 2022-04-22
Attending: INTERNAL MEDICINE
Payer: COMMERCIAL

## 2022-04-22 ENCOUNTER — PATIENT MESSAGE (OUTPATIENT)
Dept: HEPATOLOGY | Facility: CLINIC | Age: 42
End: 2022-04-22
Payer: COMMERCIAL

## 2022-04-22 ENCOUNTER — PATIENT MESSAGE (OUTPATIENT)
Dept: HEMATOLOGY/ONCOLOGY | Facility: CLINIC | Age: 42
End: 2022-04-22
Payer: COMMERCIAL

## 2022-04-22 ENCOUNTER — PATIENT MESSAGE (OUTPATIENT)
Dept: HEMATOLOGY/ONCOLOGY | Facility: CLINIC | Age: 42
End: 2022-04-22

## 2022-04-22 ENCOUNTER — OFFICE VISIT (OUTPATIENT)
Dept: HEMATOLOGY/ONCOLOGY | Facility: CLINIC | Age: 42
End: 2022-04-22
Payer: COMMERCIAL

## 2022-04-22 DIAGNOSIS — O35.BXX0 PREGNANCY COMPLICATED BY FETAL CONGENITAL HEART DISEASE, SINGLE OR UNSPECIFIED FETUS: ICD-10-CM

## 2022-04-22 DIAGNOSIS — E83.110 HEREDITARY HEMOCHROMATOSIS: ICD-10-CM

## 2022-04-22 DIAGNOSIS — K76.9 LIVER LESION: ICD-10-CM

## 2022-04-22 DIAGNOSIS — E83.110 HEREDITARY HEMOCHROMATOSIS: Primary | ICD-10-CM

## 2022-04-22 PROCEDURE — 25500020 PHARM REV CODE 255: Performed by: INTERNAL MEDICINE

## 2022-04-22 PROCEDURE — A9581 GADOXETATE DISODIUM INJ: HCPCS | Performed by: INTERNAL MEDICINE

## 2022-04-22 PROCEDURE — 76391 MR ELASTOGRAPHY: CPT | Mod: TC

## 2022-04-22 PROCEDURE — 76391 MR ELASTOGRAPHY: ICD-10-PCS | Mod: 26,,, | Performed by: RADIOLOGY

## 2022-04-22 PROCEDURE — 99214 PR OFFICE/OUTPT VISIT, EST, LEVL IV, 30-39 MIN: ICD-10-PCS | Mod: 95,,, | Performed by: INTERNAL MEDICINE

## 2022-04-22 PROCEDURE — 74183 MRI ABD W/O CNTR FLWD CNTR: CPT | Mod: TC

## 2022-04-22 PROCEDURE — 76391 MR ELASTOGRAPHY: CPT | Mod: 26,,, | Performed by: RADIOLOGY

## 2022-04-22 PROCEDURE — 74183 MRI ABD W/O CNTR FLWD CNTR: CPT | Mod: 26,,, | Performed by: RADIOLOGY

## 2022-04-22 PROCEDURE — 99214 OFFICE O/P EST MOD 30 MIN: CPT | Mod: 95,,, | Performed by: INTERNAL MEDICINE

## 2022-04-22 PROCEDURE — 74183 MRI ABDOMEN W WO CONTRAST: ICD-10-PCS | Mod: 26,,, | Performed by: RADIOLOGY

## 2022-04-22 RX ADMIN — GADOXETATE DISODIUM 10 ML: 181.43 INJECTION, SOLUTION INTRAVENOUS at 07:04

## 2022-04-22 NOTE — PROGRESS NOTES
Hematology and Medical Oncology   New Patient Consult     04/22/2022  Referred by:  Dr. Ely Back    Reason For Referral: Hemochromatosis    Telemedicine Documentation:  The patient location is: home  The chief complaint leading to consultation is: hemochromatosis    Visit type: audiovisual    Face to Face time with patient: 25  35 minutes of total time spent on the encounter, which includes face to face time and non-face to face time preparing to see the patient (eg, review of tests), Obtaining and/or reviewing separately obtained history, Documenting clinical information in the electronic or other health record, Independently interpreting results (not separately reported) and communicating results to the patient/family/caregiver, or Care coordination (not separately reported).         Each patient to whom he or she provides medical services by telemedicine is:  (1) informed of the relationship between the physician and patient and the respective role of any other health care provider with respect to management of the patient; and (2) notified that he or she may decline to receive medical services by telemedicine and may withdraw from such care at any time.      History of Present Ilness:   Danielle Garcia (Danielle) is a pleasant 41 y.o.female who presents virtually to discuss her hemochromatosis and need for labs.      2012 or 2013 labs at [facility] to diagnose hemochromatosis  Did phlebotomy to get ferritin from 300 --> less than 50     Last one is February 2019    VSD of heart   Kids are 9 months and 27 months      MRI this afternoon     Iron: 234  Ferritin: 102    Plan for q 2 weeks phlebotomy --  Out of town June and July          PAST MEDICAL HISTORY:   Past Medical History:   Diagnosis Date    Hemochromatosis        PAST SURGICAL HISTORY:   Past Surgical History:   Procedure Laterality Date    KNEE SURGERY Right 07/2018    VSD REPAIR      1yo (1982)       PAST SOCIAL HISTORY:   reports that  she has never smoked. She has never used smokeless tobacco. She reports previous alcohol use. She reports that she does not use drugs.    FAMILY HISTORY:  Family History   Problem Relation Age of Onset    Breast cancer Neg Hx     Colon cancer Neg Hx     Ovarian cancer Neg Hx     Arrhythmia Neg Hx     Cardiomyopathy Neg Hx     Congenital heart disease Neg Hx     Early death Neg Hx     Heart attacks under age 50 Neg Hx     Hypertension Neg Hx     Long QT syndrome Neg Hx     Pacemaker/defibrilator Neg Hx        CURRENT MEDICATIONS:   Current Outpatient Medications   Medication Sig    ALPRAZolam (XANAX) 0.5 MG tablet Take 0.5 mg by mouth daily as needed.    cholecalciferol, vitamin D3, 1,250 mcg (50,000 unit) capsule Take 50,000 Units by mouth every 7 days.    dextroamphetamine-amphetamine (ADDERALL) 20 mg tablet TAKE 1 TABLET BY MOUTH TWICE DAILY TO CONTROL ADD    drospirenone-ethinyl estradioL (AYDEN, 28,) 3-0.03 mg per tablet Take 1 tablet by mouth once daily.    ibuprofen (ADVIL,MOTRIN) 600 MG tablet Take 1 tablet (600 mg total) by mouth every 8 (eight) hours as needed for Pain.    magnesium oxide (MAG-OX) 400 mg (241.3 mg magnesium) tablet Take 1 tablet by mouth 2 (two) times daily.    oxyCODONE-acetaminophen (PERCOCET) 5-325 mg per tablet Take 1 tablet by mouth every 4 (four) hours as needed for Pain.    valACYclovir (VALTREX) 500 MG tablet PRN    zolpidem (AMBIEN) 10 mg Tab Take 5 mg by mouth nightly as needed.     No current facility-administered medications for this visit.     ALLERGIES:   Review of patient's allergies indicates:  No Known Allergies      Review of Systems:     Review of Systems   Constitutional: Negative for appetite change and unexpected weight change.   HENT:   Negative for mouth sores.    Respiratory: Negative for cough and shortness of breath.    Cardiovascular: Negative for chest pain.   Gastrointestinal: Negative for abdominal pain and diarrhea.   Genitourinary:  Negative for frequency.    Musculoskeletal: Negative for back pain.   Skin: Negative for rash.   Neurological: Negative for headaches.   Hematological: Negative for adenopathy.   Psychiatric/Behavioral: The patient is not nervous/anxious.           Physical Exam:   Limited secondary to virtual visit    ECOG Performance Status: (foot note - ECOG PS provided by Eastern Cooperative Oncology Group) 0 - Asymptomatic    Karnofsky Performance Score:  100%- Normal, No Complaints, No Evidence of Disease    Labs:   Lab Results   Component Value Date    WBC 15.51 (H) 08/03/2021    HGB 12.1 08/03/2021    HCT 34.7 (L) 08/03/2021     08/03/2021    ALT 19 10/29/2019    AST 20 10/29/2019     (L) 10/29/2019    K 3.9 10/29/2019     10/29/2019    CREATININE 0.6 10/29/2019    BUN 4 (L) 10/29/2019    CO2 21 (L) 10/29/2019       Imaging: Previous imaging has been reviewed     MRI Abdomen: Liver R2* value of 66 Hz consistent with borderline mild iron overload (LIC estimated at 3.52 mg/g).      Assessment and Plan:     Mrs. Theodore is a pleasant 41 year old female who presents virtually to discuss labs and plans for future phlebotomy      Hereditary hemochromatosis  --plan for q 2 week phlebotomy with fluids in the infusion center  --recheck Ferritin prior to summer vacation  --MRI to be done for baseline iron deposition      25 minutes were spent face to face with the patient to discuss the disease, natural history, treatment options and survival statistics. I have provided the patient with an opportunity to ask questions and have all questions answered to her satisfaction.       she will return to clinic in 6 months, but knows to call in the interim if symptoms change or should a problem arise.        Ann Urias MD  Hematology and Medical Oncology  Bone Marrow Transplant  [facility]        BMT Chart Routing      Follow up with physician . 1. plebotomy in the infusion center with 1 L fluids every 2  weeks x2. 2.labs in early june: cbc,cmp, iron,ferritin 3. see me virtually 1-2 days after the labs   Follow up with KAREN    Labs CBC, CMP, iron and TIBC and ferritin   Lab interval:     Imaging None      Pharmacy appointment No pharmacy appointment needed      Other referrals No additional referrals needed

## 2022-04-25 ENCOUNTER — TELEPHONE (OUTPATIENT)
Dept: VASCULAR SURGERY | Facility: CLINIC | Age: 42
End: 2022-04-25
Payer: COMMERCIAL

## 2022-04-29 ENCOUNTER — PATIENT MESSAGE (OUTPATIENT)
Dept: HEMATOLOGY/ONCOLOGY | Facility: CLINIC | Age: 42
End: 2022-04-29
Payer: COMMERCIAL

## 2022-05-04 ENCOUNTER — OFFICE VISIT (OUTPATIENT)
Dept: VASCULAR SURGERY | Facility: CLINIC | Age: 42
End: 2022-05-04
Payer: COMMERCIAL

## 2022-05-04 VITALS — HEART RATE: 78 BPM | DIASTOLIC BLOOD PRESSURE: 66 MMHG | SYSTOLIC BLOOD PRESSURE: 109 MMHG

## 2022-05-04 DIAGNOSIS — I87.2 VENOUS INSUFFICIENCY OF BOTH LOWER EXTREMITIES: Primary | ICD-10-CM

## 2022-05-04 DIAGNOSIS — O22.02 VARICOSE VEINS OF LOWER EXTREMITY DURING PREGNANCY IN SECOND TRIMESTER: ICD-10-CM

## 2022-05-04 PROCEDURE — 3078F DIAST BP <80 MM HG: CPT | Mod: CPTII,S$GLB,, | Performed by: SURGERY

## 2022-05-04 PROCEDURE — 1159F MED LIST DOCD IN RCRD: CPT | Mod: CPTII,S$GLB,, | Performed by: SURGERY

## 2022-05-04 PROCEDURE — 3074F PR MOST RECENT SYSTOLIC BLOOD PRESSURE < 130 MM HG: ICD-10-PCS | Mod: CPTII,S$GLB,, | Performed by: SURGERY

## 2022-05-04 PROCEDURE — 1159F PR MEDICATION LIST DOCUMENTED IN MEDICAL RECORD: ICD-10-PCS | Mod: CPTII,S$GLB,, | Performed by: SURGERY

## 2022-05-04 PROCEDURE — 3074F SYST BP LT 130 MM HG: CPT | Mod: CPTII,S$GLB,, | Performed by: SURGERY

## 2022-05-04 PROCEDURE — 99214 OFFICE O/P EST MOD 30 MIN: CPT | Mod: S$GLB,,, | Performed by: SURGERY

## 2022-05-04 PROCEDURE — 3078F PR MOST RECENT DIASTOLIC BLOOD PRESSURE < 80 MM HG: ICD-10-PCS | Mod: CPTII,S$GLB,, | Performed by: SURGERY

## 2022-05-04 PROCEDURE — 99214 PR OFFICE/OUTPT VISIT, EST, LEVL IV, 30-39 MIN: ICD-10-PCS | Mod: S$GLB,,, | Performed by: SURGERY

## 2022-05-04 NOTE — LETTER
May 4, 2022        Jayde Mata MD  [address]  [address]  [facility] [address]             [facility]  [address] [address]  Phone: [phone]  Fax: [phone]   Patient: Danielle Garcia   MR Number: [id number]   YOB: 1980   Date of Visit: 5/4/2022       Dear Dr. Mata:    Thank you for referring Danielle Garcia to me for evaluation. Below are the relevant portions of my assessment and plan of care.            If you have questions, please do not hesitate to call me. I look forward to following Danielle along with you.    Sincerely,      Ricky Campbell MD           CC  No Recipients

## 2022-05-04 NOTE — PROGRESS NOTES
Ricky Campbell MD, RPVI                                 [facility]                           [facility]                             05/04/2022    HPI:  Danielle Garcia is a 41 y.o. female with   Patient Active Problem List   Diagnosis    Hereditary hemochromatosis    Advanced maternal age, primigravida, antepartum    Cervical high risk human papillomavirus (HPV) DNA test positive    Varicose veins of lower extremity during pregnancy in third trimester    History of VSD, s/p repair at age 2y    Pregnancy complicated by fetal congenital heart disease, VSD    Adult congenital heart disease    Liver lesion    being managed by PCP and specialists who is here today for evaluation of RLE varicose veins.  Patient states location is RLE occurring for months.  Associated signs and symptoms include edema.  Quality is heavy and severity is 5/10.  Symptoms began after her first pregnancy.  Alleviating factors include elevation.  Worsening factors include dependency.  Patient is not wearing compression stockings daily.  +FH of venous disease.  Symptoms do not limit patient's functional status and daily activities.  no DVT history.  no venous interventions.  + low sodium diet.  no excessive water intake.    Migraine with aura: no  PFO/ASD/right to left shunt: no  Pregnant: +  Breastfeeding: no    no MI  no Stroke  Tobacco use: no    5/2022: postpartum 9 mo.  Cont to experience R medial thigh varicose vein pain and edema, heaviness despite compression and elevation > 3 mo    Past Medical History:   Diagnosis Date    Hemochromatosis      Past Surgical History:   Procedure Laterality Date    KNEE SURGERY Right 07/2018    VSD REPAIR      1yo (1982)     Family History   Problem Relation Age of Onset    Breast cancer Neg Hx     Colon cancer Neg Hx     Ovarian cancer Neg Hx     Arrhythmia Neg Hx     Cardiomyopathy Neg Hx     Congenital heart disease Neg Hx     Early death Neg Hx      Heart attacks under age 50 Neg Hx     Hypertension Neg Hx     Long QT syndrome Neg Hx     Pacemaker/defibrilator Neg Hx      Social History     Socioeconomic History    Marital status:    Tobacco Use    Smoking status: Never Smoker    Smokeless tobacco: Never Used   Substance and Sexual Activity    Alcohol use: Not Currently    Drug use: Never    Sexual activity: Yes     Partners: Male     Birth control/protection: None       Current Outpatient Medications:     ALPRAZolam (XANAX) 0.5 MG tablet, Take 0.5 mg by mouth daily as needed., Disp: , Rfl:     cholecalciferol, vitamin D3, 1,250 mcg (50,000 unit) capsule, Take 50,000 Units by mouth every 7 days., Disp: , Rfl:     dextroamphetamine-amphetamine (ADDERALL) 20 mg tablet, TAKE 1 TABLET BY MOUTH TWICE DAILY TO CONTROL ADD, Disp: , Rfl:     ibuprofen (ADVIL,MOTRIN) 600 MG tablet, Take 1 tablet (600 mg total) by mouth every 8 (eight) hours as needed for Pain., Disp: 30 tablet, Rfl: 1    magnesium oxide (MAG-OX) 400 mg (241.3 mg magnesium) tablet, Take 1 tablet by mouth 2 (two) times daily., Disp: , Rfl:     valACYclovir (VALTREX) 500 MG tablet, PRN, Disp: , Rfl: 0    zolpidem (AMBIEN) 10 mg Tab, Take 5 mg by mouth nightly as needed., Disp: , Rfl:     drospirenone-ethinyl estradioL (AYDEN, 28,) 3-0.03 mg per tablet, Take 1 tablet by mouth once daily., Disp: 90 tablet, Rfl: 3    oxyCODONE-acetaminophen (PERCOCET) 5-325 mg per tablet, Take 1 tablet by mouth every 4 (four) hours as needed for Pain. (Patient not taking: Reported on 5/4/2022), Disp: 8 tablet, Rfl: 0    REVIEW OF SYSTEMS:  General: No fevers or chills; ENT: No sore throat; Allergy and Immunology: no persistent infections; Hematological and Lymphatic: No history of bleeding or easy bruising; Endocrine: negative; Respiratory: no cough, shortness of breath, or wheezing; Cardiovascular: no chest pain or dyspnea on exertion; Gastrointestinal: no abdominal pain/back, change in bowel  habits, or bloody stools; Genito-Urinary: no dysuria, trouble voiding, or hematuria; Musculoskeletal: edema; Neurological: no TIA or stroke symptoms; Psychiatric: no nervousness, anxiety or depression.    PHYSICAL EXAM:      Pulse: 78         General appearance:  Alert, well-appearing, and in no distress.  Oriented to person, place, and time                    Neurological: Normal speech, no focal findings noted; CN II - XII grossly intact. RLE with sensation to light touch, LLE with sensation to light touch.            Musculoskeletal: Digits/nail without cyanosis/clubbing.  Strength 5/5 BLE.                    Neck: Supple, no significant adenopathy                  Chest:  No wheezes, symmetric air entry. No use of accessory muscles               Cardiac: Normal rate and regular rhythm            Abdomen: Soft, nontender, nondistended      Extremities:   2+ R DP pulse, 2+ L DP pulse      1+ RLE edema, 1+ LLE edema    Skin:  RLE no ulcer; LLE no ulcer      RLE + spider veins, LLE no spider veins      RLE + varicose veins, LLE no varicose veins    CEAP 3/3        LAB RESULTS:  No results found for: CBC  No results found for: LABPROT, INR  Lab Results   Component Value Date     (L) 10/29/2019    K 3.9 10/29/2019     10/29/2019    CO2 21 (L) 10/29/2019     10/29/2019    BUN 4 (L) 10/29/2019    CREATININE 0.6 10/29/2019    CALCIUM 9.0 10/29/2019    ANIONGAP 9 10/29/2019    EGFRNONAA >60 10/29/2019     Lab Results   Component Value Date    WBC 15.51 (H) 08/03/2021    RBC 3.55 (L) 08/03/2021    HGB 12.1 08/03/2021    HCT 34.7 (L) 08/03/2021    MCV 98 08/03/2021    MCH 34.1 (H) 08/03/2021    MCHC 34.9 08/03/2021    RDW 13.0 08/03/2021     08/03/2021    MPV 11.1 08/03/2021    GRAN 12.9 (H) 08/03/2021    GRAN 83.1 (H) 08/03/2021    LYMPH 1.6 08/03/2021    LYMPH 10.3 (L) 08/03/2021    MONO 0.8 08/03/2021    MONO 5.2 08/03/2021    EOS 0.1 08/03/2021    BASO 0.03 08/03/2021    EOSINOPHIL 0.6  08/03/2021    BASOPHIL 0.2 08/03/2021    DIFFMETHOD Automated 08/03/2021     .No results found for: HGBA1C    IMAGING:  All pertinent imaging has been reviewed and interpreted independently.    Venous US 2021 reviewed.    IMP/PLAN:  41 y.o. female with   Patient Active Problem List   Diagnosis    Hereditary hemochromatosis    Advanced maternal age, primigravida, antepartum    Cervical high risk human papillomavirus (HPV) DNA test positive    Varicose veins of lower extremity during pregnancy in third trimester    History of VSD, s/p repair at age 2y    Pregnancy complicated by fetal congenital heart disease, VSD    Adult congenital heart disease    Liver lesion    being managed by PCP and specialists who is here today for evaluation of RLE edema and varicose veins during pregnancy.    -recommend compression with Rx stockings, elevation, dietary changes associated with water and sodium intake discussed at length with patient  -Exercise   -Repeat venous insuff US - call with plan of care  -will evaluate for laser ablation versus stab phlebectomy and sclerotherapy    I spent 12 minutes evaluating this patient and greater than 50% of the time was spent counseling, coordinator care and discussing the plan of care.  All questions were answered and patient stated understanding with agreement with the above treatment plan.    Ricky Campbell MD [facility]  Vascular and Endovascular Surgery

## 2022-05-05 DIAGNOSIS — I83.893 VARICOSE VEINS OF LEG WITH EDEMA, BILATERAL: Primary | ICD-10-CM

## 2022-05-05 DIAGNOSIS — I83.813 VARICOSE VEINS OF LEG WITH PAIN, BILATERAL: ICD-10-CM

## 2022-05-05 DIAGNOSIS — I83.893 VARICOSE VEINS OF LEG WITH SWELLING, BILATERAL: ICD-10-CM

## 2022-05-12 ENCOUNTER — PATIENT MESSAGE (OUTPATIENT)
Dept: HEMATOLOGY/ONCOLOGY | Facility: CLINIC | Age: 42
End: 2022-05-12
Payer: COMMERCIAL

## 2022-05-12 ENCOUNTER — PATIENT MESSAGE (OUTPATIENT)
Dept: VASCULAR SURGERY | Facility: CLINIC | Age: 42
End: 2022-05-12
Payer: COMMERCIAL

## 2022-05-12 ENCOUNTER — OFFICE VISIT (OUTPATIENT)
Dept: HEPATOLOGY | Facility: CLINIC | Age: 42
End: 2022-05-12
Payer: COMMERCIAL

## 2022-05-12 VITALS
SYSTOLIC BLOOD PRESSURE: 95 MMHG | TEMPERATURE: 98 F | RESPIRATION RATE: 18 BRPM | DIASTOLIC BLOOD PRESSURE: 59 MMHG | HEIGHT: 65 IN | BODY MASS INDEX: 23.12 KG/M2 | OXYGEN SATURATION: 99 % | WEIGHT: 138.75 LBS | HEART RATE: 90 BPM

## 2022-05-12 DIAGNOSIS — K76.9 LIVER LESION: ICD-10-CM

## 2022-05-12 DIAGNOSIS — E83.110 HEREDITARY HEMOCHROMATOSIS: Primary | ICD-10-CM

## 2022-05-12 PROCEDURE — 1160F PR REVIEW ALL MEDS BY PRESCRIBER/CLIN PHARMACIST DOCUMENTED: ICD-10-PCS | Mod: CPTII,S$GLB,, | Performed by: INTERNAL MEDICINE

## 2022-05-12 PROCEDURE — 3008F BODY MASS INDEX DOCD: CPT | Mod: CPTII,S$GLB,, | Performed by: INTERNAL MEDICINE

## 2022-05-12 PROCEDURE — 3078F PR MOST RECENT DIASTOLIC BLOOD PRESSURE < 80 MM HG: ICD-10-PCS | Mod: CPTII,S$GLB,, | Performed by: INTERNAL MEDICINE

## 2022-05-12 PROCEDURE — 1160F RVW MEDS BY RX/DR IN RCRD: CPT | Mod: CPTII,S$GLB,, | Performed by: INTERNAL MEDICINE

## 2022-05-12 PROCEDURE — 1159F PR MEDICATION LIST DOCUMENTED IN MEDICAL RECORD: ICD-10-PCS | Mod: CPTII,S$GLB,, | Performed by: INTERNAL MEDICINE

## 2022-05-12 PROCEDURE — 99214 PR OFFICE/OUTPT VISIT, EST, LEVL IV, 30-39 MIN: ICD-10-PCS | Mod: S$GLB,,, | Performed by: INTERNAL MEDICINE

## 2022-05-12 PROCEDURE — 3074F PR MOST RECENT SYSTOLIC BLOOD PRESSURE < 130 MM HG: ICD-10-PCS | Mod: CPTII,S$GLB,, | Performed by: INTERNAL MEDICINE

## 2022-05-12 PROCEDURE — 99999 PR PBB SHADOW E&M-EST. PATIENT-LVL V: CPT | Mod: PBBFAC,,, | Performed by: INTERNAL MEDICINE

## 2022-05-12 PROCEDURE — 3078F DIAST BP <80 MM HG: CPT | Mod: CPTII,S$GLB,, | Performed by: INTERNAL MEDICINE

## 2022-05-12 PROCEDURE — 3008F PR BODY MASS INDEX (BMI) DOCUMENTED: ICD-10-PCS | Mod: CPTII,S$GLB,, | Performed by: INTERNAL MEDICINE

## 2022-05-12 PROCEDURE — 3074F SYST BP LT 130 MM HG: CPT | Mod: CPTII,S$GLB,, | Performed by: INTERNAL MEDICINE

## 2022-05-12 PROCEDURE — 99999 PR PBB SHADOW E&M-EST. PATIENT-LVL V: ICD-10-PCS | Mod: PBBFAC,,, | Performed by: INTERNAL MEDICINE

## 2022-05-12 PROCEDURE — 99214 OFFICE O/P EST MOD 30 MIN: CPT | Mod: S$GLB,,, | Performed by: INTERNAL MEDICINE

## 2022-05-12 PROCEDURE — 1159F MED LIST DOCD IN RCRD: CPT | Mod: CPTII,S$GLB,, | Performed by: INTERNAL MEDICINE

## 2022-05-12 NOTE — PROGRESS NOTES
"HEPATOLOGY FOLLOW UP    Referring Physician: Self-referral  Current Corresponding Physician: Jayde Mata MD    Danielle Garcia is here for follow up of hereditary hemochromatosis      HPI  I first saw this patient in consultation in 10/22/19 when she was 33 weeks pregnant.  At the time she was a 39 y.o. female who was found out to have an elevated ferritin in the 300-400s range. 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 She was treated with phlebotomy and initially was phlebotomized once weekly. She last underwent phlebotomy in March 2019. It sounds like she only underwent phlebotomy 6 times before she became iron deficient.  Other data:  --2012 ALT and AST 22/22  --MRI abdomen 2012: Findings suggest a mild degree of increased density in the liver, which  can be seen with iron deposition.  --ferritin at the time 272  -- alpha1 antitrypsin level 136 in 2012  -78% iron saturation in 2013 2015 liver biopsy (may have been done after she started phlebotomy)  The perivenular sinusoidal dilatation suggests possible passive congestion. The pattern of the spotty lobular hepatitis is not specific and may represent drug/toxin-induced liver injury. CLINICAL HISTORY Hepatomegaly. Homozygous C282Y mutation, has received therapeutic phlebotomy. Heterozygous alpha-1 antitrypsin phenotype PiM2S with normal enzyme protein levels. Intermittent borderline elevations of AMA M2. SPECIMEN SOURCE Right lobe liver biopsy, ultrasound guided by Dr. Mcdonough GROSS DESCRIPTION The specimen is received in formalin, is labeled with the patient's information and "right lobeliver biopsy" and consists of three cores of light brown soft tissue, ranging in length from 1.8 to 2 cm, entirely submitted in cassette A1. CG/pl/RLS MICROSCOPIC The liver biopsy includes 35 portal areas, most of which appear normal. Focal minimal portal inflammation " consists of small lymphocytes, with rare plasma cells and eosinophils. The interlobular bile ducts demonstrate normal morphology. Focal cholangiolar proliferation is   minimal. No portal fibrosis is evident (trichrome).     The lobules contain rare acidophil bodies, associated with minimal lymphohistiocytic   inflammation. The hepatocytes contain a mild amount of lipofuscin. Macrovesicular steatosis is   minimal and involves less than 5% of the hepatocytes. Rare hepatocytes demonstrate minimal   stainable iron (0-1+; Perls). No ballooning degeneration or alpha-1 antitrypsin accumulation   (PAS with diastase) is identified.      5/1/19 labs: ALT 21, AST 23, ALKP 65, Tbil 0.6  Ferritin 14 7/24/19 and 25 8/29/19 with iron sat of 10 % and 48% respectively.    My impression at the time:  She definitely has the genetic defect for hereditary hemochromatosis. In 2012 her liver enzymes were normal at a time when there was only a modestly elevated ferritin, but MRI did suggest some iron overload. Subsequent liver biopsy did not show significant iron. However, the biopsy may have been done after she initiated phlebotomy which would explain the lack of iron. Of note, she was not phlebotomized many times before she became anemic which would suggest she was not very iron overloaded. She likely has hemochromatosis. I wanted to review her records from [place] including the liver biopsy, but was unable to obtain records.    Interval History  Since Danielle Garcia's last 2 visit2:    Delivered 2 children without complication. Has had ~2-3 phelbotomies. Feeling well. The patient denies any symptoms of decompensated cirrhosis, including no ascites or edema, cognitive problems that would suggest hepatic encephalopathy, or GI bleeding from varices.    Labs 3/22/22: ALT 23, AST 29, ALKP 72, Tbil 0.3, hemoglobin 13.8  2/22/22: iron sat 96%, ferritin 102    MRI abdo w wo eovist/MRE 4/22/22: The liver is normal in size.  Hepatic  parenchyma demonstrates slight increased signal intensity on out of phase imaging suggesting increased iron overload.  There are 3 lesions (periphery of segment 8: 1 cm, series 9, image 24; posterior aspect of junction of segments 6 and 7: 0.7 cm, series 9, image 30; segment 5: 0.9 cm, series 9, image 42) that demonstrate homogeneous T2 signal hyperintensity and peripheral discontinuous nodular enhancement on the early postcontrast phases that progressively fills in on the 5 minute delayed phase, findings that are consistent with hemangiomas.  Multiple additional foci of diffusion signal hyperintensity throughout the right lobe, some of which demonstrate corresponding T2 signal hyperintensity but are too small to accurately characterize.  No intrahepatic bile duct dilatation.  Portal vasculature is patent; could not assess fibrosis due to presence of iron  CT abdo/pelvis TP 4/7/22: The liver capsule smooth. The parenchyma is homogeneous. Scattered hypodensities are identified throughout the liver which are too small to characterize. Also several hypodense lesions are identified which likely demonstrate peripheral enhancement and become isodense on delayed imaging suggestive of hemangiomas  Abdo US 2/22/2022: The liver is nonenlarged. There is a 7 mm hyperechoic nodule in the mid right hepatic lobe. There is a poorly defined heterogeneous lesion in the anterior right hepatic lobe measuring 3.3 x 2.9 x 2.6 cm. There is no biliary dilatation. There is a 10 mm hyperechoic nodule in the right hepatic lobe. The main portal vein is patent with hepatopedal flow. The CBD measures 2 mm.    Abdo US 12/2017: Liver: Size: 16.1 cm in the right midclavicular line, normal; Appearance: Mildly coarse echogenicity, smooth contour;  Mass: 0.7 x 0.9 x 0.9 cm hyperechoic lesion in the right hepatic lobe.   MRI w wo conrast 3/6/2015: The liver appears to be enlarged with the inferior taper reaching the inferior pole of the right kidney  near the level of the right iliac bone. Signal intensity from the liver is slightly more hyperintense   on T1 out of phase when compare with the T1 in phase (series 902, versus series 901).  Focal hyperintense lesion on T2 measuring 8 mm in diameter within segment 8, demonstrate restriction diffusion, and progressively   filling in on postcontrast examination.           Outpatient Encounter Medications as of 5/12/2022   Medication Sig Dispense Refill    ALPRAZolam (XANAX) 0.5 MG tablet Take 0.5 mg by mouth daily as needed.      dextroamphetamine-amphetamine (ADDERALL) 20 mg tablet TAKE 1 TABLET BY MOUTH TWICE DAILY TO CONTROL ADD      valACYclovir (VALTREX) 500 MG tablet PRN  0    zolpidem (AMBIEN) 10 mg Tab Take 5 mg by mouth nightly as needed.      cholecalciferol, vitamin D3, 1,250 mcg (50,000 unit) capsule Take 50,000 Units by mouth every 7 days.      drospirenone-ethinyl estradioL (AYDEN, 28,) 3-0.03 mg per tablet Take 1 tablet by mouth once daily. 90 tablet 3    ibuprofen (ADVIL,MOTRIN) 600 MG tablet Take 1 tablet (600 mg total) by mouth every 8 (eight) hours as needed for Pain. 30 tablet 1    magnesium oxide (MAG-OX) 400 mg (241.3 mg magnesium) tablet Take 1 tablet by mouth 2 (two) times daily.      oxyCODONE-acetaminophen (PERCOCET) 5-325 mg per tablet Take 1 tablet by mouth every 4 (four) hours as needed for Pain. 8 tablet 0     No facility-administered encounter medications on file as of 5/12/2022.     Review of patient's allergies indicates:  No Known Allergies  Past Medical History:   Diagnosis Date    Hemochromatosis        Review of Systems   Constitutional: Negative.    HENT: Negative.    Eyes: Negative.    Respiratory: Negative.    Cardiovascular: Negative.    Gastrointestinal: Negative.    Genitourinary: Negative.    Musculoskeletal: Negative.    Skin: Negative.    Neurological: Negative.    Psychiatric/Behavioral: Negative.      Vitals:    05/12/22 1621   BP: (!) 95/59   Pulse: 90    Resp: 18   Temp: 98.2 °F (36.8 °C)       Physical Exam  Vitals reviewed.   Constitutional:       Appearance: She is well-developed.   HENT:      Head: Normocephalic and atraumatic.   Eyes:      General: No scleral icterus.     Conjunctiva/sclera: Conjunctivae normal.      Pupils: Pupils are equal, round, and reactive to light.   Neck:      Thyroid: No thyromegaly.   Cardiovascular:      Rate and Rhythm: Normal rate and regular rhythm.      Heart sounds: Normal heart sounds.   Pulmonary:      Effort: Pulmonary effort is normal.      Breath sounds: Normal breath sounds. No rales.   Abdominal:      General: Bowel sounds are normal. There is no distension.      Palpations: Abdomen is soft. There is no mass.      Tenderness: There is no abdominal tenderness.   Musculoskeletal:         General: Normal range of motion.      Cervical back: Normal range of motion and neck supple.   Skin:     General: Skin is warm and dry.      Findings: No rash.   Neurological:      Mental Status: She is alert and oriented to person, place, and time.         Computed MELD-Na score unavailable. Necessary lab results were not found in the last year.  Computed MELD score unavailable. Necessary lab results were not found in the last year.    Lab Results   Component Value Date     10/29/2019    BUN 4 (L) 10/29/2019    CREATININE 0.6 10/29/2019    CALCIUM 9.0 10/29/2019     (L) 10/29/2019    K 3.9 10/29/2019     10/29/2019    PROT 6.4 10/29/2019    CO2 21 (L) 10/29/2019    ANIONGAP 9 10/29/2019    WBC 15.51 (H) 08/03/2021    RBC 3.55 (L) 08/03/2021    HGB 12.1 08/03/2021    HCT 34.7 (L) 08/03/2021    MCV 98 08/03/2021    MCH 34.1 (H) 08/03/2021    MCHC 34.9 08/03/2021     Lab Results   Component Value Date    RDW 13.0 08/03/2021     08/03/2021    MPV 11.1 08/03/2021    GRAN 12.9 (H) 08/03/2021    GRAN 83.1 (H) 08/03/2021    LYMPH 1.6 08/03/2021    LYMPH 10.3 (L) 08/03/2021    MONO 0.8 08/03/2021    MONO 5.2 08/03/2021     EOSINOPHIL 0.6 08/03/2021    BASOPHIL 0.2 08/03/2021    EOS 0.1 08/03/2021    BASO 0.03 08/03/2021    GROUPTRH O POS 08/02/2021    GROUPTRH O POS 12/15/2020    ALBUMIN 2.9 (L) 10/29/2019    AST 20 10/29/2019    ALT 19 10/29/2019    ALKPHOS 116 10/29/2019       Assessment and Plan:  Danielle Garcia is a 41 y.o. female withhereditary hemochromatosis  Her recent ferritin is above the threshold (ferritin <50) recommended and MRE shows excess iron -thus, I am recommending phlebotomy to maintain ferritin <50. She will have a cbc to check for anemia and iron studies before each phlebotomy.    The liver lesions noted c/w hepatic heamngiomas -recommend repeat MRI/MRE in one year and liver tests twice a year.

## 2022-05-13 ENCOUNTER — TELEPHONE (OUTPATIENT)
Dept: VASCULAR SURGERY | Facility: CLINIC | Age: 42
End: 2022-05-13
Payer: COMMERCIAL

## 2022-05-16 ENCOUNTER — HOSPITAL ENCOUNTER (OUTPATIENT)
Dept: RADIOLOGY | Facility: OTHER | Age: 42
Discharge: HOME | End: 2022-05-16
Attending: SURGERY
Payer: COMMERCIAL

## 2022-05-16 DIAGNOSIS — I83.893 VARICOSE VEINS OF LEG WITH SWELLING, BILATERAL: ICD-10-CM

## 2022-05-16 DIAGNOSIS — I83.893 VARICOSE VEINS OF LEG WITH EDEMA, BILATERAL: ICD-10-CM

## 2022-05-16 DIAGNOSIS — I83.813 VARICOSE VEINS OF LEG WITH PAIN, BILATERAL: ICD-10-CM

## 2022-05-16 PROCEDURE — 93970 US LOWER EXTREMITY VENOUS INSUFFICIENCY RIGHT: ICD-10-PCS | Mod: 26,RT,, | Performed by: RADIOLOGY

## 2022-05-16 PROCEDURE — 93970 EXTREMITY STUDY: CPT | Mod: 26,RT,, | Performed by: RADIOLOGY

## 2022-05-16 PROCEDURE — 93970 EXTREMITY STUDY: CPT | Mod: TC,RT

## 2022-06-07 ENCOUNTER — PATIENT MESSAGE (OUTPATIENT)
Dept: HEMATOLOGY/ONCOLOGY | Facility: CLINIC | Age: 42
End: 2022-06-07
Payer: COMMERCIAL

## 2022-06-07 ENCOUNTER — PATIENT MESSAGE (OUTPATIENT)
Dept: VASCULAR SURGERY | Facility: CLINIC | Age: 42
End: 2022-06-07
Payer: COMMERCIAL

## 2022-06-22 ENCOUNTER — PATIENT MESSAGE (OUTPATIENT)
Dept: HEMATOLOGY/ONCOLOGY | Facility: CLINIC | Age: 42
End: 2022-06-22
Payer: COMMERCIAL

## 2022-06-22 DIAGNOSIS — E83.110 HEREDITARY HEMOCHROMATOSIS: Primary | ICD-10-CM

## 2022-06-23 ENCOUNTER — TELEPHONE (OUTPATIENT)
Dept: HEMATOLOGY/ONCOLOGY | Facility: CLINIC | Age: 42
End: 2022-06-23
Payer: COMMERCIAL

## 2022-06-23 NOTE — TELEPHONE ENCOUNTER
----- Message from Carole Rene sent at 6/23/2022  7:11 AM CDT -----  Regarding: Appt  Contact: [phone]  Patient Danielle is calling. Patient would like to reschedule procedure for tomorrow. Please call patient at [phone]      Thank You

## 2022-06-24 ENCOUNTER — HOSPITAL ENCOUNTER (OUTPATIENT)
Dept: TRANSFUSION MEDICINE | Facility: HOSPITAL | Age: 42
Discharge: HOME | End: 2022-06-24
Payer: COMMERCIAL

## 2022-06-24 DIAGNOSIS — E83.110 HEREDITARY HEMOCHROMATOSIS: ICD-10-CM

## 2022-06-24 PROCEDURE — 99195 PHLEBOTOMY: CPT

## 2022-06-24 NOTE — PROGRESS NOTES
Pt presented to donor room ambulatory for a therapeutic phlebotomy.  Vital signs; b/p 104/65, pulse 85, temp 97.2, hematocrit 46% and weight 140 lbs.  493 mls of whole blood was drawn from right ac vein.  Pressure dressing applied after hemostasis was achieved.  Instructions for phlebotomy after care given verbally and written.  Tolerated well.  Refreshments provided.  Pt exited dept ambulatory by herself.

## 2022-07-20 ENCOUNTER — OFFICE VISIT (OUTPATIENT)
Dept: VASCULAR SURGERY | Facility: CLINIC | Age: 42
End: 2022-07-20
Payer: COMMERCIAL

## 2022-07-20 VITALS
WEIGHT: 138 LBS | HEIGHT: 65 IN | SYSTOLIC BLOOD PRESSURE: 109 MMHG | BODY MASS INDEX: 22.99 KG/M2 | HEART RATE: 98 BPM | DIASTOLIC BLOOD PRESSURE: 70 MMHG

## 2022-07-20 DIAGNOSIS — I83.891 VARICOSE VEINS OF LEG WITH SWELLING, RIGHT: ICD-10-CM

## 2022-07-20 DIAGNOSIS — I83.813 VARICOSE VEINS OF BILATERAL LOWER EXTREMITIES WITH PAIN: Primary | ICD-10-CM

## 2022-07-20 DIAGNOSIS — I78.1 SPIDER VEIN, SYMPTOMATIC: ICD-10-CM

## 2022-07-20 DIAGNOSIS — O22.03 VARICOSE VEINS OF LOWER EXTREMITY DURING PREGNANCY IN THIRD TRIMESTER: ICD-10-CM

## 2022-07-20 PROCEDURE — 99213 PR OFFICE/OUTPT VISIT, EST, LEVL III, 20-29 MIN: ICD-10-PCS | Mod: S$GLB,,, | Performed by: SURGERY

## 2022-07-20 PROCEDURE — 3074F PR MOST RECENT SYSTOLIC BLOOD PRESSURE < 130 MM HG: ICD-10-PCS | Mod: CPTII,S$GLB,, | Performed by: SURGERY

## 2022-07-20 PROCEDURE — 3078F DIAST BP <80 MM HG: CPT | Mod: CPTII,S$GLB,, | Performed by: SURGERY

## 2022-07-20 PROCEDURE — 3074F SYST BP LT 130 MM HG: CPT | Mod: CPTII,S$GLB,, | Performed by: SURGERY

## 2022-07-20 PROCEDURE — 3008F BODY MASS INDEX DOCD: CPT | Mod: CPTII,S$GLB,, | Performed by: SURGERY

## 2022-07-20 PROCEDURE — 99213 OFFICE O/P EST LOW 20 MIN: CPT | Mod: S$GLB,,, | Performed by: SURGERY

## 2022-07-20 PROCEDURE — 1159F MED LIST DOCD IN RCRD: CPT | Mod: CPTII,S$GLB,, | Performed by: SURGERY

## 2022-07-20 PROCEDURE — 1159F PR MEDICATION LIST DOCUMENTED IN MEDICAL RECORD: ICD-10-PCS | Mod: CPTII,S$GLB,, | Performed by: SURGERY

## 2022-07-20 PROCEDURE — 3008F PR BODY MASS INDEX (BMI) DOCUMENTED: ICD-10-PCS | Mod: CPTII,S$GLB,, | Performed by: SURGERY

## 2022-07-20 PROCEDURE — 3078F PR MOST RECENT DIASTOLIC BLOOD PRESSURE < 80 MM HG: ICD-10-PCS | Mod: CPTII,S$GLB,, | Performed by: SURGERY

## 2022-07-20 RX ORDER — ERGOCALCIFEROL 1.25 MG/1
CAPSULE ORAL
COMMUNITY
Start: 2022-06-22 | End: 2023-07-20

## 2022-07-20 NOTE — PROGRESS NOTES
Ricky Campbell MD, RPVI                                 [facility]                           [facility]                             07/20/2022    HPI:  Danielle Nelson is a 41 y.o. female with   Patient Active Problem List   Diagnosis    Hereditary hemochromatosis    Advanced maternal age, primigravida, antepartum    Cervical high risk human papillomavirus (HPV) DNA test positive    Varicose veins of lower extremity during pregnancy in third trimester    History of VSD, s/p repair at age 2y    Pregnancy complicated by fetal congenital heart disease, VSD    Adult congenital heart disease    Liver lesion    being managed by PCP and specialists who is here today for evaluation of RLE varicose veins.  Patient states location is RLE occurring for months.  Associated signs and symptoms include edema.  Quality is heavy and severity is 5/10.  Symptoms began after her first pregnancy.  Alleviating factors include elevation.  Worsening factors include dependency.  Patient is not wearing compression stockings daily.  +FH of venous disease.  Symptoms do not limit patient's functional status and daily activities.  no DVT history.  no venous interventions.  + low sodium diet.  no excessive water intake.    Migraine with aura: no  PFO/ASD/right to left shunt: no  Pregnant: +  Breastfeeding: no    no MI  no Stroke  Tobacco use: no    5/2022: postpartum 9 mo.  Cont to experience R medial thigh varicose vein pain and edema, heaviness despite compression and elevation > 3 mo.    7/2022: c/o painful RLE spider and varicose veins despite compression and elevation > 3 mo.    Past Medical History:   Diagnosis Date    Hemochromatosis      Past Surgical History:   Procedure Laterality Date    KNEE SURGERY Right 07/2018    VSD REPAIR      3yo (1982)     Family History   Problem Relation Age of Onset    Breast cancer Neg Hx     Colon cancer Neg Hx     Ovarian cancer Neg Hx      Arrhythmia Neg Hx     Cardiomyopathy Neg Hx     Congenital heart disease Neg Hx     Early death Neg Hx     Heart attacks under age 50 Neg Hx     Hypertension Neg Hx     Long QT syndrome Neg Hx     Pacemaker/defibrilator Neg Hx      Social History     Socioeconomic History    Marital status:    Tobacco Use    Smoking status: Never Smoker    Smokeless tobacco: Never Used   Substance and Sexual Activity    Alcohol use: Not Currently    Drug use: Never    Sexual activity: Yes     Partners: Male     Birth control/protection: None       Current Outpatient Medications:     ALPRAZolam (XANAX) 0.5 MG tablet, Take 0.5 mg by mouth daily as needed., Disp: , Rfl:     cholecalciferol, vitamin D3, 1,250 mcg (50,000 unit) capsule, Take 50,000 Units by mouth every 7 days., Disp: , Rfl:     dextroamphetamine-amphetamine (ADDERALL) 20 mg tablet, TAKE 1 TABLET BY MOUTH TWICE DAILY TO CONTROL ADD, Disp: , Rfl:     ergocalciferol (ERGOCALCIFEROL) 50,000 unit Cap, TAKE 1 CAPSULE BY MOUTH ONCE A WEEK FOR 3 MONTHS. THEN START OTC VITAMIND, Disp: , Rfl:     ibuprofen (ADVIL,MOTRIN) 600 MG tablet, Take 1 tablet (600 mg total) by mouth every 8 (eight) hours as needed for Pain., Disp: 30 tablet, Rfl: 1    magnesium oxide (MAG-OX) 400 mg (241.3 mg magnesium) tablet, Take 1 tablet by mouth 2 (two) times daily., Disp: , Rfl:     oxyCODONE-acetaminophen (PERCOCET) 5-325 mg per tablet, Take 1 tablet by mouth every 4 (four) hours as needed for Pain., Disp: 8 tablet, Rfl: 0    valACYclovir (VALTREX) 500 MG tablet, PRN, Disp: , Rfl: 0    zolpidem (AMBIEN) 10 mg Tab, Take 5 mg by mouth nightly as needed., Disp: , Rfl:     drospirenone-ethinyl estradioL (AYDEN, 28,) 3-0.03 mg per tablet, Take 1 tablet by mouth once daily., Disp: 90 tablet, Rfl: 3    REVIEW OF SYSTEMS:  General: No fevers or chills; ENT: No sore throat; Allergy and Immunology: no persistent infections; Hematological and Lymphatic: No history of bleeding or  easy bruising; Endocrine: negative; Respiratory: no cough, shortness of breath, or wheezing; Cardiovascular: no chest pain or dyspnea on exertion; Gastrointestinal: no abdominal pain/back, change in bowel habits, or bloody stools; Genito-Urinary: no dysuria, trouble voiding, or hematuria; Musculoskeletal: edema; Neurological: no TIA or stroke symptoms; Psychiatric: no nervousness, anxiety or depression.    PHYSICAL EXAM:      Pulse: 98         General appearance:  Alert, well-appearing, and in no distress.  Oriented to person, place, and time                    Neurological: Normal speech, no focal findings noted; CN II - XII grossly intact. RLE with sensation to light touch, LLE with sensation to light touch.            Musculoskeletal: Digits/nail without cyanosis/clubbing.  Strength 5/5 BLE.                    Neck: Supple, no significant adenopathy                  Chest:  No wheezes, symmetric air entry. No use of accessory muscles               Cardiac: Normal rate and regular rhythm            Abdomen: Soft, nontender, nondistended      Extremities:   2+ R DP pulse, 2+ L DP pulse      1+ RLE edema, 1+ LLE edema    Skin:  RLE no ulcer; LLE no ulcer      RLE + spider veins, LLE no spider veins      RLE + varicose veins, LLE no varicose veins    CEAP 3/3        LAB RESULTS:  No results found for: CBC  No results found for: LABPROT, INR  Lab Results   Component Value Date     (L) 06/24/2022    K 3.9 06/24/2022     06/24/2022    CO2 27 06/24/2022     06/24/2022    BUN 8 06/24/2022    CREATININE 0.6 06/24/2022    CALCIUM 9.2 06/24/2022    ANIONGAP 8 06/24/2022    EGFRNONAA >60.0 06/24/2022     Lab Results   Component Value Date    WBC 8.13 06/24/2022    RBC 4.16 06/24/2022    HGB 14.4 06/24/2022    HCT 41.8 06/24/2022     (H) 06/24/2022    MCH 34.6 (H) 06/24/2022    MCHC 34.4 06/24/2022    RDW 12.4 06/24/2022     06/24/2022    MPV 9.6 06/24/2022    GRAN 5.6 06/24/2022    GRAN 68.9  06/24/2022    LYMPH 1.8 06/24/2022    LYMPH 22.0 06/24/2022    MONO 0.5 06/24/2022    MONO 6.3 06/24/2022    EOS 0.2 06/24/2022    BASO 0.04 06/24/2022    EOSINOPHIL 2.1 06/24/2022    BASOPHIL 0.5 06/24/2022    DIFFMETHOD Automated 06/24/2022     .No results found for: HGBA1C    IMAGING:  All pertinent imaging has been reviewed and interpreted independently.    Venous US 2021 reviewed.    5/2022 venous US  [phone] 5/16/2022 Routine     Narrative & Impression  EXAMINATION:  US LOWER EXTREMITY VENOUS INSUFFICIENCY RIGHT     CLINICAL HISTORY:  Varicose veins of bilateral lower extremities with other complications     TECHNIQUE:  Duplex and color flow Doppler evaluation and graded compression of the right lower extremity veins was performed.  Dynamic ultrasound of the superficial right lower extremity veins was also performed     COMPARISON:  None     FINDINGS:  No evidence of DVT in the right lower extremity deep venous system.     The right greater saphenous vein has maximum diameter of 4.5 mm.  No evidence of hemodynamically significant reflux identified on today's exam.  The lesser saphenous vein has maximum diameter of 3.1 mm without hemodynamically significant reflux.  The anterior accessory saphenous vein has maximum diameter of 2.8 mm without hemodynamically significant reflux.     Impression:     No evidence of deep venous thrombosis in the right lower extremity.     No hemodynamically significant reflux identified in the superficial veins of the right lower extremity.       IMP/PLAN:  41 y.o. female with   Patient Active Problem List   Diagnosis    Hereditary hemochromatosis    Advanced maternal age, primigravida, antepartum    Cervical high risk human papillomavirus (HPV) DNA test positive    Varicose veins of lower extremity during pregnancy in third trimester    History of VSD, s/p repair at age 2y    Pregnancy complicated by fetal congenital heart disease, VSD    Adult congenital heart disease     Liver lesion    being managed by PCP and specialists who is here today for evaluation of RLE edema and varicose veins during pregnancy.    -recommend cont compression with Rx stockings, elevation, dietary changes associated with water and sodium intake discussed at length with patient  -Exercise   -Rec RLE stab phlebectomy and sclerotherapy    I spent 12 minutes evaluating this patient and greater than 50% of the time was spent counseling, coordinator care and discussing the plan of care.  All questions were answered and patient stated understanding with agreement with the above treatment plan.    Ricky Campbell MD [facility]  Vascular and Endovascular Surgery

## 2022-07-28 ENCOUNTER — OFFICE VISIT (OUTPATIENT)
Dept: HEMATOLOGY/ONCOLOGY | Facility: CLINIC | Age: 42
End: 2022-07-28
Payer: COMMERCIAL

## 2022-07-28 DIAGNOSIS — E83.110 HEREDITARY HEMOCHROMATOSIS: Primary | ICD-10-CM

## 2022-07-28 PROCEDURE — 99215 PR OFFICE/OUTPT VISIT, EST, LEVL V, 40-54 MIN: ICD-10-PCS | Mod: 95,,, | Performed by: INTERNAL MEDICINE

## 2022-07-28 PROCEDURE — 99215 OFFICE O/P EST HI 40 MIN: CPT | Mod: 95,,, | Performed by: INTERNAL MEDICINE

## 2022-07-28 NOTE — PROGRESS NOTES
Hematology and Medical Oncology   Follow Up     07/28/2022    Reason For Referral: Hemochromatosis    Telemedicine Documentation:  The patient location is: home  The chief complaint leading to consultation is: hemochromatosis    Visit type: audiovisual    Face to Face time with patient: 25  35 minutes of total time spent on the encounter, which includes face to face time and non-face to face time preparing to see the patient (eg, review of tests), Obtaining and/or reviewing separately obtained history, Documenting clinical information in the electronic or other health record, Independently interpreting results (not separately reported) and communicating results to the patient/family/caregiver, or Care coordination (not separately reported).     Each patient to whom he or she provides medical services by telemedicine is:  (1) informed of the relationship between the physician and patient and the respective role of any other health care provider with respect to management of the patient; and (2) notified that he or she may decline to receive medical services by telemedicine and may withdraw from such care at any time.      History of Present Ilness:   Danielle Nelson (Danielle) is a pleasant 41 y.o.female who presents virtually to discuss her hemochromatosis and need for labs.      2012 or 2013 labs at [facility] to diagnose hemochromatosis  Did phlebotomy to get ferritin from 300 --> less than 50     Last one is February 2019    VSD of heart   Kids are 9 months and 27 months      MRI this afternoon     Iron: 234  Ferritin: 102    Interval History:  Did 2 phlebotomies over the summer without difficulty. Happy to see the results of her recent lab work with Ferritin reduced by nearly 50%. Remains asymptomatic.         PAST MEDICAL HISTORY:   Past Medical History:   Diagnosis Date    Hemochromatosis        PAST SURGICAL HISTORY:   Past Surgical History:   Procedure Laterality Date    KNEE SURGERY Right 07/2018     VSD REPAIR      3yo (1982)         PAST SOCIAL HISTORY:   reports that she has never smoked. She has never used smokeless tobacco. She reports previous alcohol use. She reports that she does not use drugs.    FAMILY HISTORY:  Family History   Problem Relation Age of Onset    Breast cancer Neg Hx     Colon cancer Neg Hx     Ovarian cancer Neg Hx     Arrhythmia Neg Hx     Cardiomyopathy Neg Hx     Congenital heart disease Neg Hx     Early death Neg Hx     Heart attacks under age 50 Neg Hx     Hypertension Neg Hx     Long QT syndrome Neg Hx     Pacemaker/defibrilator Neg Hx        CURRENT MEDICATIONS:   Current Outpatient Medications   Medication Sig    ALPRAZolam (XANAX) 0.5 MG tablet Take 0.5 mg by mouth daily as needed.    cholecalciferol, vitamin D3, 1,250 mcg (50,000 unit) capsule Take 50,000 Units by mouth every 7 days.    dextroamphetamine-amphetamine (ADDERALL) 20 mg tablet TAKE 1 TABLET BY MOUTH TWICE DAILY TO CONTROL ADD    drospirenone-ethinyl estradioL (AYDEN, 28,) 3-0.03 mg per tablet Take 1 tablet by mouth once daily.    ergocalciferol (ERGOCALCIFEROL) 50,000 unit Cap TAKE 1 CAPSULE BY MOUTH ONCE A WEEK FOR 3 MONTHS. THEN START OTC VITAMIND    ibuprofen (ADVIL,MOTRIN) 600 MG tablet Take 1 tablet (600 mg total) by mouth every 8 (eight) hours as needed for Pain.    magnesium oxide (MAG-OX) 400 mg (241.3 mg magnesium) tablet Take 1 tablet by mouth 2 (two) times daily.    oxyCODONE-acetaminophen (PERCOCET) 5-325 mg per tablet Take 1 tablet by mouth every 4 (four) hours as needed for Pain.    valACYclovir (VALTREX) 500 MG tablet PRN    zolpidem (AMBIEN) 10 mg Tab Take 5 mg by mouth nightly as needed.     No current facility-administered medications for this visit.     ALLERGIES:   Review of patient's allergies indicates:  No Known Allergies      Review of Systems:     Review of Systems   Constitutional: Negative for appetite change and unexpected weight change.   HENT:   Negative for  mouth sores.    Respiratory: Negative for cough and shortness of breath.    Cardiovascular: Negative for chest pain.   Gastrointestinal: Negative for abdominal pain and diarrhea.   Genitourinary: Negative for frequency.    Musculoskeletal: Negative for back pain.   Skin: Negative for rash.   Neurological: Negative for headaches.   Hematological: Negative for adenopathy.   Psychiatric/Behavioral: The patient is not nervous/anxious.           Physical Exam:   Limited secondary to virtual visit    ECOG Performance Status: (foot note - ECOG PS provided by Eastern Cooperative Oncology Group) 0 - Asymptomatic    Karnofsky Performance Score:  100%- Normal, No Complaints, No Evidence of Disease      Labs:   Lab Results   Component Value Date    WBC 8.13 06/24/2022    HGB 14.4 06/24/2022    HCT 41.8 06/24/2022     06/24/2022    ALT 14 06/24/2022    AST 17 06/24/2022     (L) 06/24/2022    K 3.9 06/24/2022     06/24/2022    CREATININE 0.6 06/24/2022    BUN 8 06/24/2022    CO2 27 06/24/2022       Imaging: Previous imaging has been reviewed     MRI Abdomen: Liver R2* value of 66 Hz consistent with borderline mild iron overload (LIC estimated at 3.52 mg/g).      Assessment and Plan:     Mrs. Theodore is a pleasant 41 year old female who presents virtually to discuss labs and plans for future phlebotomy      Hereditary hemochromatosis  --plan for PRN phlebotomies   --recheck Ferritin q 3months  --MRI reveals minimum iron deposition      25 minutes were spent face to face with the patient to discuss the disease, natural history, treatment options and survival statistics. I have provided the patient with an opportunity to ask questions and have all questions answered to her satisfaction.       she will return to clinic in 3 months, but knows to call in the interim if symptoms change or should a problem arise.        Ann Urias MD  Hematology and Medical Oncology  Bone Marrow Transplant  [facility]        BMT Chart Routing      Follow up with physician 3 months. 1. labs and see me in 3 months: cbc,cmp, iron, ferritin (visit can be inperson or virtual)   Follow up with KAREN    Infusion scheduling note    Injection scheduling note    Labs CBC, CMP, iron and TIBC and ferritin   Lab interval:     Imaging None      Pharmacy appointment No pharmacy appointment needed      Other referrals No additional referrals needed

## 2022-09-23 NOTE — TELEPHONE ENCOUNTER
Patient has new patient appointment on Tuesday 9/;27.  Calling to let us know that she tested positive for Covid on Wednesday.  Would like to discuss what should she do.  Please call with guidance.   Spoke with patient. Iron low again. She has been taking prenatal only over the past 2 months. Asked her to take extra iron 65 mg once daily. Will repeat labs in one month. She is due on 12/13/19. Patient understands and agrees with the plan.

## 2022-10-28 ENCOUNTER — PATIENT MESSAGE (OUTPATIENT)
Dept: HEMATOLOGY/ONCOLOGY | Facility: CLINIC | Age: 42
End: 2022-10-28
Payer: COMMERCIAL

## 2022-11-02 ENCOUNTER — PATIENT MESSAGE (OUTPATIENT)
Dept: HEPATOLOGY | Facility: CLINIC | Age: 42
End: 2022-11-02
Payer: COMMERCIAL

## 2022-11-02 ENCOUNTER — PATIENT MESSAGE (OUTPATIENT)
Dept: HEMATOLOGY/ONCOLOGY | Facility: CLINIC | Age: 42
End: 2022-11-02
Payer: COMMERCIAL

## 2022-11-02 DIAGNOSIS — E83.19 IRON OVERLOAD: Primary | ICD-10-CM

## 2022-11-02 DIAGNOSIS — K76.9 LIVER LESION: ICD-10-CM

## 2022-11-08 ENCOUNTER — TELEPHONE (OUTPATIENT)
Dept: HEMATOLOGY/ONCOLOGY | Facility: CLINIC | Age: 42
End: 2022-11-08
Payer: COMMERCIAL

## 2022-11-10 ENCOUNTER — TELEPHONE (OUTPATIENT)
Dept: HEMATOLOGY/ONCOLOGY | Facility: CLINIC | Age: 42
End: 2022-11-10
Payer: COMMERCIAL

## 2022-11-18 ENCOUNTER — LAB VISIT (OUTPATIENT)
Dept: LAB | Facility: HOSPITAL | Age: 42
End: 2022-11-18
Attending: INTERNAL MEDICINE
Payer: COMMERCIAL

## 2022-11-18 ENCOUNTER — OFFICE VISIT (OUTPATIENT)
Dept: HEMATOLOGY/ONCOLOGY | Facility: CLINIC | Age: 42
End: 2022-11-18
Payer: COMMERCIAL

## 2022-11-18 ENCOUNTER — PATIENT MESSAGE (OUTPATIENT)
Dept: HEMATOLOGY/ONCOLOGY | Facility: CLINIC | Age: 42
End: 2022-11-18

## 2022-11-18 VITALS
HEIGHT: 65 IN | BODY MASS INDEX: 23.25 KG/M2 | SYSTOLIC BLOOD PRESSURE: 105 MMHG | DIASTOLIC BLOOD PRESSURE: 66 MMHG | HEART RATE: 71 BPM | WEIGHT: 139.56 LBS | RESPIRATION RATE: 16 BRPM | OXYGEN SATURATION: 100 %

## 2022-11-18 DIAGNOSIS — Q24.9 ADULT CONGENITAL HEART DISEASE: ICD-10-CM

## 2022-11-18 DIAGNOSIS — E83.110 HEREDITARY HEMOCHROMATOSIS: ICD-10-CM

## 2022-11-18 DIAGNOSIS — E83.110 HEREDITARY HEMOCHROMATOSIS: Primary | ICD-10-CM

## 2022-11-18 LAB
ALBUMIN SERPL BCP-MCNC: 4 G/DL (ref 3.5–5.2)
ALP SERPL-CCNC: 59 U/L (ref 55–135)
ALT SERPL W/O P-5'-P-CCNC: 17 U/L (ref 10–44)
ANION GAP SERPL CALC-SCNC: 9 MMOL/L (ref 8–16)
AST SERPL-CCNC: 23 U/L (ref 10–40)
BASOPHILS # BLD AUTO: 0.03 K/UL (ref 0–0.2)
BASOPHILS NFR BLD: 0.6 % (ref 0–1.9)
BILIRUB SERPL-MCNC: 0.3 MG/DL (ref 0.1–1)
BUN SERPL-MCNC: 6 MG/DL (ref 6–20)
CALCIUM SERPL-MCNC: 9.4 MG/DL (ref 8.7–10.5)
CHLORIDE SERPL-SCNC: 104 MMOL/L (ref 95–110)
CO2 SERPL-SCNC: 26 MMOL/L (ref 23–29)
CREAT SERPL-MCNC: 0.7 MG/DL (ref 0.5–1.4)
DIFFERENTIAL METHOD: ABNORMAL
EOSINOPHIL # BLD AUTO: 0.1 K/UL (ref 0–0.5)
EOSINOPHIL NFR BLD: 1.5 % (ref 0–8)
ERYTHROCYTE [DISTWIDTH] IN BLOOD BY AUTOMATED COUNT: 12.3 % (ref 11.5–14.5)
EST. GFR  (NO RACE VARIABLE): >60 ML/MIN/1.73 M^2
FERRITIN SERPL-MCNC: 73 NG/ML (ref 20–300)
GLUCOSE SERPL-MCNC: 101 MG/DL (ref 70–110)
HCT VFR BLD AUTO: 38.8 % (ref 37–48.5)
HGB BLD-MCNC: 12.9 G/DL (ref 12–16)
IMM GRANULOCYTES # BLD AUTO: 0.01 K/UL (ref 0–0.04)
IMM GRANULOCYTES NFR BLD AUTO: 0.2 % (ref 0–0.5)
IRON SERPL-MCNC: 106 UG/DL (ref 30–160)
LYMPHOCYTES # BLD AUTO: 1.8 K/UL (ref 1–4.8)
LYMPHOCYTES NFR BLD: 34.5 % (ref 18–48)
MCH RBC QN AUTO: 34.5 PG (ref 27–31)
MCHC RBC AUTO-ENTMCNC: 33.2 G/DL (ref 32–36)
MCV RBC AUTO: 104 FL (ref 82–98)
MONOCYTES # BLD AUTO: 0.3 K/UL (ref 0.3–1)
MONOCYTES NFR BLD: 6.3 % (ref 4–15)
NEUTROPHILS # BLD AUTO: 3 K/UL (ref 1.8–7.7)
NEUTROPHILS NFR BLD: 56.9 % (ref 38–73)
NRBC BLD-RTO: 0 /100 WBC
PLATELET # BLD AUTO: 263 K/UL (ref 150–450)
PMV BLD AUTO: 9.8 FL (ref 9.2–12.9)
POTASSIUM SERPL-SCNC: 3.7 MMOL/L (ref 3.5–5.1)
PROT SERPL-MCNC: 6.7 G/DL (ref 6–8.4)
RBC # BLD AUTO: 3.74 M/UL (ref 4–5.4)
SATURATED IRON: 40 % (ref 20–50)
SODIUM SERPL-SCNC: 139 MMOL/L (ref 136–145)
TOTAL IRON BINDING CAPACITY: 268 UG/DL (ref 250–450)
TRANSFERRIN SERPL-MCNC: 181 MG/DL (ref 200–375)
WBC # BLD AUTO: 5.21 K/UL (ref 3.9–12.7)

## 2022-11-18 PROCEDURE — 3008F BODY MASS INDEX DOCD: CPT | Mod: CPTII,S$GLB,, | Performed by: INTERNAL MEDICINE

## 2022-11-18 PROCEDURE — 99999 PR PBB SHADOW E&M-EST. PATIENT-LVL III: CPT | Mod: PBBFAC,,, | Performed by: INTERNAL MEDICINE

## 2022-11-18 PROCEDURE — 3008F PR BODY MASS INDEX (BMI) DOCUMENTED: ICD-10-PCS | Mod: CPTII,S$GLB,, | Performed by: INTERNAL MEDICINE

## 2022-11-18 PROCEDURE — 85025 COMPLETE CBC W/AUTO DIFF WBC: CPT | Performed by: INTERNAL MEDICINE

## 2022-11-18 PROCEDURE — 99215 OFFICE O/P EST HI 40 MIN: CPT | Mod: S$GLB,,, | Performed by: INTERNAL MEDICINE

## 2022-11-18 PROCEDURE — 3074F PR MOST RECENT SYSTOLIC BLOOD PRESSURE < 130 MM HG: ICD-10-PCS | Mod: CPTII,S$GLB,, | Performed by: INTERNAL MEDICINE

## 2022-11-18 PROCEDURE — 99215 PR OFFICE/OUTPT VISIT, EST, LEVL V, 40-54 MIN: ICD-10-PCS | Mod: S$GLB,,, | Performed by: INTERNAL MEDICINE

## 2022-11-18 PROCEDURE — 3078F DIAST BP <80 MM HG: CPT | Mod: CPTII,S$GLB,, | Performed by: INTERNAL MEDICINE

## 2022-11-18 PROCEDURE — 3078F PR MOST RECENT DIASTOLIC BLOOD PRESSURE < 80 MM HG: ICD-10-PCS | Mod: CPTII,S$GLB,, | Performed by: INTERNAL MEDICINE

## 2022-11-18 PROCEDURE — 84466 ASSAY OF TRANSFERRIN: CPT | Performed by: INTERNAL MEDICINE

## 2022-11-18 PROCEDURE — 82728 ASSAY OF FERRITIN: CPT | Performed by: INTERNAL MEDICINE

## 2022-11-18 PROCEDURE — 99999 PR PBB SHADOW E&M-EST. PATIENT-LVL III: ICD-10-PCS | Mod: PBBFAC,,, | Performed by: INTERNAL MEDICINE

## 2022-11-18 PROCEDURE — 36415 COLL VENOUS BLD VENIPUNCTURE: CPT | Performed by: INTERNAL MEDICINE

## 2022-11-18 PROCEDURE — 80053 COMPREHEN METABOLIC PANEL: CPT | Performed by: INTERNAL MEDICINE

## 2022-11-18 PROCEDURE — 3074F SYST BP LT 130 MM HG: CPT | Mod: CPTII,S$GLB,, | Performed by: INTERNAL MEDICINE

## 2022-11-18 NOTE — PROGRESS NOTES
Hematology and Medical Oncology   Follow Up Note     11/18/2022      Primary Hematologic Diagnosis: Hemochromatosis      History of Present Ilness:   Danielle Nelson (Danielle) is a pleasant 42 y.o.female to discuss her hemochromatosis and need for labs.        2012 or 2013 labs at [facility] to diagnose hemochromatosis  Did phlebotomy to get ferritin from 300 --> less than 50     Last one is February 2019     VSD of heart      Iron: 234  Ferritin: 102     Interval History:  Did 2 phlebotomies over the summer and 1 earlier this week [record was sent via my chart]. She donated without difficulty. Happy to see the results of her recent lab work with Ferritin remains grossly stable. Remains asymptomatic.     Past Medical History:   Past Medical History:   Diagnosis Date    Hemochromatosis        Current Medications:   Current Outpatient Medications   Medication Sig    ALPRAZolam (XANAX) 0.5 MG tablet Take 0.5 mg by mouth daily as needed.    cholecalciferol, vitamin D3, 1,250 mcg (50,000 unit) capsule Take 50,000 Units by mouth every 7 days.    dextroamphetamine-amphetamine (ADDERALL) 20 mg tablet TAKE 1 TABLET BY MOUTH TWICE DAILY TO CONTROL ADD    drospirenone-ethinyl estradioL (AYDEN, 28,) 3-0.03 mg per tablet Take 1 tablet by mouth once daily.    ergocalciferol (ERGOCALCIFEROL) 50,000 unit Cap TAKE 1 CAPSULE BY MOUTH ONCE A WEEK FOR 3 MONTHS. THEN START OTC VITAMIND    ibuprofen (ADVIL,MOTRIN) 600 MG tablet Take 1 tablet (600 mg total) by mouth every 8 (eight) hours as needed for Pain.    magnesium oxide (MAG-OX) 400 mg (241.3 mg magnesium) tablet Take 1 tablet by mouth 2 (two) times daily.    oxyCODONE-acetaminophen (PERCOCET) 5-325 mg per tablet Take 1 tablet by mouth every 4 (four) hours as needed for Pain.    valACYclovir (VALTREX) 500 MG tablet PRN    zolpidem (AMBIEN) 10 mg Tab Take 5 mg by mouth nightly as needed.     No current facility-administered medications for this visit.     ALLERGIES:   Review  of patient's allergies indicates:  No Known Allergies    Review of Systems:     Review of Systems   Constitutional:  Negative for appetite change, chills, diaphoresis, fatigue, fever and unexpected weight change.   HENT:   Negative for hearing loss, mouth sores, nosebleeds, sore throat, trouble swallowing and voice change.    Eyes:  Negative for eye problems and icterus.   Respiratory:  Negative for chest tightness, cough, hemoptysis, shortness of breath and wheezing.    Cardiovascular:  Negative for chest pain, leg swelling and palpitations.   Gastrointestinal:  Negative for abdominal distention, abdominal pain, blood in stool, diarrhea, nausea and vomiting.   Endocrine: Negative for hot flashes.   Genitourinary:  Negative for bladder incontinence, difficulty urinating, dysuria and hematuria.    Musculoskeletal:  Negative for arthralgias, back pain, flank pain, gait problem, myalgias, neck pain and neck stiffness.   Skin:  Negative for itching, rash and wound.   Neurological:  Negative for dizziness, extremity weakness, gait problem, headaches, numbness, seizures and speech difficulty.   Hematological:  Negative for adenopathy. Does not bruise/bleed easily.   Psychiatric/Behavioral:  Negative for confusion, depression and sleep disturbance. The patient is not nervous/anxious.         Physical Exam:     Vitals:    11/18/22 1342   BP: 105/66   Pulse: 71   Resp: 16     Physical Exam  Constitutional:       General: She is not in acute distress.     Appearance: She is well-developed. She is not diaphoretic.   HENT:      Head: Normocephalic and atraumatic.      Mouth/Throat:      Pharynx: No oropharyngeal exudate.   Eyes:      Conjunctiva/sclera: Conjunctivae normal.      Pupils: Pupils are equal, round, and reactive to light.   Neck:      Thyroid: No thyromegaly.      Vascular: No JVD.      Trachea: No tracheal deviation.   Cardiovascular:      Rate and Rhythm: Normal rate and regular rhythm.      Heart sounds: Normal  heart sounds. No murmur heard.    No friction rub.   Pulmonary:      Effort: Pulmonary effort is normal. No respiratory distress.      Breath sounds: Normal breath sounds. No stridor. No wheezing or rales.   Chest:      Chest wall: No tenderness.   Abdominal:      General: Bowel sounds are normal. There is no distension.      Palpations: Abdomen is soft.      Tenderness: There is no abdominal tenderness. There is no guarding or rebound.   Musculoskeletal:         General: No tenderness or deformity. Normal range of motion.      Cervical back: Normal range of motion and neck supple.   Skin:     General: Skin is warm and dry.      Capillary Refill: Capillary refill takes less than 2 seconds.      Coloration: Skin is not pale.      Findings: No erythema or rash.   Neurological:      Mental Status: She is alert and oriented to person, place, and time.      Cranial Nerves: No cranial nerve deficit.      Sensory: No sensory deficit.      Motor: No abnormal muscle tone.      Coordination: Coordination normal.      Deep Tendon Reflexes: Reflexes normal.   Psychiatric:         Behavior: Behavior normal.         Thought Content: Thought content normal.         Judgment: Judgment normal.       ECOG Performance Status: (foot note - ECOG PS provided by Eastern Cooperative Oncology Group) 0 - Asymptomatic    Karnofsky Performance Score:  100%- Normal, No Complaints, No Evidence of Disease    Labs:   Lab Results   Component Value Date    WBC 5.21 11/18/2022    HGB 12.9 11/18/2022    HCT 38.8 11/18/2022     11/18/2022    ALT 17 11/18/2022    AST 23 11/18/2022     11/18/2022    K 3.7 11/18/2022     11/18/2022    CREATININE 0.7 11/18/2022    BUN 6 11/18/2022    CO2 26 11/18/2022     Iron: 106  TIBC: 268  Ferritin: 73        Imaging: Previous imaging has been reviewed     Assessment and Plan:   Mrs. Nelson is a pleasant 42 year old with Hemochromatosis        Hereditary hemochromatosis  --plan for PRN phlebotomies.  She will continue to send documentation of the procedures performed  --recheck Ferritin q 3months  --MRI reveals minimum iron deposition        25 minutes were spent face to face with the patient to discuss the disease, natural history, treatment options and survival statistics. I have provided the patient with an opportunity to ask questions and have all questions answered to her satisfaction.         she will return to clinic in 3 months, but knows to call in the interim if symptoms change or should a problem arise.      Ann Urias MD  Hematology and Medical Oncology  Bone Marrow Transplant  [facility]      BMT Chart Routing      Follow up with physician 3 months. 1. see me in 3-4 months with cbc,cmp, iron, ferritin [okay to overbook me]   Follow up with KAREN    Provider visit type    Infusion scheduling note    Injection scheduling note    Labs CBC, CMP, ferritin and iron and TIBC   Lab interval:     Imaging    Pharmacy appointment    Other referrals

## 2022-11-21 ENCOUNTER — PATIENT MESSAGE (OUTPATIENT)
Dept: HEPATOLOGY | Facility: CLINIC | Age: 42
End: 2022-11-21
Payer: COMMERCIAL

## 2022-12-30 ENCOUNTER — PATIENT MESSAGE (OUTPATIENT)
Dept: HEPATOLOGY | Facility: CLINIC | Age: 42
End: 2022-12-30
Payer: COMMERCIAL

## 2022-12-30 ENCOUNTER — PATIENT MESSAGE (OUTPATIENT)
Dept: VASCULAR SURGERY | Facility: CLINIC | Age: 42
End: 2022-12-30
Payer: COMMERCIAL

## 2022-12-30 ENCOUNTER — TELEPHONE (OUTPATIENT)
Dept: HEPATOLOGY | Facility: CLINIC | Age: 42
End: 2022-12-30
Payer: COMMERCIAL

## 2022-12-30 ENCOUNTER — PATIENT MESSAGE (OUTPATIENT)
Dept: HEMATOLOGY/ONCOLOGY | Facility: CLINIC | Age: 42
End: 2022-12-30
Payer: COMMERCIAL

## 2022-12-30 DIAGNOSIS — E83.19 IRON OVERLOAD: ICD-10-CM

## 2022-12-30 DIAGNOSIS — K76.9 LIVER LESION: Primary | ICD-10-CM

## 2022-12-30 NOTE — TELEPHONE ENCOUNTER
Returned the call and rescheduled her MRI MRE and labs----- Message from Leatha Christy MA sent at 12/30/2022  1:29 PM CST -----  Type:  Patient Returning Call    Who Called: pt  Does the patient know what this is regarding?: yes  Would the patient rather a call back or a response via [facility]? Call back  Best Call Back Number: [phone]  Additional Information:  please contact pt regarding orders for MRI

## 2023-01-10 ENCOUNTER — PATIENT MESSAGE (OUTPATIENT)
Dept: HEMATOLOGY/ONCOLOGY | Facility: CLINIC | Age: 43
End: 2023-01-10
Payer: COMMERCIAL

## 2023-01-23 ENCOUNTER — LAB VISIT (OUTPATIENT)
Dept: LAB | Facility: HOSPITAL | Age: 43
End: 2023-01-23
Attending: INTERNAL MEDICINE
Payer: COMMERCIAL

## 2023-01-23 DIAGNOSIS — E83.19 IRON OVERLOAD: ICD-10-CM

## 2023-01-23 DIAGNOSIS — E83.110 HEREDITARY HEMOCHROMATOSIS: ICD-10-CM

## 2023-01-23 DIAGNOSIS — K76.9 LIVER LESION: ICD-10-CM

## 2023-01-23 LAB
AFP SERPL-MCNC: <2 NG/ML (ref 0–8.4)
ALBUMIN SERPL BCP-MCNC: 4.2 G/DL (ref 3.5–5.2)
ALBUMIN SERPL BCP-MCNC: 4.2 G/DL (ref 3.5–5.2)
ALP SERPL-CCNC: 70 U/L (ref 55–135)
ALP SERPL-CCNC: 70 U/L (ref 55–135)
ALT SERPL W/O P-5'-P-CCNC: 18 U/L (ref 10–44)
ALT SERPL W/O P-5'-P-CCNC: 18 U/L (ref 10–44)
ANION GAP SERPL CALC-SCNC: 11 MMOL/L (ref 8–16)
ANION GAP SERPL CALC-SCNC: 11 MMOL/L (ref 8–16)
AST SERPL-CCNC: 23 U/L (ref 10–40)
AST SERPL-CCNC: 23 U/L (ref 10–40)
BASOPHILS # BLD AUTO: 0.04 K/UL (ref 0–0.2)
BASOPHILS # BLD AUTO: 0.04 K/UL (ref 0–0.2)
BASOPHILS NFR BLD: 0.6 % (ref 0–1.9)
BASOPHILS NFR BLD: 0.6 % (ref 0–1.9)
BILIRUB SERPL-MCNC: 1 MG/DL (ref 0.1–1)
BILIRUB SERPL-MCNC: 1 MG/DL (ref 0.1–1)
BUN SERPL-MCNC: 11 MG/DL (ref 6–20)
BUN SERPL-MCNC: 11 MG/DL (ref 6–20)
CALCIUM SERPL-MCNC: 10.1 MG/DL (ref 8.7–10.5)
CALCIUM SERPL-MCNC: 10.1 MG/DL (ref 8.7–10.5)
CHLORIDE SERPL-SCNC: 100 MMOL/L (ref 95–110)
CHLORIDE SERPL-SCNC: 100 MMOL/L (ref 95–110)
CO2 SERPL-SCNC: 24 MMOL/L (ref 23–29)
CO2 SERPL-SCNC: 24 MMOL/L (ref 23–29)
CREAT SERPL-MCNC: 0.7 MG/DL (ref 0.5–1.4)
CREAT SERPL-MCNC: 0.7 MG/DL (ref 0.5–1.4)
DIFFERENTIAL METHOD: ABNORMAL
DIFFERENTIAL METHOD: ABNORMAL
EOSINOPHIL # BLD AUTO: 0.1 K/UL (ref 0–0.5)
EOSINOPHIL # BLD AUTO: 0.1 K/UL (ref 0–0.5)
EOSINOPHIL NFR BLD: 1.3 % (ref 0–8)
EOSINOPHIL NFR BLD: 1.3 % (ref 0–8)
ERYTHROCYTE [DISTWIDTH] IN BLOOD BY AUTOMATED COUNT: 12.7 % (ref 11.5–14.5)
ERYTHROCYTE [DISTWIDTH] IN BLOOD BY AUTOMATED COUNT: 12.7 % (ref 11.5–14.5)
EST. GFR  (NO RACE VARIABLE): >60 ML/MIN/1.73 M^2
EST. GFR  (NO RACE VARIABLE): >60 ML/MIN/1.73 M^2
FERRITIN SERPL-MCNC: 84 NG/ML (ref 20–300)
FERRITIN SERPL-MCNC: 84 NG/ML (ref 20–300)
GLUCOSE SERPL-MCNC: 100 MG/DL (ref 70–110)
GLUCOSE SERPL-MCNC: 100 MG/DL (ref 70–110)
HCT VFR BLD AUTO: 42.8 % (ref 37–48.5)
HCT VFR BLD AUTO: 42.8 % (ref 37–48.5)
HGB BLD-MCNC: 14.3 G/DL (ref 12–16)
HGB BLD-MCNC: 14.3 G/DL (ref 12–16)
IMM GRANULOCYTES # BLD AUTO: 0.02 K/UL (ref 0–0.04)
IMM GRANULOCYTES # BLD AUTO: 0.02 K/UL (ref 0–0.04)
IMM GRANULOCYTES NFR BLD AUTO: 0.3 % (ref 0–0.5)
IMM GRANULOCYTES NFR BLD AUTO: 0.3 % (ref 0–0.5)
INR PPP: 1 (ref 0.8–1.2)
IRON SERPL-MCNC: 259 UG/DL (ref 30–160)
IRON SERPL-MCNC: 259 UG/DL (ref 30–160)
LYMPHOCYTES # BLD AUTO: 1.7 K/UL (ref 1–4.8)
LYMPHOCYTES # BLD AUTO: 1.7 K/UL (ref 1–4.8)
LYMPHOCYTES NFR BLD: 25.6 % (ref 18–48)
LYMPHOCYTES NFR BLD: 25.6 % (ref 18–48)
MCH RBC QN AUTO: 34.3 PG (ref 27–31)
MCH RBC QN AUTO: 34.3 PG (ref 27–31)
MCHC RBC AUTO-ENTMCNC: 33.4 G/DL (ref 32–36)
MCHC RBC AUTO-ENTMCNC: 33.4 G/DL (ref 32–36)
MCV RBC AUTO: 103 FL (ref 82–98)
MCV RBC AUTO: 103 FL (ref 82–98)
MONOCYTES # BLD AUTO: 0.3 K/UL (ref 0.3–1)
MONOCYTES # BLD AUTO: 0.3 K/UL (ref 0.3–1)
MONOCYTES NFR BLD: 5 % (ref 4–15)
MONOCYTES NFR BLD: 5 % (ref 4–15)
NEUTROPHILS # BLD AUTO: 4.6 K/UL (ref 1.8–7.7)
NEUTROPHILS # BLD AUTO: 4.6 K/UL (ref 1.8–7.7)
NEUTROPHILS NFR BLD: 67.2 % (ref 38–73)
NEUTROPHILS NFR BLD: 67.2 % (ref 38–73)
NRBC BLD-RTO: 0 /100 WBC
NRBC BLD-RTO: 0 /100 WBC
PLATELET # BLD AUTO: 309 K/UL (ref 150–450)
PLATELET # BLD AUTO: 309 K/UL (ref 150–450)
PMV BLD AUTO: 10.3 FL (ref 9.2–12.9)
PMV BLD AUTO: 10.3 FL (ref 9.2–12.9)
POTASSIUM SERPL-SCNC: 3.8 MMOL/L (ref 3.5–5.1)
POTASSIUM SERPL-SCNC: 3.8 MMOL/L (ref 3.5–5.1)
PROT SERPL-MCNC: 7.7 G/DL (ref 6–8.4)
PROT SERPL-MCNC: 7.7 G/DL (ref 6–8.4)
PROTHROMBIN TIME: 9.9 SEC (ref 9–12.5)
RBC # BLD AUTO: 4.17 M/UL (ref 4–5.4)
RBC # BLD AUTO: 4.17 M/UL (ref 4–5.4)
SATURATED IRON: 92 % (ref 20–50)
SATURATED IRON: 92 % (ref 20–50)
SODIUM SERPL-SCNC: 135 MMOL/L (ref 136–145)
SODIUM SERPL-SCNC: 135 MMOL/L (ref 136–145)
TOTAL IRON BINDING CAPACITY: 283 UG/DL (ref 250–450)
TOTAL IRON BINDING CAPACITY: 283 UG/DL (ref 250–450)
TRANSFERRIN SERPL-MCNC: 191 MG/DL (ref 200–375)
TRANSFERRIN SERPL-MCNC: 191 MG/DL (ref 200–375)
WBC # BLD AUTO: 6.77 K/UL (ref 3.9–12.7)
WBC # BLD AUTO: 6.77 K/UL (ref 3.9–12.7)

## 2023-01-23 PROCEDURE — 82728 ASSAY OF FERRITIN: CPT | Performed by: INTERNAL MEDICINE

## 2023-01-23 PROCEDURE — 82105 ALPHA-FETOPROTEIN SERUM: CPT | Performed by: INTERNAL MEDICINE

## 2023-01-23 PROCEDURE — 80053 COMPREHEN METABOLIC PANEL: CPT | Performed by: INTERNAL MEDICINE

## 2023-01-23 PROCEDURE — 85610 PROTHROMBIN TIME: CPT | Performed by: INTERNAL MEDICINE

## 2023-01-23 PROCEDURE — 36415 COLL VENOUS BLD VENIPUNCTURE: CPT | Mod: PN | Performed by: INTERNAL MEDICINE

## 2023-01-23 PROCEDURE — 84466 ASSAY OF TRANSFERRIN: CPT | Performed by: INTERNAL MEDICINE

## 2023-01-23 PROCEDURE — 85025 COMPLETE CBC W/AUTO DIFF WBC: CPT | Performed by: INTERNAL MEDICINE

## 2023-01-24 ENCOUNTER — HOSPITAL ENCOUNTER (OUTPATIENT)
Dept: RADIOLOGY | Facility: HOSPITAL | Age: 43
Discharge: HOME | End: 2023-01-24
Attending: INTERNAL MEDICINE
Payer: COMMERCIAL

## 2023-01-24 DIAGNOSIS — E83.19 IRON OVERLOAD: ICD-10-CM

## 2023-01-24 DIAGNOSIS — K76.9 LIVER LESION: ICD-10-CM

## 2023-01-24 PROCEDURE — 76391 MR ELASTOGRAPHY: ICD-10-PCS | Mod: 26,,, | Performed by: RADIOLOGY

## 2023-01-24 PROCEDURE — 74183 MRI ABDOMEN W WO CONTRAST: ICD-10-PCS | Mod: 26,,, | Performed by: RADIOLOGY

## 2023-01-24 PROCEDURE — A9585 GADOBUTROL INJECTION: HCPCS | Performed by: INTERNAL MEDICINE

## 2023-01-24 PROCEDURE — 74183 MRI ABD W/O CNTR FLWD CNTR: CPT | Mod: 26,,, | Performed by: RADIOLOGY

## 2023-01-24 PROCEDURE — 25500020 PHARM REV CODE 255: Performed by: INTERNAL MEDICINE

## 2023-01-24 PROCEDURE — 74183 MRI ABD W/O CNTR FLWD CNTR: CPT | Mod: TC

## 2023-01-24 PROCEDURE — 76391 MR ELASTOGRAPHY: CPT | Mod: TC

## 2023-01-24 PROCEDURE — 76391 MR ELASTOGRAPHY: CPT | Mod: 26,,, | Performed by: RADIOLOGY

## 2023-01-24 RX ORDER — GADOBUTROL 604.72 MG/ML
10 INJECTION INTRAVENOUS
Status: COMPLETED | OUTPATIENT
Start: 2023-01-24 | End: 2023-01-24

## 2023-01-24 RX ADMIN — GADOBUTROL 10 ML: 604.72 INJECTION INTRAVENOUS at 07:01

## 2023-02-03 ENCOUNTER — PATIENT MESSAGE (OUTPATIENT)
Dept: HEPATOLOGY | Facility: CLINIC | Age: 43
End: 2023-02-03
Payer: COMMERCIAL

## 2023-02-03 ENCOUNTER — PATIENT MESSAGE (OUTPATIENT)
Dept: HEMATOLOGY/ONCOLOGY | Facility: CLINIC | Age: 43
End: 2023-02-03
Payer: COMMERCIAL

## 2023-03-02 ENCOUNTER — OFFICE VISIT (OUTPATIENT)
Dept: HEPATOLOGY | Facility: CLINIC | Age: 43
End: 2023-03-02
Payer: COMMERCIAL

## 2023-03-02 VITALS
WEIGHT: 138.88 LBS | SYSTOLIC BLOOD PRESSURE: 101 MMHG | BODY MASS INDEX: 23.14 KG/M2 | DIASTOLIC BLOOD PRESSURE: 65 MMHG | HEART RATE: 79 BPM | HEIGHT: 65 IN | OXYGEN SATURATION: 97 % | TEMPERATURE: 99 F

## 2023-03-02 DIAGNOSIS — E83.110 HEREDITARY HEMOCHROMATOSIS: Primary | ICD-10-CM

## 2023-03-02 DIAGNOSIS — K76.9 LIVER LESION: ICD-10-CM

## 2023-03-02 PROCEDURE — 1159F MED LIST DOCD IN RCRD: CPT | Mod: CPTII,S$GLB,, | Performed by: INTERNAL MEDICINE

## 2023-03-02 PROCEDURE — 3078F DIAST BP <80 MM HG: CPT | Mod: CPTII,S$GLB,, | Performed by: INTERNAL MEDICINE

## 2023-03-02 PROCEDURE — 99999 PR PBB SHADOW E&M-EST. PATIENT-LVL IV: ICD-10-PCS | Mod: PBBFAC,,, | Performed by: INTERNAL MEDICINE

## 2023-03-02 PROCEDURE — 3008F BODY MASS INDEX DOCD: CPT | Mod: CPTII,S$GLB,, | Performed by: INTERNAL MEDICINE

## 2023-03-02 PROCEDURE — 99999 PR PBB SHADOW E&M-EST. PATIENT-LVL IV: CPT | Mod: PBBFAC,,, | Performed by: INTERNAL MEDICINE

## 2023-03-02 PROCEDURE — 1160F PR REVIEW ALL MEDS BY PRESCRIBER/CLIN PHARMACIST DOCUMENTED: ICD-10-PCS | Mod: CPTII,S$GLB,, | Performed by: INTERNAL MEDICINE

## 2023-03-02 PROCEDURE — 3008F PR BODY MASS INDEX (BMI) DOCUMENTED: ICD-10-PCS | Mod: CPTII,S$GLB,, | Performed by: INTERNAL MEDICINE

## 2023-03-02 PROCEDURE — 3074F PR MOST RECENT SYSTOLIC BLOOD PRESSURE < 130 MM HG: ICD-10-PCS | Mod: CPTII,S$GLB,, | Performed by: INTERNAL MEDICINE

## 2023-03-02 PROCEDURE — 99214 OFFICE O/P EST MOD 30 MIN: CPT | Mod: S$GLB,,, | Performed by: INTERNAL MEDICINE

## 2023-03-02 PROCEDURE — 99214 PR OFFICE/OUTPT VISIT, EST, LEVL IV, 30-39 MIN: ICD-10-PCS | Mod: S$GLB,,, | Performed by: INTERNAL MEDICINE

## 2023-03-02 PROCEDURE — 3074F SYST BP LT 130 MM HG: CPT | Mod: CPTII,S$GLB,, | Performed by: INTERNAL MEDICINE

## 2023-03-02 PROCEDURE — 1160F RVW MEDS BY RX/DR IN RCRD: CPT | Mod: CPTII,S$GLB,, | Performed by: INTERNAL MEDICINE

## 2023-03-02 PROCEDURE — 1159F PR MEDICATION LIST DOCUMENTED IN MEDICAL RECORD: ICD-10-PCS | Mod: CPTII,S$GLB,, | Performed by: INTERNAL MEDICINE

## 2023-03-02 PROCEDURE — 3078F PR MOST RECENT DIASTOLIC BLOOD PRESSURE < 80 MM HG: ICD-10-PCS | Mod: CPTII,S$GLB,, | Performed by: INTERNAL MEDICINE

## 2023-03-02 NOTE — PATIENT INSTRUCTIONS
Continue phlebotomy- may need to increase frequency- target ferritin <50  Abdo US in one year  Return 1 year, MRE

## 2023-03-02 NOTE — PROGRESS NOTES
"HEPATOLOGY FOLLOW UP    Referring Physician: Self-referral  Current Corresponding Physician: Jayde Mata MD    Danielle Nelson is here for follow up of No chief complaint on file.      HPI  I first saw this patient in consultation in 10/22/19 when she was 33 weeks pregnant. At the time she was a 39 y.o. female who was found out to have an elevated ferritin in the 300-400s range. 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 She was treated with phlebotomy and initially was phlebotomized once weekly. She last underwent phlebotomy in March 2019. It sounds like she only underwent phlebotomy 6 times before she became iron deficient.  Other data:  --2012 ALT and AST 22/22  --MRI abdomen 2012: Findings suggest a mild degree of increased density in the liver, which can be seen with iron deposition.  --ferritin at the time 272  -- alpha1 antitrypsin level 136 in 2012  -78% iron saturation in 2013 2015 liver biopsy (may have been done after she started phlebotomy)  The perivenular sinusoidal dilatation suggests possible passive congestion. The pattern of the spotty lobular hepatitis is not specific and may represent drug/toxin-induced liver injury. CLINICAL HISTORY Hepatomegaly. Homozygous C282Y mutation, has received therapeutic phlebotomy. Heterozygous alpha-1 antitrypsin phenotype PiM2S with normal enzyme protein levels. Intermittent borderline elevations of AMA M2. SPECIMEN SOURCE Right lobe liver biopsy, ultrasound guided by Dr. Mcdonough GROSS DESCRIPTION The specimen is received in formalin, is labeled with the patient's information and "right lobeliver biopsy" and consists of three cores of light brown soft tissue, ranging in length from 1.8 to 2 cm, entirely submitted in cassette A1. CG/pl/RLS MICROSCOPIC The liver biopsy includes 35 portal areas, most of which appear normal. Focal minimal portal inflammation " consists of small lymphocytes, with rare plasma cells and eosinophils. The interlobular bile ducts demonstrate normal morphology. Focal cholangiolar proliferation is minimal. No portal fibrosis is evident (trichrome).     The lobules contain rare acidophil bodies, associated with minimal lymphohistiocytic   inflammation. The hepatocytes contain a mild amount of lipofuscin. Macrovesicular steatosis is minimal and involves less than 5% of the hepatocytes. Rare hepatocytes demonstrate minimal stainable iron (0-1+; Perls). No ballooning degeneration or alpha-1 antitrypsin accumulation (PAS with diastase) is identified.     Abdo US 12/2017: Liver: Size: 16.1 cm in the right midclavicular line, normal; Appearance: Mildly coarse echogenicity, smooth contour;  Mass: 0.7 x 0.9 x 0.9 cm hyperechoic lesion in the right hepatic lobe.   MRI w wo conrast 3/6/2015: The liver appears to be enlarged with the inferior taper reaching the inferior pole of the right kidney near the level of the right iliac bone. Signal intensity from the liver is slightly more hyperintense   on T1 out of phase when compare with the T1 in phase (series 902, versus series 901).  Focal hyperintense lesion on T2 measuring 8 mm in diameter within segment 8, demonstrate restriction diffusion, and progressively   filling in on postcontrast examination.      5/1/19 labs: ALT 21, AST 23, ALKP 65, Tbil 0.6  Ferritin 14 7/24/19 and 25 8/29/19 with iron sat of 10 % and 48% respectively.    My impression at the time:  She definitely has the genetic defect for hereditary hemochromatosis. In 2012 her liver enzymes were normal at a time when there was only a modestly elevated ferritin, but MRI did suggest some iron overload. Subsequent liver biopsy did not show significant iron. However, the biopsy may have been done after she initiated phlebotomy which would explain the lack of iron. Of note, she was not phlebotomized many times before she became anemic which would  suggest she was not very iron overloaded. She likely has hemochromatosis. I wanted to review her records from [place] including the liver biopsy, but was unable to obtain records.    Interval History  Since Danielle Nelson's last few visits (last seen 5/2/22):    Has had phlebotomies ~8 weeks (she is donating blood). Feeling well. The patient denies any symptoms of decompensated cirrhosis, including no ascites or edema, cognitive problems that would suggest hepatic encephalopathy, or GI bleeding from varices.    Labs 3/22/22: ALT 23, AST 29, ALKP 72, Tbil 0.3, hemoglobin 13.8  Labs 2/22/22: iron sat 96%, ferritin 102  MRI abdo w wo eovist/MRE 4/22/22: The liver is normal in size.  Hepatic parenchyma demonstrates slight increased signal intensity on out of phase imaging suggesting increased iron overload.  There are 3 lesions (periphery of segment 8: 1 cm, series 9, image 24; posterior aspect of junction of segments 6 and 7: 0.7 cm, series 9, image 30; segment 5: 0.9 cm, series 9, image 42) that demonstrate homogeneous T2 signal hyperintensity and peripheral discontinuous nodular enhancement on the early postcontrast phases that progressively fills in on the 5 minute delayed phase, findings that are consistent with hemangiomas.  Multiple additional foci of diffusion signal hyperintensity throughout the right lobe, some of which demonstrate corresponding T2 signal hyperintensity but are too small to accurately characterize.  No intrahepatic bile duct dilatation.  Portal vasculature is patent; could not assess fibrosis due to presence of iron  CT abdo/pelvis TP 4/7/22: The liver capsule smooth. The parenchyma is homogeneous. Scattered hypodensities are identified throughout the liver which are too small to characterize. Also several hypodense lesions are identified which likely demonstrate peripheral enhancement and become isodense on delayed imaging suggestive of hemangiomas  Abdo US 2/22/2022: The liver is  nonenlarged. There is a 7 mm hyperechoic nodule in the mid right hepatic lobe. There is a poorly defined heterogeneous lesion in the anterior right hepatic lobe measuring 3.3 x 2.9 x 2.6 cm. There is no biliary dilatation. There is a 10 mm hyperechoic nodule in the right hepatic lobe. The main portal vein is patent with hepatopedal flow. The CBD measures 2 mm.    Labs 1/23/23: ALT 18, AST 23, ALKP 70, Tbil 1.0, ferritin 84, iron sat 92%  MRE 1/24/23: no significant steatosis; F0 fibrosis; no iron overload  MRI abdo w wo gadavist 1/24/23: Liver: Normal size.  Homogeneous background signal.  Stable hemangiomas measuring 0.9 cm (11:27), 0.5 cm (15:43), 0.8 cm (11:48), 0.7 cm (15:33).  Additional T2 hyperintense/diffusion hyperintense lesions which are too small to further characterize, likely also hemangiomas or hepatic cysts (04:34).  Hepatic and portal veins are patent.    Outpatient Encounter Medications as of 3/2/2023   Medication Sig Dispense Refill    ALPRAZolam (XANAX) 0.5 MG tablet Take 0.5 mg by mouth daily as needed.      cholecalciferol, vitamin D3, 1,250 mcg (50,000 unit) capsule Take 50,000 Units by mouth every 7 days.      dextroamphetamine-amphetamine (ADDERALL) 20 mg tablet TAKE 1 TABLET BY MOUTH TWICE DAILY TO CONTROL ADD      drospirenone-ethinyl estradioL (AYDEN, 28,) 3-0.03 mg per tablet Take 1 tablet by mouth once daily. 90 tablet 3    ergocalciferol (ERGOCALCIFEROL) 50,000 unit Cap TAKE 1 CAPSULE BY MOUTH ONCE A WEEK FOR 3 MONTHS. THEN START OTC VITAMIND      ibuprofen (ADVIL,MOTRIN) 600 MG tablet Take 1 tablet (600 mg total) by mouth every 8 (eight) hours as needed for Pain. 30 tablet 1    magnesium oxide (MAG-OX) 400 mg (241.3 mg magnesium) tablet Take 1 tablet by mouth 2 (two) times daily.      oxyCODONE-acetaminophen (PERCOCET) 5-325 mg per tablet Take 1 tablet by mouth every 4 (four) hours as needed for Pain. 8 tablet 0    valACYclovir (VALTREX) 500 MG tablet PRN  0    zolpidem (AMBIEN) 10  mg Tab Take 5 mg by mouth nightly as needed.       No facility-administered encounter medications on file as of 3/2/2023.     Review of patient's allergies indicates:  No Known Allergies  Past Medical History:   Diagnosis Date    Hemochromatosis        Review of Systems   Constitutional: Negative.    HENT: Negative.     Eyes: Negative.    Respiratory: Negative.     Cardiovascular: Negative.    Gastrointestinal: Negative.    Genitourinary: Negative.    Musculoskeletal: Negative.    Skin: Negative.    Neurological: Negative.    Psychiatric/Behavioral: Negative.       Vitals:    03/02/23 1524   BP: 101/65   Pulse: 79   Temp: 98.9 °F (37.2 °C)        Physical Exam  Vitals reviewed.   Constitutional:       Appearance: She is well-developed.   HENT:      Head: Normocephalic and atraumatic.   Eyes:      General: No scleral icterus.     Conjunctiva/sclera: Conjunctivae normal.      Pupils: Pupils are equal, round, and reactive to light.   Neck:      Thyroid: No thyromegaly.   Cardiovascular:      Rate and Rhythm: Normal rate and regular rhythm.      Heart sounds: Normal heart sounds.   Pulmonary:      Effort: Pulmonary effort is normal.      Breath sounds: Normal breath sounds. No rales.   Abdominal:      General: Bowel sounds are normal. There is no distension.      Palpations: Abdomen is soft. There is no mass.      Tenderness: There is no abdominal tenderness.   Musculoskeletal:         General: Normal range of motion.      Cervical back: Normal range of motion and neck supple.   Skin:     General: Skin is warm and dry.      Findings: No rash.   Neurological:      Mental Status: She is alert and oriented to person, place, and time.       MELD-Na score: 6 at 1/23/2023  2:19 PM  MELD score: 6 at 1/23/2023  2:19 PM  Calculated from:  Serum Creatinine: 0.7 mg/dL (Using min of 1 mg/dL) at 1/23/2023  2:19 PM  Serum Sodium: 135 mmol/L at 1/23/2023  2:19 PM  Total Bilirubin: 1.0 mg/dL at 1/23/2023  2:19 PM  INR(ratio): 1.0 at  1/23/2023  2:19 PM  Age: 42 years    Lab Results   Component Value Date     01/23/2023     01/23/2023    BUN 11 01/23/2023    BUN 11 01/23/2023    CREATININE 0.7 01/23/2023    CREATININE 0.7 01/23/2023    CALCIUM 10.1 01/23/2023    CALCIUM 10.1 01/23/2023     (L) 01/23/2023     (L) 01/23/2023    K 3.8 01/23/2023    K 3.8 01/23/2023     01/23/2023     01/23/2023    PROT 7.7 01/23/2023    PROT 7.7 01/23/2023    CO2 24 01/23/2023    CO2 24 01/23/2023    ANIONGAP 11 01/23/2023    ANIONGAP 11 01/23/2023    WBC 6.77 01/23/2023    WBC 6.77 01/23/2023    RBC 4.17 01/23/2023    RBC 4.17 01/23/2023    HGB 14.3 01/23/2023    HGB 14.3 01/23/2023    HCT 42.8 01/23/2023    HCT 42.8 01/23/2023     (H) 01/23/2023     (H) 01/23/2023    MCH 34.3 (H) 01/23/2023    MCH 34.3 (H) 01/23/2023    MCHC 33.4 01/23/2023    MCHC 33.4 01/23/2023     Lab Results   Component Value Date    RDW 12.7 01/23/2023    RDW 12.7 01/23/2023     01/23/2023     01/23/2023    MPV 10.3 01/23/2023    MPV 10.3 01/23/2023    GRAN 4.6 01/23/2023    GRAN 67.2 01/23/2023    GRAN 4.6 01/23/2023    GRAN 67.2 01/23/2023    LYMPH 1.7 01/23/2023    LYMPH 25.6 01/23/2023    LYMPH 1.7 01/23/2023    LYMPH 25.6 01/23/2023    MONO 0.3 01/23/2023    MONO 5.0 01/23/2023    MONO 0.3 01/23/2023    MONO 5.0 01/23/2023    EOSINOPHIL 1.3 01/23/2023    EOSINOPHIL 1.3 01/23/2023    BASOPHIL 0.6 01/23/2023    BASOPHIL 0.6 01/23/2023    EOS 0.1 01/23/2023    EOS 0.1 01/23/2023    BASO 0.04 01/23/2023    BASO 0.04 01/23/2023    GROUPTRH O POS 08/02/2021    GROUPTRH O POS 12/15/2020    ALBUMIN 4.2 01/23/2023    ALBUMIN 4.2 01/23/2023    AST 23 01/23/2023    AST 23 01/23/2023    ALT 18 01/23/2023    ALT 18 01/23/2023    ALKPHOS 70 01/23/2023    ALKPHOS 70 01/23/2023    LABPROT 9.9 01/23/2023    INR 1.0 01/23/2023       Assessment and Plan:  Danielle Nelson is a 42 y.o. female with hemochromatosis. MRE now shows no  significant iron or fibrosis. Liver enzymes are normal. Ferritin >50 and iron sat is elevated. Recommend continued phlebotomy every ~8 weeks. Repeat MRE in one year    The liver lesions noted c/w hepatic heamngiomas -recommend abdo US in one year.

## 2023-03-03 NOTE — LACTATION NOTE
Basic lactation education reviewed, all questions answered. LC left phone number on mother's white board for mother to call for asst as needed.Told mother what time LC leaves the floor. Mother also told that LC can see when she calls [employer] phone and if LC does not answer, she is busy but will come as soon as possible.    
"Pt expressed concern related to baby's reluctance to nurse after receiving bottle and commented "he's not the same baby." LC explained the dynamics of flow rate and encouraged Pt to massage breast and hand express prior to placing baby to breast. LC reminded Pt to use breast compression while nursing and use various techniques to stimulate baby while at the breast. Pt acknowledged understanding.Lactation discharge education completed. Plan of care is for pt to follow basic breastfeeding education, frequent feeding based on baby's cues, and to monitor baby's voids and stools. Breastfeeding guide, including First Alert survey, resource list, and lactation warmline phone number reviewed. Pt to notify doctor for maternal or infant concerns, as reviewed with LC. Pt verbalizes understanding and questions answered.   "
Lactation Round: LC reviewed basic breastfeeding education. Pt in the process of consenting for donor milk. Pt agreeable to hand expression and pumping to provide additional supplementation to baby. Thick drops present on nipple after hand expressing both breast. Pt will contact LC to assist with pumping after feeding baby.  
98

## 2023-03-08 ENCOUNTER — PATIENT MESSAGE (OUTPATIENT)
Dept: VASCULAR SURGERY | Facility: CLINIC | Age: 43
End: 2023-03-08
Payer: COMMERCIAL

## 2023-03-15 ENCOUNTER — PATIENT MESSAGE (OUTPATIENT)
Dept: VASCULAR SURGERY | Facility: CLINIC | Age: 43
End: 2023-03-15
Payer: COMMERCIAL

## 2023-05-10 ENCOUNTER — PATIENT MESSAGE (OUTPATIENT)
Dept: CARDIOLOGY | Facility: CLINIC | Age: 43
End: 2023-05-10
Payer: COMMERCIAL

## 2023-05-10 DIAGNOSIS — Q28.9 CONGENITAL ANOMALY OF CARDIOVASCULAR SYSTEM: ICD-10-CM

## 2023-05-10 DIAGNOSIS — Q24.9 ADULT CONGENITAL HEART DISEASE: Primary | ICD-10-CM

## 2023-07-02 ENCOUNTER — PATIENT MESSAGE (OUTPATIENT)
Dept: CARDIOLOGY | Facility: CLINIC | Age: 43
End: 2023-07-02
Payer: COMMERCIAL

## 2023-07-05 ENCOUNTER — PATIENT MESSAGE (OUTPATIENT)
Dept: CARDIOLOGY | Facility: CLINIC | Age: 43
End: 2023-07-05
Payer: COMMERCIAL

## 2023-07-11 ENCOUNTER — PATIENT MESSAGE (OUTPATIENT)
Dept: CARDIOLOGY | Facility: CLINIC | Age: 43
End: 2023-07-11
Payer: COMMERCIAL

## 2023-07-20 ENCOUNTER — HOSPITAL ENCOUNTER (OUTPATIENT)
Dept: CARDIOLOGY | Facility: HOSPITAL | Age: 43
Discharge: HOME | End: 2023-07-20
Attending: PEDIATRICS
Payer: COMMERCIAL

## 2023-07-20 ENCOUNTER — OFFICE VISIT (OUTPATIENT)
Dept: CARDIOLOGY | Facility: CLINIC | Age: 43
End: 2023-07-20
Payer: COMMERCIAL

## 2023-07-20 ENCOUNTER — HOSPITAL ENCOUNTER (OUTPATIENT)
Dept: CARDIOLOGY | Facility: CLINIC | Age: 43
Discharge: HOME | End: 2023-07-20
Attending: PEDIATRICS
Payer: COMMERCIAL

## 2023-07-20 VITALS
HEIGHT: 65 IN | WEIGHT: 135.81 LBS | OXYGEN SATURATION: 97 % | DIASTOLIC BLOOD PRESSURE: 64 MMHG | SYSTOLIC BLOOD PRESSURE: 107 MMHG | BODY MASS INDEX: 22.63 KG/M2 | HEART RATE: 84 BPM

## 2023-07-20 VITALS — BODY MASS INDEX: 22.49 KG/M2 | HEIGHT: 65 IN | WEIGHT: 135 LBS

## 2023-07-20 DIAGNOSIS — Q28.9 CONGENITAL ANOMALY OF CARDIOVASCULAR SYSTEM: ICD-10-CM

## 2023-07-20 DIAGNOSIS — Q24.9 ADULT CONGENITAL HEART DISEASE: ICD-10-CM

## 2023-07-20 DIAGNOSIS — Q24.9 ADULT CONGENITAL HEART DISEASE: Primary | ICD-10-CM

## 2023-07-20 LAB
AV INDEX (PROSTH): 0.83
AV MEAN GRADIENT: 3 MMHG
AV PEAK GRADIENT: 6 MMHG
AV VALVE AREA: 3 CM2
AV VELOCITY RATIO: 0.81
BSA FOR ECHO PROCEDURE: 1.68 M2
CV ECHO LV RWT: 0.23 CM
DOP CALC AO PEAK VEL: 1.24 M/S
DOP CALC AO VTI: 25.31 CM
DOP CALC LVOT AREA: 3.6 CM2
DOP CALC LVOT DIAMETER: 2.15 CM
DOP CALC LVOT PEAK VEL: 1 M/S
DOP CALC LVOT STROKE VOLUME: 75.84 CM3
DOP CALC RVOT PEAK VEL: 0.91 M/S
DOP CALC RVOT VTI: 17.93 CM
DOP CALCLVOT PEAK VEL VTI: 20.9 CM
E WAVE DECELERATION TIME: 154.6 MSEC
E/A RATIO: 1.73
E/E' RATIO: 4.41 M/S
ECHO LV POSTERIOR WALL: 0.53 CM (ref 0.6–1.1)
EJECTION FRACTION: 60 %
FRACTIONAL SHORTENING: 32 % (ref 28–44)
INTERVENTRICULAR SEPTUM: 0.52 CM (ref 0.6–1.1)
LA MAJOR: 4.39 CM
LA MINOR: 4.14 CM
LA WIDTH: 3.72 CM
LEFT ATRIUM SIZE: 2.79 CM
LEFT ATRIUM VOLUME INDEX MOD: 21.1 ML/M2
LEFT ATRIUM VOLUME INDEX: 22.5 ML/M2
LEFT ATRIUM VOLUME MOD: 35.18 CM3
LEFT ATRIUM VOLUME: 37.59 CM3
LEFT INTERNAL DIMENSION IN SYSTOLE: 3.1 CM (ref 2.1–4)
LEFT VENTRICLE DIASTOLIC VOLUME INDEX: 57.92 ML/M2
LEFT VENTRICLE DIASTOLIC VOLUME: 96.73 ML
LEFT VENTRICLE MASS INDEX: 42 G/M2
LEFT VENTRICLE SYSTOLIC VOLUME INDEX: 22.8 ML/M2
LEFT VENTRICLE SYSTOLIC VOLUME: 38.02 ML
LEFT VENTRICULAR INTERNAL DIMENSION IN DIASTOLE: 4.59 CM (ref 3.5–6)
LEFT VENTRICULAR MASS: 69.41 G
LV LATERAL E/E' RATIO: 4.57 M/S
LV SEPTAL E/E' RATIO: 4.27 M/S
MV A" WAVE DURATION": 9.13 MSEC
MV PEAK A VEL: 0.37 M/S
MV PEAK E VEL: 0.64 M/S
MV STENOSIS PRESSURE HALF TIME: 44.83 MS
MV VALVE AREA P 1/2 METHOD: 4.91 CM2
PISA TR MAX VEL: 1.95 M/S
PULM VEIN S/D RATIO: 0.46
PV MEAN GRADIENT: 1.93 MMHG
PV PEAK D VEL: 0.71 M/S
PV PEAK S VEL: 0.33 M/S
PV PEAK VELOCITY: 1.2 CM/S
RA MAJOR: 4.19 CM
RA WIDTH: 3.21 CM
RIGHT VENTRICULAR END-DIASTOLIC DIMENSION: 4.26 CM
SINUS: 3.01 CM
STJ: 2.37 CM
TDI LATERAL: 0.14 M/S
TDI SEPTAL: 0.15 M/S
TDI: 0.15 M/S
TR MAX PG: 15 MMHG
TRICUSPID ANNULAR PLANE SYSTOLIC EXCURSION: 2.7 CM

## 2023-07-20 PROCEDURE — 93325 ECHO (CUPID ONLY): ICD-10-PCS | Mod: 26,,, | Performed by: PEDIATRICS

## 2023-07-20 PROCEDURE — 3008F PR BODY MASS INDEX (BMI) DOCUMENTED: ICD-10-PCS | Mod: CPTII,S$GLB,, | Performed by: PEDIATRICS

## 2023-07-20 PROCEDURE — 3008F BODY MASS INDEX DOCD: CPT | Mod: CPTII,S$GLB,, | Performed by: PEDIATRICS

## 2023-07-20 PROCEDURE — 93320 DOPPLER ECHO COMPLETE: CPT | Mod: 26,,, | Performed by: PEDIATRICS

## 2023-07-20 PROCEDURE — 3078F DIAST BP <80 MM HG: CPT | Mod: CPTII,S$GLB,, | Performed by: PEDIATRICS

## 2023-07-20 PROCEDURE — 1159F PR MEDICATION LIST DOCUMENTED IN MEDICAL RECORD: ICD-10-PCS | Mod: CPTII,S$GLB,, | Performed by: PEDIATRICS

## 2023-07-20 PROCEDURE — 99214 PR OFFICE/OUTPT VISIT, EST, LEVL IV, 30-39 MIN: ICD-10-PCS | Mod: 25,S$GLB,, | Performed by: PEDIATRICS

## 2023-07-20 PROCEDURE — 93005 EKG 12-LEAD: ICD-10-PCS | Mod: S$GLB,,, | Performed by: PEDIATRICS

## 2023-07-20 PROCEDURE — 99214 OFFICE O/P EST MOD 30 MIN: CPT | Mod: 25,S$GLB,, | Performed by: PEDIATRICS

## 2023-07-20 PROCEDURE — 93303 ECHO TRANSTHORACIC: CPT

## 2023-07-20 PROCEDURE — 3074F PR MOST RECENT SYSTOLIC BLOOD PRESSURE < 130 MM HG: ICD-10-PCS | Mod: CPTII,S$GLB,, | Performed by: PEDIATRICS

## 2023-07-20 PROCEDURE — 93320 ECHO (CUPID ONLY): ICD-10-PCS | Mod: 26,,, | Performed by: PEDIATRICS

## 2023-07-20 PROCEDURE — 99999 PR PBB SHADOW E&M-EST. PATIENT-LVL III: ICD-10-PCS | Mod: PBBFAC,,, | Performed by: PEDIATRICS

## 2023-07-20 PROCEDURE — 93325 DOPPLER ECHO COLOR FLOW MAPG: CPT | Mod: 26,,, | Performed by: PEDIATRICS

## 2023-07-20 PROCEDURE — 93303 ECHO TRANSTHORACIC: CPT | Mod: 26,,, | Performed by: PEDIATRICS

## 2023-07-20 PROCEDURE — 3078F PR MOST RECENT DIASTOLIC BLOOD PRESSURE < 80 MM HG: ICD-10-PCS | Mod: CPTII,S$GLB,, | Performed by: PEDIATRICS

## 2023-07-20 PROCEDURE — 93010 EKG 12-LEAD: ICD-10-PCS | Mod: S$GLB,,, | Performed by: INTERNAL MEDICINE

## 2023-07-20 PROCEDURE — 1159F MED LIST DOCD IN RCRD: CPT | Mod: CPTII,S$GLB,, | Performed by: PEDIATRICS

## 2023-07-20 PROCEDURE — 99999 PR PBB SHADOW E&M-EST. PATIENT-LVL III: CPT | Mod: PBBFAC,,, | Performed by: PEDIATRICS

## 2023-07-20 PROCEDURE — 93010 ELECTROCARDIOGRAM REPORT: CPT | Mod: S$GLB,,, | Performed by: INTERNAL MEDICINE

## 2023-07-20 PROCEDURE — 3074F SYST BP LT 130 MM HG: CPT | Mod: CPTII,S$GLB,, | Performed by: PEDIATRICS

## 2023-07-20 PROCEDURE — 93303 ECHO (CUPID ONLY): ICD-10-PCS | Mod: 26,,, | Performed by: PEDIATRICS

## 2023-07-20 PROCEDURE — 93005 ELECTROCARDIOGRAM TRACING: CPT | Mod: S$GLB,,, | Performed by: PEDIATRICS

## 2023-07-20 RX ORDER — ZOLPIDEM TARTRATE 5 MG/1
5 TABLET ORAL NIGHTLY PRN
COMMUNITY
Start: 2023-06-19

## 2023-07-20 RX ORDER — ONDANSETRON 4 MG/1
4 TABLET, ORALLY DISINTEGRATING ORAL EVERY 4 HOURS PRN
COMMUNITY
Start: 2023-04-20

## 2023-07-20 NOTE — PROGRESS NOTES
2023    re:Danielle Nelson  :1980    Jayde Mata MD  [address] [facility] [id number]    [facility]     Dear Adolfo:    Danielle Nelson is a 42 y.o. female seen in my [facility] clinic today for evaluation of congenital heart disease.  To summarize her diagnoses are as follow:  1.  History of perimembranous ventricular septal defect status post surgical repair at 1 years of age.  - excellent repair with no residual ventricular septal defect, no evidence of pulmonary hypertension, excellent biventricular function  - trivial aortic valve insufficiency, not noted today  2.  Child with VSD  3.  ADHD  4.  History of vasovagal/situational syncope    To summarize, my recommendations are as follows:  1.  No cardiac contraindication for stimulants or other psychotropic medications.  2.  Treat as normal from a cardiac standpoint.  There is no need for endocarditis prophylaxis or activity restriction.  3.  Follow-up in 3 years with repeat EKG and echocardiogram.  4.  Drink plenty of fluids, sit down if lightheadedness occurs.    Discussion:  She has excellent biventricular function, no residual intracardiac shunting, and no evidence of pulmonary hypertension.  There is trivial, hemodynamically insignificant, aortic insufficiency (I actually did not see aortic insufficiency today) which is common after repair of perimembranous VSD.  She should be treated as completely normal from a cardiac standpoint.  She does not require endocarditis prophylaxis.      History of present illness:  The history is provided by the patient.  She underwent surgical repair of a ventricular septal defect at 1 years of age here at [facility].  No other intervention was necessary.  She has basically been asymptomatic from a cardiac standpoint since that time.  She plays tennis without difficulty.  No chest pain, dyspnea on exertion, shortness  of breath, palpitations, edema.  Over the years, she has had a few episodes of syncope - one after taking trazodone, she got up to go to the bathroom at night and had brief syncope, once she stood up quickly after an embarrassing situation and passed out.  All associated with lightheadedness, pale appearance.  None in years.    Past Medical History:   Diagnosis Date    Hemochromatosis      Past Surgical History:   Procedure Laterality Date    KNEE SURGERY Right 07/2018    VSD REPAIR      3yo (1982)     Family History   Problem Relation Age of Onset    Breast cancer Neg Hx     Colon cancer Neg Hx     Ovarian cancer Neg Hx     Arrhythmia Neg Hx     Cardiomyopathy Neg Hx     Congenital heart disease Neg Hx     Early death Neg Hx     Heart attacks under age 50 Neg Hx     Hypertension Neg Hx     Long QT syndrome Neg Hx     Pacemaker/defibrilator Neg Hx      Social History     Socioeconomic History    Marital status:    Tobacco Use    Smoking status: Never    Smokeless tobacco: Never   Substance and Sexual Activity    Alcohol use: Not Currently    Drug use: Never    Sexual activity: Yes     Partners: Male     Birth control/protection: None     Current Outpatient Medications on File Prior to Visit   Medication Sig Dispense Refill    ALPRAZolam (XANAX) 0.5 MG tablet Take 0.5 mg by mouth daily as needed.      dextroamphetamine-amphetamine (ADDERALL) 20 mg tablet TAKE 1 TABLET BY MOUTH TWICE DAILY TO CONTROL ADD      semaglutide (OZEMPIC SUBQ) Inject 20 Units into the skin every 7 days.      valACYclovir (VALTREX) 500 MG tablet PRN  0    zolpidem (AMBIEN) 5 MG Tab Take 5 mg by mouth nightly as needed.      ondansetron (ZOFRAN-ODT) 4 MG TbDL Take 4 mg by mouth every 4 (four) hours as needed.      [DISCONTINUED] cholecalciferol, vitamin D3, 1,250 mcg (50,000 unit) capsule Take 50,000 Units by mouth every 7 days.      [DISCONTINUED] drospirenone-ethinyl estradioL (AYDEN, 28,) 3-0.03 mg per tablet Take 1 tablet by mouth  "once daily. 90 tablet 3    [DISCONTINUED] ergocalciferol (ERGOCALCIFEROL) 50,000 unit Cap TAKE 1 CAPSULE BY MOUTH ONCE A WEEK FOR 3 MONTHS. THEN START OTC VITAMIND      [DISCONTINUED] ibuprofen (ADVIL,MOTRIN) 600 MG tablet Take 1 tablet (600 mg total) by mouth every 8 (eight) hours as needed for Pain. 30 tablet 1    [DISCONTINUED] magnesium oxide (MAG-OX) 400 mg (241.3 mg magnesium) tablet Take 1 tablet by mouth 2 (two) times daily.      [DISCONTINUED] oxyCODONE-acetaminophen (PERCOCET) 5-325 mg per tablet Take 1 tablet by mouth every 4 (four) hours as needed for Pain. 8 tablet 0    [DISCONTINUED] zolpidem (AMBIEN) 10 mg Tab Take 5 mg by mouth nightly as needed.       No current facility-administered medications on file prior to visit.     Review of patient's allergies indicates:  No Known Allergies     The review of systems is as noted above. It is otherwise negative for other symptoms related to the general, neurological, psychiatric, endocrine, gastrointestinal, genitourinary, respiratory, dermatologic, musculoskeletal, hematologic, and immunologic systems.    Vitals:    07/20/23 0900   BP: 107/64   BP Location: Right arm   Pulse: 84   SpO2: 97%   Weight: 61.6 kg (135 lb 12.9 oz)   Height: 5' 5" (1.651 m)     In general, she is a very healthy-appearing nondysmorphic female in no apparent distress.  The eyes, nares, and oropharynx are clear.  Eyelids and conjunctiva are normal without drainage or erythema.  Pupils equal and round bilaterally.  The head is normocephalic and atraumatic.  The neck is supple without jugular venous distention or thyroid enlargement.  The lungs are clear to auscultation bilaterally.  There is a well-healed sternotomy incision.  The first and second heart sounds are normal.  There are no murmurs, gallops, rubs, or clicks in the seated position.  She has a gravid uterus..  Pulses are normal in all 4 extremities with brisk capillary refill and no clubbing, cyanosis, or edema.  She is " wearing compression stockings over the right leg.  No edema noted.  No rashes are noted.    I personally reviewed the following tests performed today and my interpretation follows:  EKG reveals normal sinus rhythm with a right bundle branch block.  QRS duration is about 130 milliseconds.    The official echo report is pending.  I reviewed that study in detail.  Excellent biventricular function noted.  I didn't see definitive aortic insufficiency.  Large perimembranous ventricular septal defect replaced with patch with no residual leak.  No evidence of pulmonary hypertension.    Thank you for referring this patient to our clinic.  Please call with any questions.    Sincerely,        Philip May MD  Pediatric Cardiology  Adult Congenital Heart Disease  Pediatric Heart Failure and Transplantation  [facility] [address] [place]  [address]  [phone]

## 2023-08-15 ENCOUNTER — PATIENT MESSAGE (OUTPATIENT)
Dept: HEMATOLOGY/ONCOLOGY | Facility: CLINIC | Age: 43
End: 2023-08-15
Payer: COMMERCIAL

## 2023-08-17 ENCOUNTER — LAB VISIT (OUTPATIENT)
Dept: LAB | Facility: HOSPITAL | Age: 43
End: 2023-08-17
Payer: COMMERCIAL

## 2023-08-17 ENCOUNTER — OFFICE VISIT (OUTPATIENT)
Dept: HEMATOLOGY/ONCOLOGY | Facility: CLINIC | Age: 43
End: 2023-08-17
Payer: COMMERCIAL

## 2023-08-17 VITALS
BODY MASS INDEX: 22.37 KG/M2 | SYSTOLIC BLOOD PRESSURE: 106 MMHG | WEIGHT: 134.25 LBS | DIASTOLIC BLOOD PRESSURE: 60 MMHG | OXYGEN SATURATION: 99 % | HEART RATE: 82 BPM | TEMPERATURE: 98 F | RESPIRATION RATE: 18 BRPM | HEIGHT: 65 IN

## 2023-08-17 DIAGNOSIS — D75.1 POLYCYTHEMIA: Primary | ICD-10-CM

## 2023-08-17 DIAGNOSIS — E83.110 HEREDITARY HEMOCHROMATOSIS: ICD-10-CM

## 2023-08-17 DIAGNOSIS — K76.9 LIVER LESION: ICD-10-CM

## 2023-08-17 LAB
ALBUMIN SERPL BCP-MCNC: 3.9 G/DL (ref 3.5–5.2)
ALP SERPL-CCNC: 49 U/L (ref 55–135)
ALT SERPL W/O P-5'-P-CCNC: 15 U/L (ref 10–44)
ANION GAP SERPL CALC-SCNC: 7 MMOL/L (ref 8–16)
AST SERPL-CCNC: 19 U/L (ref 10–40)
BASOPHILS # BLD AUTO: 0.03 K/UL (ref 0–0.2)
BASOPHILS NFR BLD: 0.7 % (ref 0–1.9)
BILIRUB SERPL-MCNC: 0.7 MG/DL (ref 0.1–1)
BUN SERPL-MCNC: 10 MG/DL (ref 6–20)
CALCIUM SERPL-MCNC: 8.8 MG/DL (ref 8.7–10.5)
CHLORIDE SERPL-SCNC: 105 MMOL/L (ref 95–110)
CO2 SERPL-SCNC: 26 MMOL/L (ref 23–29)
CREAT SERPL-MCNC: 0.7 MG/DL (ref 0.5–1.4)
DIFFERENTIAL METHOD: ABNORMAL
EOSINOPHIL # BLD AUTO: 0.1 K/UL (ref 0–0.5)
EOSINOPHIL NFR BLD: 2.7 % (ref 0–8)
ERYTHROCYTE [DISTWIDTH] IN BLOOD BY AUTOMATED COUNT: 12.6 % (ref 11.5–14.5)
EST. GFR  (NO RACE VARIABLE): >60 ML/MIN/1.73 M^2
FERRITIN SERPL-MCNC: 32 NG/ML (ref 20–300)
GLUCOSE SERPL-MCNC: 71 MG/DL (ref 70–110)
HCT VFR BLD AUTO: 39.5 % (ref 37–48.5)
HGB BLD-MCNC: 13.5 G/DL (ref 12–16)
IMM GRANULOCYTES # BLD AUTO: 0.01 K/UL (ref 0–0.04)
IMM GRANULOCYTES NFR BLD AUTO: 0.2 % (ref 0–0.5)
IRON SERPL-MCNC: 189 UG/DL (ref 30–160)
LYMPHOCYTES # BLD AUTO: 2 K/UL (ref 1–4.8)
LYMPHOCYTES NFR BLD: 45.2 % (ref 18–48)
MCH RBC QN AUTO: 33.8 PG (ref 27–31)
MCHC RBC AUTO-ENTMCNC: 34.2 G/DL (ref 32–36)
MCV RBC AUTO: 99 FL (ref 82–98)
MONOCYTES # BLD AUTO: 0.4 K/UL (ref 0.3–1)
MONOCYTES NFR BLD: 8.1 % (ref 4–15)
NEUTROPHILS # BLD AUTO: 1.9 K/UL (ref 1.8–7.7)
NEUTROPHILS NFR BLD: 43.1 % (ref 38–73)
NRBC BLD-RTO: 0 /100 WBC
PLATELET # BLD AUTO: 247 K/UL (ref 150–450)
PMV BLD AUTO: 10.1 FL (ref 9.2–12.9)
POTASSIUM SERPL-SCNC: 3.9 MMOL/L (ref 3.5–5.1)
PROT SERPL-MCNC: 6.6 G/DL (ref 6–8.4)
RBC # BLD AUTO: 4 M/UL (ref 4–5.4)
SATURATED IRON: 65 % (ref 20–50)
SODIUM SERPL-SCNC: 138 MMOL/L (ref 136–145)
TOTAL IRON BINDING CAPACITY: 293 UG/DL (ref 250–450)
TRANSFERRIN SERPL-MCNC: 198 MG/DL (ref 200–375)
WBC # BLD AUTO: 4.45 K/UL (ref 3.9–12.7)

## 2023-08-17 PROCEDURE — 3074F PR MOST RECENT SYSTOLIC BLOOD PRESSURE < 130 MM HG: ICD-10-PCS | Mod: CPTII,S$GLB,, | Performed by: INTERNAL MEDICINE

## 2023-08-17 PROCEDURE — 3074F SYST BP LT 130 MM HG: CPT | Mod: CPTII,S$GLB,, | Performed by: INTERNAL MEDICINE

## 2023-08-17 PROCEDURE — 3008F PR BODY MASS INDEX (BMI) DOCUMENTED: ICD-10-PCS | Mod: CPTII,S$GLB,, | Performed by: INTERNAL MEDICINE

## 2023-08-17 PROCEDURE — 84466 ASSAY OF TRANSFERRIN: CPT | Performed by: INTERNAL MEDICINE

## 2023-08-17 PROCEDURE — 80053 COMPREHEN METABOLIC PANEL: CPT | Performed by: INTERNAL MEDICINE

## 2023-08-17 PROCEDURE — 1159F PR MEDICATION LIST DOCUMENTED IN MEDICAL RECORD: ICD-10-PCS | Mod: CPTII,S$GLB,, | Performed by: INTERNAL MEDICINE

## 2023-08-17 PROCEDURE — 99999 PR PBB SHADOW E&M-EST. PATIENT-LVL III: ICD-10-PCS | Mod: PBBFAC,,, | Performed by: INTERNAL MEDICINE

## 2023-08-17 PROCEDURE — 82728 ASSAY OF FERRITIN: CPT | Performed by: INTERNAL MEDICINE

## 2023-08-17 PROCEDURE — 99999 PR PBB SHADOW E&M-EST. PATIENT-LVL III: CPT | Mod: PBBFAC,,, | Performed by: INTERNAL MEDICINE

## 2023-08-17 PROCEDURE — 1159F MED LIST DOCD IN RCRD: CPT | Mod: CPTII,S$GLB,, | Performed by: INTERNAL MEDICINE

## 2023-08-17 PROCEDURE — 85025 COMPLETE CBC W/AUTO DIFF WBC: CPT | Performed by: INTERNAL MEDICINE

## 2023-08-17 PROCEDURE — 99215 PR OFFICE/OUTPT VISIT, EST, LEVL V, 40-54 MIN: ICD-10-PCS | Mod: S$GLB,,, | Performed by: INTERNAL MEDICINE

## 2023-08-17 PROCEDURE — 3078F DIAST BP <80 MM HG: CPT | Mod: CPTII,S$GLB,, | Performed by: INTERNAL MEDICINE

## 2023-08-17 PROCEDURE — 99215 OFFICE O/P EST HI 40 MIN: CPT | Mod: S$GLB,,, | Performed by: INTERNAL MEDICINE

## 2023-08-17 PROCEDURE — 36415 COLL VENOUS BLD VENIPUNCTURE: CPT | Performed by: INTERNAL MEDICINE

## 2023-08-17 PROCEDURE — 3078F PR MOST RECENT DIASTOLIC BLOOD PRESSURE < 80 MM HG: ICD-10-PCS | Mod: CPTII,S$GLB,, | Performed by: INTERNAL MEDICINE

## 2023-08-17 PROCEDURE — 3008F BODY MASS INDEX DOCD: CPT | Mod: CPTII,S$GLB,, | Performed by: INTERNAL MEDICINE

## 2023-08-17 RX ORDER — OMEPRAZOLE 40 MG/1
40 CAPSULE, DELAYED RELEASE ORAL
COMMUNITY
Start: 2023-08-07

## 2023-08-17 RX ORDER — DEXTROAMPHETAMINE SACCHARATE, AMPHETAMINE ASPARTATE MONOHYDRATE, DEXTROAMPHETAMINE SULFATE AND AMPHETAMINE SULFATE 5; 5; 5; 5 MG/1; MG/1; MG/1; MG/1
CAPSULE, EXTENDED RELEASE ORAL EVERY MORNING
COMMUNITY
Start: 2023-07-21

## 2023-08-17 RX ORDER — DEXTROAMPHETAMINE SACCHARATE, AMPHETAMINE ASPARTATE, DEXTROAMPHETAMINE SULFATE AND AMPHETAMINE SULFATE 2.5; 2.5; 2.5; 2.5 MG/1; MG/1; MG/1; MG/1
1 TABLET ORAL
COMMUNITY
Start: 2023-07-21

## 2023-08-17 NOTE — PROGRESS NOTES
Hematology and Medical Oncology   Follow Up Note     08/17/2023      Primary Hematologic Diagnosis: Hemochromatosis      History of Present Ilness:   Danielle Nelson (Danielle) is a pleasant 42 y.o.female to discuss her hemochromatosis and need for labs.        2012 or 2013 labs at [facility] to diagnose hemochromatosis  Did phlebotomy to get ferritin from 300 --> less than 50     Last one is February 2019     VSD of heart      Iron: 234  Ferritin: 102     Interval History:  Did 2 phlebotomies over the summer and 1 earlier this week [record was sent via my chart]. She donated without difficulty. Happy to see the results of her recent lab work with Ferritin remains grossly stable. Remains asymptomatic.     Past Medical History:   Past Medical History:   Diagnosis Date    Hemochromatosis        Current Medications:   Current Outpatient Medications   Medication Sig    ALPRAZolam (XANAX) 0.5 MG tablet Take 0.5 mg by mouth daily as needed.    dextroamphetamine-amphetamine (ADDERALL XR) 20 MG 24 hr capsule Take by mouth every morning.    dextroamphetamine-amphetamine 10 mg Tab Take 1 tablet by mouth.    omeprazole (PRILOSEC) 40 MG capsule Take 40 mg by mouth.    ondansetron (ZOFRAN-ODT) 4 MG TbDL Take 4 mg by mouth every 4 (four) hours as needed.    semaglutide (OZEMPIC SUBQ) Inject 20 Units into the skin every 7 days.    valACYclovir (VALTREX) 500 MG tablet PRN    zolpidem (AMBIEN) 5 MG Tab Take 5 mg by mouth nightly as needed.     No current facility-administered medications for this visit.     ALLERGIES:   Review of patient's allergies indicates:  No Known Allergies    Review of Systems:     Review of Systems   Constitutional:  Negative for appetite change, chills, diaphoresis, fatigue, fever and unexpected weight change.   HENT:   Negative for hearing loss, mouth sores, nosebleeds, sore throat, trouble swallowing and voice change.    Eyes:  Negative for eye problems and icterus.   Respiratory:  Negative for chest  tightness, cough, hemoptysis, shortness of breath and wheezing.    Cardiovascular:  Negative for chest pain, leg swelling and palpitations.   Gastrointestinal:  Negative for abdominal distention, abdominal pain, blood in stool, diarrhea, nausea and vomiting.   Endocrine: Negative for hot flashes.   Genitourinary:  Negative for bladder incontinence, difficulty urinating, dysuria and hematuria.    Musculoskeletal:  Negative for arthralgias, back pain, flank pain, gait problem, myalgias, neck pain and neck stiffness.   Skin:  Negative for itching, rash and wound.   Neurological:  Negative for dizziness, extremity weakness, gait problem, headaches, numbness, seizures and speech difficulty.   Hematological:  Negative for adenopathy. Does not bruise/bleed easily.   Psychiatric/Behavioral:  Negative for confusion, depression and sleep disturbance. The patient is not nervous/anxious.           Physical Exam:     Vitals:    08/17/23 0843   BP: 106/60   Pulse: 82   Resp: 18   Temp: 98 °F (36.7 °C)     Physical Exam  Constitutional:       General: She is not in acute distress.     Appearance: She is well-developed. She is not diaphoretic.   HENT:      Head: Normocephalic and atraumatic.      Mouth/Throat:      Pharynx: No oropharyngeal exudate.   Eyes:      Conjunctiva/sclera: Conjunctivae normal.      Pupils: Pupils are equal, round, and reactive to light.   Neck:      Thyroid: No thyromegaly.      Vascular: No JVD.      Trachea: No tracheal deviation.   Cardiovascular:      Rate and Rhythm: Normal rate and regular rhythm.      Heart sounds: Normal heart sounds. No murmur heard.     No friction rub.   Pulmonary:      Effort: Pulmonary effort is normal. No respiratory distress.      Breath sounds: Normal breath sounds. No stridor. No wheezing or rales.   Chest:      Chest wall: No tenderness.   Abdominal:      General: Bowel sounds are normal. There is no distension.      Palpations: Abdomen is soft.      Tenderness: There is  no abdominal tenderness. There is no guarding or rebound.   Musculoskeletal:         General: No tenderness or deformity. Normal range of motion.      Cervical back: Normal range of motion and neck supple.   Skin:     General: Skin is warm and dry.      Capillary Refill: Capillary refill takes less than 2 seconds.      Coloration: Skin is not pale.      Findings: No erythema or rash.   Neurological:      Mental Status: She is alert and oriented to person, place, and time.      Cranial Nerves: No cranial nerve deficit.      Sensory: No sensory deficit.      Motor: No abnormal muscle tone.      Coordination: Coordination normal.      Deep Tendon Reflexes: Reflexes normal.   Psychiatric:         Behavior: Behavior normal.         Thought Content: Thought content normal.         Judgment: Judgment normal.       ECOG Performance Status: (foot note - ECOG PS provided by Eastern Cooperative Oncology Group) 0 - Asymptomatic    Karnofsky Performance Score:  100%- Normal, No Complaints, No Evidence of Disease    Labs:   Lab Results   Component Value Date    WBC 4.45 08/17/2023    HGB 13.5 08/17/2023    HCT 39.5 08/17/2023     08/17/2023    ALT 15 08/17/2023    AST 19 08/17/2023     08/17/2023    K 3.9 08/17/2023     08/17/2023    CREATININE 0.7 08/17/2023    BUN 10 08/17/2023    CO2 26 08/17/2023    INR 1.0 01/23/2023     Iron: 189  TIBC: 293  Ferritin: 32        Imaging: Previous imaging has been reviewed     Assessment and Plan:   Mrs. Nelson is a pleasant 42 year old with Hemochromatosis        Hereditary hemochromatosis  --plan for PRN phlebotomies. She will continue to send documentation of the procedures performed  --recheck Ferritin q 3months  --MRI reveals minimum iron deposition        25 minutes were spent face to face with the patient to discuss the disease, natural history, treatment options and survival statistics. I have provided the patient with an opportunity to ask questions and have all  questions answered to her satisfaction.         she will return to clinic in 3 months, but knows to call in the interim if symptoms change or should a problem arise.      Ann Urias MD  Hematology and Medical Oncology  Bone Marrow Transplant  [facility]      BMT Chart Routing      Follow up with physician 3 months. 1. see me in 3 months with cbc,cmp, iron, ferritin [okay to overbook me if needed]   Follow up with KAREN    Provider visit type    Infusion scheduling note    Injection scheduling note    Labs    Imaging    Pharmacy appointment    Other referrals

## 2023-08-18 ENCOUNTER — PATIENT MESSAGE (OUTPATIENT)
Dept: HEMATOLOGY/ONCOLOGY | Facility: CLINIC | Age: 43
End: 2023-08-18
Payer: COMMERCIAL

## 2024-03-21 ENCOUNTER — PATIENT MESSAGE (OUTPATIENT)
Dept: PEDIATRIC CARDIOLOGY | Facility: CLINIC | Age: 44
End: 2024-03-21
Payer: COMMERCIAL

## 2024-06-24 ENCOUNTER — PATIENT MESSAGE (OUTPATIENT)
Dept: HEPATOLOGY | Facility: CLINIC | Age: 44
End: 2024-06-24
Payer: COMMERCIAL

## 2024-06-25 ENCOUNTER — TELEPHONE (OUTPATIENT)
Dept: HEPATOLOGY | Facility: CLINIC | Age: 44
End: 2024-06-25
Payer: COMMERCIAL

## 2024-06-25 DIAGNOSIS — E83.119 HEMOCHROMATOSIS, UNSPECIFIED HEMOCHROMATOSIS TYPE: Primary | ICD-10-CM

## 2024-06-25 NOTE — TELEPHONE ENCOUNTER
Spoke with pt and scheduled lab and u/s appt. MRI is not available until after the pt's scheduled f/u appt. MED the pt is scheduled on 7/30/24

## 2024-06-26 ENCOUNTER — HOSPITAL ENCOUNTER (OUTPATIENT)
Dept: RADIOLOGY | Facility: OTHER | Age: 44
Discharge: HOME | End: 2024-06-26
Attending: INTERNAL MEDICINE
Payer: COMMERCIAL

## 2024-06-26 DIAGNOSIS — E83.119 HEMOCHROMATOSIS, UNSPECIFIED HEMOCHROMATOSIS TYPE: ICD-10-CM

## 2024-06-26 PROCEDURE — 76700 US EXAM ABDOM COMPLETE: CPT | Mod: TC

## 2024-06-26 PROCEDURE — 76700 US EXAM ABDOM COMPLETE: CPT | Mod: 26,,, | Performed by: RADIOLOGY

## 2024-07-10 ENCOUNTER — OFFICE VISIT (OUTPATIENT)
Dept: HEPATOLOGY | Facility: CLINIC | Age: 44
End: 2024-07-10
Payer: COMMERCIAL

## 2024-07-10 VITALS
RESPIRATION RATE: 18 BRPM | OXYGEN SATURATION: 99 % | DIASTOLIC BLOOD PRESSURE: 62 MMHG | WEIGHT: 131.63 LBS | HEIGHT: 65 IN | SYSTOLIC BLOOD PRESSURE: 104 MMHG | BODY MASS INDEX: 21.93 KG/M2 | HEART RATE: 91 BPM

## 2024-07-10 DIAGNOSIS — E83.110 HEREDITARY HEMOCHROMATOSIS: Primary | ICD-10-CM

## 2024-07-10 PROCEDURE — 3074F SYST BP LT 130 MM HG: CPT | Mod: CPTII,S$GLB,, | Performed by: INTERNAL MEDICINE

## 2024-07-10 PROCEDURE — 99214 OFFICE O/P EST MOD 30 MIN: CPT | Mod: S$GLB,,, | Performed by: INTERNAL MEDICINE

## 2024-07-10 PROCEDURE — 3008F BODY MASS INDEX DOCD: CPT | Mod: CPTII,S$GLB,, | Performed by: INTERNAL MEDICINE

## 2024-07-10 PROCEDURE — 1159F MED LIST DOCD IN RCRD: CPT | Mod: CPTII,S$GLB,, | Performed by: INTERNAL MEDICINE

## 2024-07-10 PROCEDURE — 3078F DIAST BP <80 MM HG: CPT | Mod: CPTII,S$GLB,, | Performed by: INTERNAL MEDICINE

## 2024-07-10 PROCEDURE — 99999 PR PBB SHADOW E&M-EST. PATIENT-LVL V: CPT | Mod: PBBFAC,,, | Performed by: INTERNAL MEDICINE

## 2024-07-10 PROCEDURE — 1160F RVW MEDS BY RX/DR IN RCRD: CPT | Mod: CPTII,S$GLB,, | Performed by: INTERNAL MEDICINE

## 2024-07-10 RX ORDER — DOXEPIN HYDROCHLORIDE 10 MG/1
10 CAPSULE ORAL
COMMUNITY
Start: 2024-07-08

## 2024-07-10 RX ORDER — TRIAMCINOLONE ACETONIDE 1 MG/G
CREAM TOPICAL
COMMUNITY
Start: 2024-07-08

## 2024-07-10 NOTE — PATIENT INSTRUCTIONS
Liver tests are normal  Iron studies are in range for iron overload  Labs every 3 months  MRE in one year (01/25)  Cancel current MRE  Return 1 year

## 2024-07-10 NOTE — PROGRESS NOTES
"HEPATOLOGY FOLLOW UP    Referring Physician: Self-referral  Current Corresponding Physician: Jayde Mata MD    Danielle Nelson is here for follow up of Hemochromatosis      HPI  I first saw this patient in consultation in 10/22/19 when she was 33 weeks pregnant. At the time she was a 39 y.o. female who was found out to have an elevated ferritin in the 300-400s range. 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 She was treated with phlebotomy and initially was phlebotomized once weekly. She last underwent phlebotomy before I saw her in consultation in March 2019. It sounds like she only underwent phlebotomy 6 times before she became iron deficient.  Other data:  --2012 ALT and AST 22/22  --MRI abdomen 2012: Findings suggest a mild degree of increased density in the liver, which can be seen with iron deposition.  --ferritin at the time 272  -- alpha1 antitrypsin level 136 in 2012  -78% iron saturation in 2013 2015 liver biopsy (may have been done after she started phlebotomy)  The perivenular sinusoidal dilatation suggests possible passive congestion. The pattern of the spotty lobular hepatitis is not specific and may represent drug/toxin-induced liver injury. CLINICAL HISTORY Hepatomegaly. Homozygous C282Y mutation, has received therapeutic phlebotomy. Heterozygous alpha-1 antitrypsin phenotype PiM2S with normal enzyme protein levels. Intermittent borderline elevations of AMA M2. SPECIMEN SOURCE Right lobe liver biopsy, ultrasound guided by Dr. Mcdonough GROSS DESCRIPTION The specimen is received in formalin, is labeled with the patient's information and "right lobeliver biopsy" and consists of three cores of light brown soft tissue, ranging in length from 1.8 to 2 cm, entirely submitted in cassette A1. CG/pl/RLS MICROSCOPIC The liver biopsy includes 35 portal areas, most of which appear normal. Focal " minimal portal inflammation consists of small lymphocytes, with rare plasma cells and eosinophils. The interlobular bile ducts demonstrate normal morphology. Focal cholangiolar proliferation is minimal. No portal fibrosis is evident (trichrome).     The lobules contain rare acidophil bodies, associated with minimal lymphohistiocytic   inflammation. The hepatocytes contain a mild amount of lipofuscin. Macrovesicular steatosis is minimal and involves less than 5% of the hepatocytes. Rare hepatocytes demonstrate minimal stainable iron (0-1+; Perls). No ballooning degeneration or alpha-1 antitrypsin accumulation (PAS with diastase) is identified.     Abdo US 12/2017: Liver: Size: 16.1 cm in the right midclavicular line, normal; Appearance: Mildly coarse echogenicity, smooth contour;  Mass: 0.7 x 0.9 x 0.9 cm hyperechoic lesion in the right hepatic lobe.   MRI w wo conrast 3/6/2015: The liver appears to be enlarged with the inferior taper reaching the inferior pole of the right kidney near the level of the right iliac bone. Signal intensity from the liver is slightly more hyperintense   on T1 out of phase when compare with the T1 in phase (series 902, versus series 901).  Focal hyperintense lesion on T2 measuring 8 mm in diameter within segment 8, demonstrate restriction diffusion, and progressively   filling in on postcontrast examination.      5/1/19 labs: ALT 21, AST 23, ALKP 65, Tbil 0.6  Ferritin 14 7/24/19 and 25 8/29/19 with iron sat of 10 % and 48% respectively.    My impression at the time:  She definitely has the genetic defect for hereditary hemochromatosis. In 2012 her liver enzymes were normal at a time when there was only a modestly elevated ferritin, but MRI did suggest some iron overload. Subsequent liver biopsy did not show significant iron. However, the biopsy may have been done after she initiated phlebotomy which would explain the lack of iron. Of note, she was not phlebotomized many times before she  became anemic which would suggest she was not very iron overloaded. She likely has hemochromatosis. I wanted to review her records from [place] including the liver biopsy, but was unable to obtain records.    Interval History  Since Danielle Nelson's last few visits (last seen 3/2/23):    Has had 2 phlebotomies in the past year. Feeling well. The patient denies any symptoms of decompensated cirrhosis, including no ascites or edema, cognitive problems that would suggest hepatic encephalopathy, or GI bleeding from varices.    Labs 7/2/24: ALT 15, AST 20, ALKP 49, Tbil 0.7,  Labs 6/26/24: hemoglobin 14.2, iron sat 37%, ferritin 41    Abdo US 6/26/24: Liver measures 15.0 cm, right kidney 11.0, left kidney 11.2, spleen 9.6 cm. There are 2 hyperechoic areas in the right lobe measuring 1.1, and 0.9 cm. Impression: Hyperechoic liver lesions are consistent with small liver hemangiomas. Please see the prior MRI report dated 01/24/2023.   MRI abdo w wo gadavist 1/24/23: Liver: Normal size. Homogeneous background signal. Stable hemangiomas measuring 0.9 cm (11:27), 0.5 cm (15:43), 0.8 cm (11:48), 0.7 cm (15:33). Additional T2 hyperintense/diffusion hyperintense lesions which are too small to further characterize, likely also hemangiomas or hepatic cysts (04:34). Hepatic and portal veins are patent. Impression: small hepatic hemangiomas  MRE 1/24/23: normal fat fraction; no iron overload; F0 fibrosis        Outpatient Encounter Medications as of 7/10/2024   Medication Sig Dispense Refill    ALPRAZolam (XANAX) 0.5 MG tablet Take 0.5 mg by mouth daily as needed.      dextroamphetamine-amphetamine (ADDERALL XR) 20 MG 24 hr capsule Take by mouth every morning.      doxepin (SINEQUAN) 10 MG capsule Take 10 mg by mouth.      omeprazole (PRILOSEC) 40 MG capsule Take 40 mg by mouth.      ondansetron (ZOFRAN-ODT) 4 MG TbDL Take 4 mg by mouth every 4 (four) hours as needed.      triamcinolone acetonide 0.1% (KENALOG) 0.1 % cream  SMARTSI Application Topical 2-3 Times Daily      valACYclovir (VALTREX) 500 MG tablet PRN  0    dextroamphetamine-amphetamine 10 mg Tab Take 1 tablet by mouth.      semaglutide (OZEMPIC SUBQ) Inject 20 Units into the skin every 7 days. (Patient not taking: Reported on 7/10/2024)      zolpidem (AMBIEN) 5 MG Tab Take 5 mg by mouth nightly as needed. (Patient not taking: Reported on 7/10/2024)       No facility-administered encounter medications on file as of 7/10/2024.     Review of patient's allergies indicates:   Allergen Reactions    Scopolamine Other (See Comments)     Blurred vision.     Past Medical History:   Diagnosis Date    Hemochromatosis        Review of Systems   Constitutional: Negative.    HENT: Negative.     Eyes: Negative.    Respiratory: Negative.     Cardiovascular: Negative.    Gastrointestinal: Negative.    Genitourinary: Negative.    Musculoskeletal: Negative.    Skin: Negative.    Neurological: Negative.    Psychiatric/Behavioral: Negative.         Vitals:    07/10/24 1033   BP: 104/62   Pulse: 91   Resp: 18           Physical Exam  Vitals reviewed.   Constitutional:       Appearance: She is well-developed.   HENT:      Head: Normocephalic and atraumatic.   Eyes:      General: No scleral icterus.     Conjunctiva/sclera: Conjunctivae normal.      Pupils: Pupils are equal, round, and reactive to light.   Neck:      Thyroid: No thyromegaly.   Cardiovascular:      Rate and Rhythm: Normal rate and regular rhythm.      Heart sounds: Normal heart sounds.   Pulmonary:      Effort: Pulmonary effort is normal.      Breath sounds: Normal breath sounds. No rales.   Abdominal:      General: Bowel sounds are normal. There is no distension.      Palpations: Abdomen is soft. There is no mass.      Tenderness: There is no abdominal tenderness.   Musculoskeletal:         General: Normal range of motion.      Cervical back: Normal range of motion and neck supple.   Skin:     General: Skin is warm and dry.       Findings: No rash.   Neurological:      Mental Status: She is alert and oriented to person, place, and time.         MELD 3.0: 7 at 6/26/2024 12:48 PM  MELD-Na: 6 at 6/26/2024 12:48 PM  Calculated from:  Serum Creatinine: 0.7 mg/dL (Using min of 1 mg/dL) at 6/26/2024 12:48 PM  Serum Sodium: 139 mmol/L (Using max of 137 mmol/L) at 6/26/2024 12:48 PM  Total Bilirubin: 0.7 mg/dL (Using min of 1 mg/dL) at 6/26/2024 12:48 PM  Serum Albumin: 4.2 g/dL (Using max of 3.5 g/dL) at 6/26/2024 12:48 PM  INR(ratio): 0.9 (Using min of 1) at 6/26/2024 12:48 PM  Age at listing (hypothetical): 43 years  Sex: Female at 6/26/2024 12:48 PM      Lab Results   Component Value Date    GLU 76 07/02/2024    GLU 84 06/26/2024    BUN 10 07/02/2024    BUN 9 06/26/2024    CREATININE 0.61 07/02/2024    CREATININE 0.7 06/26/2024    CALCIUM 9.3 07/02/2024    CALCIUM 9.5 06/26/2024     07/02/2024     06/26/2024    K 4.6 07/02/2024    K 4.4 06/26/2024     06/26/2024    PROT 7.1 06/26/2024    CO2 30 07/02/2024    CO2 27 06/26/2024    ANIONGAP 8 06/26/2024    WBC 4.47 06/26/2024    RBC 4.16 06/26/2024    HGB 14.2 06/26/2024    HCT 42.6 06/26/2024     (H) 06/26/2024    MCH 34.1 (H) 06/26/2024    MCHC 33.3 06/26/2024     Lab Results   Component Value Date    RDW 12.6 06/26/2024     06/26/2024    MPV 9.5 06/26/2024    GRAN 2.3 06/26/2024    GRAN 52.0 06/26/2024    LYMPH 1.6 06/26/2024    LYMPH 36.2 06/26/2024    MONO 0.3 06/26/2024    MONO 6.5 06/26/2024    EOSINOPHIL 4.7 06/26/2024    BASOPHIL 0.4 06/26/2024    EOS 0.2 06/26/2024    BASO 0.02 06/26/2024    APTT 33.9 03/22/2022    GROUPTRH O POS 08/02/2021    GROUPTRH O POS 12/15/2020    ALBUMIN 4.3 07/02/2024    ALBUMIN 4.2 06/26/2024    BILIDIR 0.3 06/26/2024    AST 20 07/02/2024    AST 22 06/26/2024    ALT 15 07/02/2024    ALT 20 06/26/2024    ALKPHOS 49 07/02/2024    ALKPHOS 53 (L) 06/26/2024    LABPROT 10.3 06/26/2024    INR 0.9 06/26/2024       Assessment and  Plan:  Danielle Nelson is a 43 y.o. female with hemochromatosis. MRE  uen year ago shows no significant iron or fibrosis. Liver enzymes are normal. Ferritin <50 and iron sat is <45%. Recommend continued phlebotomy 2-3 times per year. Will check labs every 3 months. Repeat MRE in one year (01/25)    The liver lesions noted c/w hepatic hemangiomas. They are very small. No further surveillance is needed     Return 1 year  A total of 35 minutes was spent reviewing the patient's chart, examining the patient, reviewing labs and imaging and coordinating care with the patient's care team.

## 2024-08-06 ENCOUNTER — OFFICE VISIT (OUTPATIENT)
Dept: HEMATOLOGY/ONCOLOGY | Facility: CLINIC | Age: 44
End: 2024-08-06
Payer: COMMERCIAL

## 2024-08-06 ENCOUNTER — LAB VISIT (OUTPATIENT)
Dept: LAB | Facility: HOSPITAL | Age: 44
End: 2024-08-06
Payer: COMMERCIAL

## 2024-08-06 VITALS
HEART RATE: 72 BPM | HEIGHT: 65 IN | BODY MASS INDEX: 22.34 KG/M2 | WEIGHT: 134.06 LBS | RESPIRATION RATE: 18 BRPM | TEMPERATURE: 98 F | OXYGEN SATURATION: 100 % | DIASTOLIC BLOOD PRESSURE: 72 MMHG | SYSTOLIC BLOOD PRESSURE: 110 MMHG

## 2024-08-06 DIAGNOSIS — D75.1 POLYCYTHEMIA: ICD-10-CM

## 2024-08-06 DIAGNOSIS — Q24.9 ADULT CONGENITAL HEART DISEASE: ICD-10-CM

## 2024-08-06 DIAGNOSIS — E83.110 HEREDITARY HEMOCHROMATOSIS: Primary | ICD-10-CM

## 2024-08-06 LAB
ALBUMIN SERPL BCP-MCNC: 3.9 G/DL (ref 3.5–5.2)
ALP SERPL-CCNC: 50 U/L (ref 55–135)
ALT SERPL W/O P-5'-P-CCNC: 16 U/L (ref 10–44)
ANION GAP SERPL CALC-SCNC: 5 MMOL/L (ref 8–16)
AST SERPL-CCNC: 19 U/L (ref 10–40)
BASOPHILS # BLD AUTO: 0.04 K/UL (ref 0–0.2)
BASOPHILS NFR BLD: 0.8 % (ref 0–1.9)
BILIRUB SERPL-MCNC: 0.4 MG/DL (ref 0.1–1)
BUN SERPL-MCNC: 11 MG/DL (ref 6–20)
CALCIUM SERPL-MCNC: 9.6 MG/DL (ref 8.7–10.5)
CHLORIDE SERPL-SCNC: 105 MMOL/L (ref 95–110)
CO2 SERPL-SCNC: 29 MMOL/L (ref 23–29)
CREAT SERPL-MCNC: 0.7 MG/DL (ref 0.5–1.4)
DIFFERENTIAL METHOD BLD: ABNORMAL
EOSINOPHIL # BLD AUTO: 0.1 K/UL (ref 0–0.5)
EOSINOPHIL NFR BLD: 2.5 % (ref 0–8)
ERYTHROCYTE [DISTWIDTH] IN BLOOD BY AUTOMATED COUNT: 12.1 % (ref 11.5–14.5)
EST. GFR  (NO RACE VARIABLE): >60 ML/MIN/1.73 M^2
FERRITIN SERPL-MCNC: 31 NG/ML (ref 20–300)
GLUCOSE SERPL-MCNC: 70 MG/DL (ref 70–110)
HCT VFR BLD AUTO: 41 % (ref 37–48.5)
HGB BLD-MCNC: 13.4 G/DL (ref 12–16)
IMM GRANULOCYTES # BLD AUTO: 0.01 K/UL (ref 0–0.04)
IMM GRANULOCYTES NFR BLD AUTO: 0.2 % (ref 0–0.5)
IRON SERPL-MCNC: 102 UG/DL (ref 30–160)
LYMPHOCYTES # BLD AUTO: 2.1 K/UL (ref 1–4.8)
LYMPHOCYTES NFR BLD: 41.1 % (ref 18–48)
MCH RBC QN AUTO: 34.8 PG (ref 27–31)
MCHC RBC AUTO-ENTMCNC: 32.7 G/DL (ref 32–36)
MCV RBC AUTO: 107 FL (ref 82–98)
MONOCYTES # BLD AUTO: 0.5 K/UL (ref 0.3–1)
MONOCYTES NFR BLD: 9.6 % (ref 4–15)
NEUTROPHILS # BLD AUTO: 2.4 K/UL (ref 1.8–7.7)
NEUTROPHILS NFR BLD: 45.8 % (ref 38–73)
NRBC BLD-RTO: 0 /100 WBC
PLATELET # BLD AUTO: 222 K/UL (ref 150–450)
PMV BLD AUTO: 10.2 FL (ref 9.2–12.9)
POTASSIUM SERPL-SCNC: 4 MMOL/L (ref 3.5–5.1)
PROT SERPL-MCNC: 6.5 G/DL (ref 6–8.4)
RBC # BLD AUTO: 3.85 M/UL (ref 4–5.4)
SATURATED IRON: 35 % (ref 20–50)
SODIUM SERPL-SCNC: 139 MMOL/L (ref 136–145)
TOTAL IRON BINDING CAPACITY: 289 UG/DL (ref 250–450)
TRANSFERRIN SERPL-MCNC: 195 MG/DL (ref 200–375)
WBC # BLD AUTO: 5.21 K/UL (ref 3.9–12.7)

## 2024-08-06 PROCEDURE — 99215 OFFICE O/P EST HI 40 MIN: CPT | Mod: S$GLB,,, | Performed by: INTERNAL MEDICINE

## 2024-08-06 PROCEDURE — 84466 ASSAY OF TRANSFERRIN: CPT | Performed by: INTERNAL MEDICINE

## 2024-08-06 PROCEDURE — 1159F MED LIST DOCD IN RCRD: CPT | Mod: CPTII,S$GLB,, | Performed by: INTERNAL MEDICINE

## 2024-08-06 PROCEDURE — 99999 PR PBB SHADOW E&M-EST. PATIENT-LVL III: CPT | Mod: PBBFAC,,, | Performed by: INTERNAL MEDICINE

## 2024-08-06 PROCEDURE — 83540 ASSAY OF IRON: CPT | Performed by: INTERNAL MEDICINE

## 2024-08-06 PROCEDURE — 3008F BODY MASS INDEX DOCD: CPT | Mod: CPTII,S$GLB,, | Performed by: INTERNAL MEDICINE

## 2024-08-06 PROCEDURE — 3074F SYST BP LT 130 MM HG: CPT | Mod: CPTII,S$GLB,, | Performed by: INTERNAL MEDICINE

## 2024-08-06 PROCEDURE — 80053 COMPREHEN METABOLIC PANEL: CPT | Performed by: INTERNAL MEDICINE

## 2024-08-06 PROCEDURE — 36415 COLL VENOUS BLD VENIPUNCTURE: CPT | Performed by: INTERNAL MEDICINE

## 2024-08-06 PROCEDURE — 3078F DIAST BP <80 MM HG: CPT | Mod: CPTII,S$GLB,, | Performed by: INTERNAL MEDICINE

## 2024-08-06 PROCEDURE — 85025 COMPLETE CBC W/AUTO DIFF WBC: CPT | Performed by: INTERNAL MEDICINE

## 2024-08-06 PROCEDURE — 82728 ASSAY OF FERRITIN: CPT | Performed by: INTERNAL MEDICINE
